# Patient Record
Sex: FEMALE | Race: WHITE | Employment: UNEMPLOYED | URBAN - METROPOLITAN AREA
[De-identification: names, ages, dates, MRNs, and addresses within clinical notes are randomized per-mention and may not be internally consistent; named-entity substitution may affect disease eponyms.]

---

## 2017-09-26 ENCOUNTER — HOSPITAL ENCOUNTER (OUTPATIENT)
Dept: MAMMOGRAPHY | Age: 67
Discharge: HOME | End: 2017-09-26
Attending: INTERNAL MEDICINE
Payer: COMMERCIAL

## 2017-09-26 DIAGNOSIS — Z12.31 VISIT FOR SCREENING MAMMOGRAM: ICD-10-CM

## 2017-09-26 PROCEDURE — 77067 SCR MAMMO BI INCL CAD: CPT

## 2018-04-30 ENCOUNTER — ED HISTORICAL/CONVERTED ENCOUNTER (OUTPATIENT)
Dept: OTHER | Age: 68
End: 2018-04-30

## 2018-07-11 ENCOUNTER — OFFICE VISIT (OUTPATIENT)
Dept: NEUROLOGY | Age: 68
End: 2018-07-11

## 2018-07-11 VITALS — DIASTOLIC BLOOD PRESSURE: 84 MMHG | HEART RATE: 71 BPM | OXYGEN SATURATION: 93 % | SYSTOLIC BLOOD PRESSURE: 148 MMHG

## 2018-07-11 DIAGNOSIS — R20.2 PARESTHESIA: Primary | ICD-10-CM

## 2018-07-11 DIAGNOSIS — G57.83 OTHER SPECIFIED MONONEUROPATHIES OF BILATERAL LOWER LIMBS: ICD-10-CM

## 2018-07-11 RX ORDER — GUAIFENESIN 100 MG/5ML
81 LIQUID (ML) ORAL DAILY
COMMUNITY
End: 2020-06-12

## 2018-07-11 RX ORDER — POLYETHYLENE GLYCOL 3350 17 G/17G
17 POWDER, FOR SOLUTION ORAL DAILY
COMMUNITY
End: 2020-06-12

## 2018-07-11 RX ORDER — PRAVASTATIN SODIUM 40 MG/1
40 TABLET ORAL DAILY
Refills: 0 | COMMUNITY
Start: 2018-07-02

## 2018-07-11 NOTE — LETTER
7/11/2018 11:56 AM 
 
Patient:  Chinyere Hansen YOB: 1950 Date of Visit: 7/11/2018 Dear Harini Linares MD 
[address] [address] VIA Facsimile: [phone] 
 : 
 
 
Thank you for referring Ms. Heri Hernandez to me for evaluation/treatment. Below are the relevant portions of my assessment and plan of care. If you have questions, please do not hesitate to call me. I look forward to following Ms. Gage along with you. Sincerely, Hoda Hernandez MD

## 2018-07-11 NOTE — MR AVS SNAPSHOT
[address] [place] [address] [id number] [place] [address] 
[phone] Patient: Landry Petit MRN: [id number] BYM:[birth year] Visit Information Date & Time Provider Department Dept. Phone Encounter #  
 7/11/2018 11:00 AM Todd Shields, [address] [facility] [phone] [id number] Follow-up Instructions Return for review of results. Upcoming Health Maintenance Date Due DTaP/Tdap/Td series (1 - Tdap) 11/28/1971 FOBT Q 1 YEAR AGE 50-75 11/28/2000 ZOSTER VACCINE AGE 60> 9/28/2010 GLAUCOMA SCREENING Q2Y 11/28/2015 Bone Densitometry (Dexa) Screening 11/28/2015 Pneumococcal 65+ Low/Medium Risk (1 of 2 - PCV13) 11/28/2015 Influenza Age 5 to Adult 8/1/2018 BREAST CANCER SCRN MAMMOGRAM 9/26/2019 Allergies as of 7/11/2018  Review Complete On: 7/11/2018 By: Todd Shields MD  
  
 Severity Noted Reaction Type Reactions Sulfa (Sulfonamide Antibiotics)  10/22/2010    Rash Current Immunizations  Never Reviewed No immunizations on file. Not reviewed this visit You Were Diagnosed With   
  
 Codes Comments Paresthesia    -  Primary ICD-10-CM: R20.2 ICD-9-CM: 782.0 Other specified mononeuropathies of bilateral lower limbs     ICD-10-CM: G57.83 ICD-9-CM: 355.8 Vitals BP Pulse SpO2 OB Status Smoking Status 148/84 71 93% Postmenopausal Former Smoker Preferred Pharmacy Pharmacy Name Phone [address] [place], [place] - [address] -[phone] Your Updated Medication List  
  
   
This list is accurate as of 7/11/18 11:49 AM.  Always use your most recent med list.  
  
  
  
  
 Levy Raines 250-50 mcg/dose diskus inhaler Generic drug:  fluticasone-salmeterol Take 1 Puff by inhalation every twelve (12) hours. ALPRAZolam 0.5 mg tablet Commonly known as:  Matilde Pittman Take 0.5 mg by mouth three (3) times daily as needed. TERE CHEWABLE ASPIRIN 81 mg chewable tablet Generic drug:  aspirin Take 81 mg by mouth daily. calcium-vitamin D 500 mg(1,250mg) -200 unit per tablet Commonly known as:  OYSTER SHELL Take 1 Tab by mouth two (2) times daily (with meals). citalopram 20 mg tablet Commonly known as:  Wanetta Never Take 20 mg by mouth daily. COMBIVENT IN Take 14.7 g by inhalation four (4) times daily. dilTIAZem  mg ER capsule Commonly known as:  CARDIZEM CD Take  by mouth daily. estradiol 0.01 % (0.1 mg/gram) vaginal cream  
Commonly known as:  ESTRACE  
1 g PV 2 times a week  
  
 levothyroxine 50 mcg tablet Commonly known as:  SYNTHROID Take  by mouth Daily (before breakfast). montelukast 10 mg tablet Commonly known as:  SINGULAIR Take 10 mg by mouth daily. nortriptyline 10 mg capsule Commonly known as:  PAMELOR Take  by mouth nightly. polyethylene glycol 17 gram packet Commonly known as:  Dena Kehr Take 17 g by mouth daily. pravastatin 20 mg tablet Commonly known as:  PRAVACHOL Take 20 mg by mouth daily. TYLENOL EXTRA STRENGTH 500 mg tablet Generic drug:  acetaminophen Take 500 mg by mouth every six (6) hours as needed. We Performed the Following JOSHUA, DIRECT, W/REFLEX M4630064 CPT(R)] SED RATE (ESR) I5788654 CPT(R)] SPEP AND SNEHAL, SERUM [OMW36135 Custom] VITAMIN B12 & FOLATE [72571 CPT(R)] VITAMIN B6 G2410397 CPT(R)] Follow-up Instructions Return for review of results. To-Do List   
 07/11/2018 Neurology:  EMG LIMITED   
  
 07/11/2018 Imaging:  MRI BRAIN WO CONT Introducing \[facility] [place] [facility]\"" & HEALTH SERVICES! Myles Wright introduces [employer] patient portal. Now you can access parts of your medical record, email your doctor's office, and request medication refills online. 1. In your internet browser, go to [email or web address] 2. Click on the First Time User? Click Here link in the Sign In box. You will see the New Member Sign Up page. 3. Enter your [employer] Access Code exactly as it appears below. You will not need to use this code after youve completed the sign-up process. If you do not sign up before the expiration date, you must request a new code. · [employer] Access Code: [email or web address] Expires: 10/9/2018  9:50 AM 
 
4. Enter the last four digits of your Social Security Number (xxxx) and Date of Birth (mm/dd/yyyy) as indicated and click Submit. You will be taken to the next sign-up page. 5. Create a MindSnacks ID. This will be your MindSnacks login ID and cannot be changed, so think of one that is secure and easy to remember. 6. Create a MindSnacks password. You can change your password at any time. 7. Enter your Password Reset Question and Answer. This can be used at a later time if you forget your password. 8. Enter your e-mail address. You will receive e-mail notification when new information is available in [address] 9. Click Sign Up. You can now view and download portions of your medical record. 10. Click the Download Summary menu link to download a portable copy of your medical information. If you have questions, please visit the Frequently Asked Questions section of the Mind[employer] website. Remember, Mind[employer]acks is NOT to be used for urgent needs. For medical emergencies, dial 911. Now available from your iPhone and Android! Please provide this summary of care documentation to your next provider. Your primary care clinician is listed as Clinton Bucio. If you have any questions after today's visit, please call [phone].

## 2018-07-11 NOTE — PROGRESS NOTES
NEUROLOGY NEW PATIENT OFFICE CONSULTATION      7/11/2018    RE: Jacklyn De La Vega         1950      REFERRED BY:  Latrell Bullock MD        CHIEF COMPLAINT:  This is Jacklyn De La Vega is a 79 y.o. female right handed on disability due to COPD who had concerns including Numbness. HPI:     1 month ago, patient noted L LE numbness tingling, started on tips of toes and went up to L buttock and all toes of R foot. Patient also has intermittent numbness of whole fingers of L arm and radiates to the L neck. Also has intermittent numbness of tips of all fingers of R hand. Had brief episode of numbness of L jaw.     (-) weakness  (+) L buttock pain  (-) lower back pain   (+) midline neck pain , non-radiating  (-) falls  (-) incontinene  (-) headache    Currently, symptoms remain the same    ROS   (-) rash  (-) fever  All other systems reviewed and are negative    Past Medical Hx  Past Medical History:   Diagnosis Date    Arrhythmia     svt    Calculus of kidney     COPD     emphysema    GERD (gastroesophageal reflux disease)     Psychiatric disorder     depression    Psychiatric disorder     anxiety    Thyroid disease    L leg fracture  - 1999    Social Hx  Social History     Social History    Marital status: LEGALLY      Spouse name: N/A    Number of children: N/A    Years of education: N/A     Social History Main Topics    Smoking status: Former Smoker    Smokeless tobacco: Former User     Quit date: 1/1/2003    Alcohol use No    Drug use: No    Sexual activity: Not on file     Other Topics Concern    Not on file     Social History Narrative       Family Hx  Family History   Problem Relation Age of Onset    Malignant Hyperthermia Neg Hx     Pseudocholinesterase Deficiency Neg Hx     Delayed Awakening Neg Hx     Post-op Nausea/Vomiting Neg Hx     Emergence Delirium Neg Hx     Post-op Cognitive Dysfunction Neg Hx     Other Neg Hx        ALLERGIES  Allergies   Allergen Reactions    Sulfa (Sulfonamide Antibiotics) Rash       CURRENT MEDS  Current Outpatient Prescriptions   Medication Sig Dispense Refill    pravastatin (PRAVACHOL) 20 mg tablet Take 20 mg by mouth daily. 0    aspirin (TERE CHEWABLE ASPIRIN) 81 mg chewable tablet Take 81 mg by mouth daily.  polyethylene glycol (MIRALAX) 17 gram packet Take 17 g by mouth daily.  acetaminophen (TYLENOL EXTRA STRENGTH) 500 mg tablet Take 500 mg by mouth every six (6) hours as needed.  levothyroxine (SYNTHROID) 50 mcg tablet Take  by mouth Daily (before breakfast).  diltiazem CD (CARDIZEM CD) 240 mg ER capsule Take  by mouth daily.  citalopram (CELEXA) 20 mg tablet Take 20 mg by mouth daily.  fluticasone-salmeterol (ADVAIR DISKUS) 250-50 mcg/Dose diskus inhaler Take 1 Puff by inhalation every twelve (12) hours.  IPRATROPIUM/ALBUTEROL SULFATE (COMBIVENT IN) Take 14.7 g by inhalation four (4) times daily.  alprazolam (XANAX) 0.5 mg tablet Take 0.5 mg by mouth three (3) times daily as needed.  nortriptyline (PAMELOR) 10 mg capsule Take  by mouth nightly.  montelukast (SINGULAIR) 10 mg tablet Take 10 mg by mouth daily.  estradiol (ESTRACE) 0.01 % (0.1 mg/gram) vaginal cream 1 g PV 2 times a week 42.5 g 0    calcium-vitamin D (OYSTER SHELL) 500 mg(1,250mg) -200 unit per tablet Take 1 Tab by mouth two (2) times daily (with meals). PREVIOUS WORKUP: (reviewed)  IMAGING:    CT Results (recent):  No results found for this or any previous visit. MRI Results (recent):  No results found for this or any previous visit. IR Results (recent):  No results found for this or any previous visit. VAS/US Results (recent):  No results found for this or any previous visit.         LABS (reviewed)  Results for orders placed or performed in visit on 11/13/14   CULTURE, URINE   Result Value Ref Range    Urine Culture, Routine No growth    HIV 1/2 AB, BY IMMUNOBLOT   Result Value Ref Range    HIV 1/O/2 Abs <1.00 <1.00    HIV 1/O/2 Abs, QL Non Reactive Non Reactive   RPR   Result Value Ref Range    RPR Non Reactive Non Reactive   HEP B SURFACE AG   Result Value Ref Range    Hep B surface Ag screen Negative Negative   HEPATITIS C AB   Result Value Ref Range    HEP C VIRUS AB <0.1 0.0 - 0.9 s/co ratio   CT, NG, TRICH VAG BY KOKO   Result Value Ref Range    C. trachomatis by KOKO Negative Negative    N. gonorrhoeae by KOKO Negative Negative    T. vaginalis by KOKO Negative Negative   PAP IG, HPV AND RFX HPV 50/40,40([id number])   Result Value Ref Range    Diagnosis Comment     Specimen adequacy Comment     CICD-9 Comment     Performed by: Comment     QC reviewed by: Comment     . Roverto Urrutia Note: Comment     Test methodology Comment     HPV DNA Probe, High Risk Negative Negative       Physical Exam:     Visit Vitals    /84    Pulse 71    SpO2 93%     General:  Alert, cooperative, no distress. Head:  Normocephalic, without obvious abnormality, atraumatic. Eyes:  Conjunctivae/corneas clear. Lungs:  Heart:   Non labored breathing  Regular rate and rhythm, no carotid bruits   Abdomen:   Soft, non-distended   Extremities: Extremities normal, atraumatic, no cyanosis or edema. Pulses: 2+ and symmetric all extremities. Skin: Skin color, texture, turgor normal. No rashes or lesions.   Neurologic Exam     Gen: Attention normal             Language: naming, repetition, fluency normal             Memory: intact recent and remote memory  Cranial Nerves:  I: smell Not tested   II: visual fields Full to confrontation   II: pupils Equal, round, reactive to light   II: optic disc No papilledema   III,VII: ptosis none   III,IV,VI: extraocular muscles  Full ROM   V: mastication normal   V: facial light touch sensation  normal   VII: facial muscle function   symmetric   VIII: hearing symmetric   IX: soft palate elevation  normal   XI: trapezius strength  5/5   XI: sternocleidomastoid strength 5/5   XI: neck flexion strength 5/5   XII: tongue  midline     Motor: normal bulk and tone, no tremor              Strength: 5/5 all four extremities  Sensory: intact to LT, PP, vibration, and JPS  Reflexes: 2+ UE 3+ knees, trace R ankle, absent L ankle; Down going toes  Coordination: Good FTN and HTS  Gait: normal gait including tandem; able to walk on toes and heels           Impression:     Bailee Vera is a 79 y.o. female who  has a past medical history of Arrhythmia; Calculus of kidney; COPD on O2; GERD (gastroesophageal reflux disease); Psychiatric disorder; Psychiatric disorder; and Thyroid disease. who   1 month ago, patient noted sudden onset of L LE numbness tingling, started on tips of toes and went up to L buttock and all toes of R foot. Patient also has intermittent numbness of whole fingers of L arm and radiates to the L neck. Also has intermittent numbness of tips of all fingers of R hand. Had brief episode of numbness of L jaw. (+) L buttock pain. Considerations include stroke/ demyelination, acute polyneuropathy/ radiculopathy and metabolic/nutritional/inflammatory causes. Non-organic component or paresthesias related to COPD are possibilities. RECOMMENDATIONS  1. MRI brain looking for structural cause of her symptoms (has numbness in the L face)  2. EMG/NCS looking for evidence of GBS and radiculopathy (absent L ankle reflex)  3. Blood test for Vit B12, Vit B6, Folate, SPEP/SNEHAL, JOSHUA, ESR  4. Optimize medical management of hypothyroid and anxiety c/o PCP    Follow-up Disposition:  Return for review of results.       Thank you for the consultation      Jania Franklin MD  Diplomate, American Board of Psychiatry and Neurology  Diplomate, Neuromuscular Medicine  Diplomate, American Board of Electrodiagnostic Medicine        CC: Cuca Sampson MD  Fax: [phone]

## 2018-07-13 LAB
FOLATE SERPL-MCNC: 12.3 NG/ML
VIT B12 SERPL-MCNC: 287 PG/ML (ref 232–1245)

## 2018-07-17 ENCOUNTER — TELEPHONE (OUTPATIENT)
Dept: NEUROLOGY | Age: 68
End: 2018-07-17

## 2018-07-17 NOTE — TELEPHONE ENCOUNTER
TC- notified patient she needs to take Vit B12 to help with her symptoms per Dr. Mor Barger. Patient verbalized understanding. Office visit notes and Dr. Mor Barger recommendation was faxed to Dr. Lavell Favre office.

## 2018-07-19 LAB
ALBUMIN SERPL ELPH-MCNC: 4 G/DL (ref 2.9–4.4)
ALBUMIN/GLOB SERPL: 1.2 {RATIO} (ref 0.7–1.7)
ALPHA1 GLOB SERPL ELPH-MCNC: 0.3 G/DL (ref 0–0.4)
ALPHA2 GLOB SERPL ELPH-MCNC: 0.9 G/DL (ref 0.4–1)
ANA SER QL: POSITIVE
B-GLOBULIN SERPL ELPH-MCNC: 1.2 G/DL (ref 0.7–1.3)
CENTROMERE B AB SER-ACNC: <0.2 AI (ref 0–0.9)
CHROMATIN AB SERPL-ACNC: <0.2 AI (ref 0–0.9)
DSDNA AB SER-ACNC: <1 IU/ML (ref 0–9)
ENA JO1 AB SER-ACNC: <0.2 AI (ref 0–0.9)
ENA RNP AB SER-ACNC: <0.2 AI (ref 0–0.9)
ENA SCL70 AB SER-ACNC: <0.2 AI (ref 0–0.9)
ENA SM AB SER-ACNC: <0.2 AI (ref 0–0.9)
ENA SS-A AB SER-ACNC: 7.1 AI (ref 0–0.9)
ENA SS-B AB SER-ACNC: >8 AI (ref 0–0.9)
ERYTHROCYTE [SEDIMENTATION RATE] IN BLOOD BY WESTERGREN METHOD: 4 MM/HR (ref 0–40)
GAMMA GLOB SERPL ELPH-MCNC: 1.1 G/DL (ref 0.4–1.8)
GLOBULIN SER-MCNC: 3.4 G/DL (ref 2.2–3.9)
IGA SERPL-MCNC: 176 MG/DL (ref 87–352)
IGG SERPL-MCNC: 1039 MG/DL (ref 700–1600)
IGM SERPL-MCNC: 116 MG/DL (ref 26–217)
INTERPRETATION SERPL IEP-IMP: NORMAL
M PROTEIN SERPL ELPH-MCNC: NORMAL G/DL
PLEASE NOTE:, 149534: NORMAL
PROT SERPL-MCNC: 7.4 G/DL (ref 6–8.5)
SEE BELOW, 164869: ABNORMAL
VIT B6 SERPL-MCNC: 5.6 UG/L (ref 2–32.8)

## 2018-07-23 ENCOUNTER — HOSPITAL ENCOUNTER (OUTPATIENT)
Dept: MRI IMAGING | Age: 68
Discharge: HOME | End: 2018-07-23
Attending: PSYCHIATRY & NEUROLOGY
Payer: MEDICARE

## 2018-07-23 ENCOUNTER — TELEPHONE (OUTPATIENT)
Dept: NEUROLOGY | Age: 68
End: 2018-07-23

## 2018-07-23 DIAGNOSIS — R20.2 PARESTHESIA: ICD-10-CM

## 2018-07-23 PROCEDURE — 70551 MRI BRAIN STEM W/O DYE: CPT

## 2018-07-26 ENCOUNTER — TELEPHONE (OUTPATIENT)
Dept: NEUROLOGY | Age: 68
End: 2018-07-26

## 2018-07-26 ENCOUNTER — OFFICE VISIT (OUTPATIENT)
Dept: NEUROLOGY | Age: 68
End: 2018-07-26

## 2018-07-26 DIAGNOSIS — R20.2 PARESTHESIA: Primary | ICD-10-CM

## 2018-07-26 NOTE — PROGRESS NOTES
EMG/NCS done. See Procedure Note for results. Since the last time, patient started taking Vit B12 for 1 week with minimal improvement    Discussed EMG/NCS showing absent sensory responses in the lower extremity with absent H-reflex concerning for an underlying generalized sensory polyneuropathy. Results for orders placed or performed in visit on 07/11/18   VITAMIN B12 & FOLATE   Result Value Ref Range    Vitamin B12 287 232 - 1245 pg/mL    Folate 12.3 >3.0 ng/mL   SED RATE (ESR)   Result Value Ref Range    Sed rate (ESR) 4 0 - 40 mm/hr   JOSHUA, DIRECT, W/REFLEX   Result Value Ref Range    Antinuclear Antibodies Direct Positive (A) Negative    Anti-DNA (DS) Ab, QT <1 0 - 9 IU/mL    RNP Abs <0.2 0.0 - 0.9 AI    Cox Abs <0.2 0.0 - 0.9 AI    Scleroderma-70 Ab <0.2 0.0 - 0.9 AI    Sjogren's Anti-SS-A 7.1 (H) 0.0 - 0.9 AI    Sjogren's Anti-SS-B >8.0 (H) 0.0 - 0.9 AI    Antichromatin Ab <0.2 0.0 - 0.9 AI    Anti-Chyna-1 <0.2 0.0 - 0.9 AI    Centromere B Ab <0.2 0.0 - 0.9 AI    See below Comment    VITAMIN B6   Result Value Ref Range    Vitamin B6 5.6 2.0 - 32.8 ug/L   SPEP AND SNEHAL, SERUM   Result Value Ref Range    Immunoglobulin G, Qt. 1039 700 - 1600 mg/dL    Immunoglobulin A, Qt. 176 87 - 352 mg/dL    Immunoglobulin M, Qt. 116 26 - 217 mg/dL    Protein, total 7.4 6.0 - 8.5 g/dL    Albumin 4.0 2.9 - 4.4 g/dL    Alpha-1-Globulin 0.3 0.0 - 0.4 g/dL    Alpha-2-Globulin 0.9 0.4 - 1.0 g/dL    Beta Globulin 1.2 0.7 - 1.3 g/dL    Gamma Globulin 1.1 0.4 - 1.8 g/dL    M-Tom Not Observed Not Observed g/dL    Globulin, total 3.4 2.2 - 3.9 g/dL    A/G ratio 1.2 0.7 - 1.7    Immunofixation Result Comment     Please note Comment      A> Generalized sensory polyneuropathy  (+) JOSHUA (+) SSA (+) SSB - possible sjogren syndrome    P> 1) Will refer to rheumatology for further evaluation  2) Discussed lab results.  MRI brain was unremarkable  3) Continue Vit B12 1000 mcg every day     Follow-up Disposition:  Return for after seen by rheumatologist.      Malou Ortega MD

## 2018-07-26 NOTE — PROCEDURES
EMG/ NCS Report  [facility]  - [facility]  [facility]  Chu Miramontes, [address] [place], [facility]   Ph: [phone]   FAX: [phone]/ [phone]  Test Date:  2018    Patient: Ellie Vargas : 1950 Physician: Terrie Patterson MD   Sex: Female Height: ' \" Ref Phys: Eliot Macdonald MD   ID#: [id number] Weight:  lbs. Technician: Aundrea Olivarez     Patient History / Exam:  Patient is coming for polyneuropathy evaluation. Fariha Schaffer is a 79 y.o. female who  has a past medical history of Arrhythmia; Calculus of kidney; COPD on O2; GERD (gastroesophageal reflux disease); Psychiatric disorder; and Thyroid disease. who 1 month ago, patient noted sudden onset of L LE numbness tingling, started on tips of toes and went up to L buttock and all toes of R foot. Patient also has intermittent numbness of whole fingers of L arm and radiates to the L neck. Also has intermittent numbness of tips of all fingers of R hand. Had brief episode of numbness of L jaw. (+) L buttock pain. (+) JOSHUA (+) SSA (+) SSB    Exam: Patient awake, alert, follows commands, clear speech; hearing grossly intact; EOMI, (-) facial asymmetry, tongue midline; Motor: 5/5 in all extremities; Sensory: intact to LT, PP, JPS; DTRs ++; Good FTN and HTS; Gait: stable  Sensory: intact to LT, PP, vibration, and JPS  Reflexes: 2+ UE 3+ knees, trace R ankle, absent L ankle; Down going toes  Coordination: Good FTN and HTS  Gait: normal gait including tandem; able to walk on toes and heels        EMG & NCV Findings:  Evaluation of the left Fibular motor nerve showed normal distal onset latency (4.9 ms), normal amplitude (2.4 mV), normal conduction velocity (B Fib-Ankle, 50 m/s), and normal conduction velocity (Poplt-B Fib, 67 m/s). The left median motor nerve showed normal distal onset latency (3.0 ms), normal amplitude (13.1 mV), and normal conduction velocity (Elbow-Wrist, 50 m/s).   The left tibial motor nerve showed normal distal onset latency (3.8 ms), normal amplitude (16.5 mV), and normal conduction velocity (Knee-Ankle, 45 m/s). The left ulnar motor nerve showed normal distal onset latency (2.7 ms), normal amplitude (11.4 mV), normal conduction velocity (B Elbow-Wrist, 59 m/s), and normal conduction velocity (A Elbow-B Elbow, 62 m/s). The left median sensory, the left radial sensory, and the left ulnar sensory nerves showed normal distal peak latency (L3.3, L2.3, L3.4 ms) and normal amplitude (L29.2, L46.6, L32.7 µV). The left Sup Fibular sensory nerve showed no response (Lower leg). The left sural sensory nerve showed no response (Calf). All F Wave latencies were within normal limits. H-reflex studies indicate that the left tibial H-reflex has no response. All examined muscles (as indicated in the following table) showed no evidence of electrical instability. Impression:  ABNORMAL    Extensive electrodiagnostic examination of the left upper and left lower extremities shows absent sensory responses in the lower extremity with absent H-reflex concerning for an underlying generalized sensory polyneuropathy. There is no definite evidence of a cervical motor radiculopathy or lumbosacral motor radiculopathy on the left.         Padmini Thompson MD  Diplomate, American Board of Psychiatry and Neurology  Diplomate, Neuromuscular Medicine  Diplomate, American Board of Electrodiagnostic Medicine  Director, [address] [place] [address] Accredited Laboratory with Exemplary Status        Nerve Conduction Studies  Anti Sensory Summary Table     Stim Site NR Peak (ms) Norm Peak (ms) P-T Amp (µV) Norm P-T Amp Site1 Site2 Dist (cm)   Left Median Anti Sensory (2nd Digit)  31°C   Wrist    3.3 <4 29.2 >13 Wrist 2nd Digit 14.0   Left Radial Anti Sensory (Base 1st Digit)  29.3°C   Wrist    2.3 <2.8 46.6 >11 Wrist Base 1st Digit 10.0   Left Sup Fibular Anti Sensory (Lat ankle)  28.9°C   Lower leg NR  <4.6  >4 Lower leg Lat ankle 10.0   Left Sural Anti Sensory (Lat Mall)  29°C   Calf NR  <4.5  >4.0 Calf Lat Mall 14.0   Left Ulnar Anti Sensory (5th Digit)  30.5°C   Wrist    3.4 <4.0 32.7 >9 Wrist 5th Digit 14.0     Motor Summary Table     Stim Site NR Onset (ms) Norm Onset (ms) O-P Amp (mV) Norm O-P Amp Amp (Prev) (%) Site1 Site2 Dist (cm) Kedar (m/s) Norm Kedar (m/s)   Left Fibular Motor (Ext Dig Brev)  32.6°C   Ankle    4.9 <6.5 2.4 >1.1 100.0 Ankle Ext Dig Brev 8.0     B Fib    10.5  2.7  112.5 B Fib Ankle 28.0 50 >38   Poplt    12.0  2.6  96.3 Poplt B Fib 10.0 67 >42   Left Median Motor (Abd Poll Brev)  30°C   Wrist    3.0 <4.5 13.1 >4.1 100.0 Wrist Abd Poll Brev 8.0     Elbow    6.6  11.8  90.1 Elbow Wrist 18.0 50 >49   Left Tibial Motor (Abd Alonzo Brev)  30.6°C   Ankle    3.8 <6.1 16.5 >1.1 100.0 Ankle Abd Alonzo Brev 8.0     Knee    10.9  11.5  69.7 Knee Ankle 32.0 45 >39   Left Ulnar Motor (Abd Dig Minimi)  30°C   Wrist    2.7 <3.1 11.4 >7.0 100.0 Wrist Abd Dig Minimi 8.0  >50   B Elbow    6.1  8.4  73.7 B Elbow Wrist 20.0 59 >50   A Elbow    7.7  7.4  88.1 A Elbow B Elbow 10.0 62 >50     F Wave Studies     NR F-Lat (ms) Lat Norm (ms) L-R F-Lat (ms) L-R Lat Norm   Left Tibial (Mrkrs) (Abd Hallucis)  30°C      50.61 <56  <5.7   Left Ulnar (Mrkrs) (Abd Dig Min)  29.8°C      26.01 <32  <2.5     H Reflex Studies     NR H-Lat (ms) L-R H-Lat (ms) L-R Lat Norm   Left Tibial (Gastroc)  29.6°C   NR   <2.0     EMG     Side Muscle Nerve Root Ins Act Fibs Psw Recrt Duration Amp Poly Comment   Left Ext Dig Brev Dp Br Peron L5, S1 Nml Nml Nml Nml Nml Nml Nml    Left AbdHallucis MedPlantar S1-2 Nml Nml Nml Nml Nml Nml Nml    Left AntTibialis Dp Br Peron L4-5 Nml Nml Nml Nml Nml Nml Nml    Left MedGastroc Tibial S1-2 Nml Nml Nml Nml Nml Nml Nml    Left VastusLat Femoral L2-4 Nml Nml Nml Nml Nml Nml Nml    Left 1stDorInt Ulnar C8-T1 Nml Nml Nml Nml Nml Nml Nml    Left ExtIndicis Radial (Post Int) C7-8 Nml Nml Nml Nml Nml Nml Nml    Left Abd Poll Brev Median C8-T1 Nml Nml Nml Nml Nml Nml Nml    Left Biceps Musculocut C5-6 Nml Nml Nml Nml Nml Nml Nml    Left Triceps Radial C6-7-8 Nml Nml Nml Nml Nml Nml Nml    Left Deltoid Axillary C5-6 Nml Nml Nml Nml Nml Nml Nml    Left Lower Cerv Parasp Rami C7,T1 Nml Nml Nml Nml Nml Nml Nml    Left GluteusMed SupGluteal L4-S1 Nml Nml Nml Nml Nml Nml Nml    Left Lower Lumb Parasp Rami L5,S1 Nml Nml Nml Nml Nml Nml Nml                Nerve Conduction Studies  Anti Sensory Left/Right Comparison     Stim Site L Lat (ms) R Lat (ms) L-R Lat (ms) L Amp (µV) R Amp (µV) L-R Amp (%) Site1 Site2 L Kedar (m/s) R Kedar (m/s) L-R Kedar (m/s)   Median Anti Sensory (2nd Digit)  31°C   Wrist 2.6   29.2   Wrist 2nd Digit 54     Radial Anti Sensory (Base 1st Digit)  29.3°C   Wrist 1.6   46.6   Wrist Base 1st Digit 63     Sup Fibular Anti Sensory (Lat ankle)  28.9°C   Lower leg       Lower leg Lat ankle      Sural Anti Sensory (Lat Mall)  29°C   Calf       Calf Lat Mall      Ulnar Anti Sensory (5th Digit)  30.5°C   Wrist 2.5   32.7   Wrist 5th Digit 56       Motor Left/Right Comparison     Stim Site L Lat (ms) R Lat (ms) L-R Lat (ms) L Amp (mV) R Amp (mV) L-R Amp (%) Site1 Site2 L Kedar (m/s) R Kedar (m/s) L-R Kedar (m/s)   Fibular Motor (Ext Dig Brev)  32.6°C   Ankle 4.9   2.4   Ankle Ext Dig Brev      B Fib 10.5   2.7   B Fib Ankle 50     Poplt 12.0   2.6   Poplt B Fib 67     Median Motor (Abd Poll Brev)  30°C   Wrist 3.0   13.1   Wrist Abd Poll Brev      Elbow 6.6   11.8   Elbow Wrist 50     Tibial Motor (Abd Alonzo Brev)  30.6°C   Ankle 3.8   16.5   Ankle Abd Alonzo Brev      Knee 10.9   11.5   Knee Ankle 45     Ulnar Motor (Abd Dig Minimi)  30°C   Wrist 2.7   11.4   Wrist Abd Dig Minimi      B Elbow 6.1   8.4   B Elbow Wrist 59     A Elbow 7.7   7.4   A Elbow B Elbow 62           Waveforms:

## 2018-07-26 NOTE — TELEPHONE ENCOUNTER
TC- informed patient that JOSHUA is positive and she needs to follow up with PCP regarding this. Lab results faxed to Dr. Mell Watson.

## 2018-07-26 NOTE — LETTER
7/26/2018 1:56 PM 
 
Patient:  Eliezer Coleman YOB: 1950 Date of Visit: 7/26/2018 Dear Heather Godinez MD 
[address] [facility] [address] VIA Facsimile: [phone] 
 : 
 
 
Thank you for referring Ms. Delaney Petersen to me for evaluation/treatment. Below are the relevant portions of my assessment and plan of care. If you have questions, please do not hesitate to call me. I look forward to following Ms. Gage along with you. Sincerely, EMG SCHEDULE

## 2018-11-19 ENCOUNTER — OP HISTORICAL/CONVERTED ENCOUNTER (OUTPATIENT)
Dept: OTHER | Age: 68
End: 2018-11-19

## 2018-12-19 ENCOUNTER — ED HISTORICAL/CONVERTED ENCOUNTER (OUTPATIENT)
Dept: OTHER | Age: 68
End: 2018-12-19

## 2018-12-29 ENCOUNTER — IP HISTORICAL/CONVERTED ENCOUNTER (OUTPATIENT)
Dept: OTHER | Age: 68
End: 2018-12-29

## 2019-02-04 ENCOUNTER — ED HISTORICAL/CONVERTED ENCOUNTER (OUTPATIENT)
Dept: OTHER | Age: 69
End: 2019-02-04

## 2019-11-06 ENCOUNTER — OP HISTORICAL/CONVERTED ENCOUNTER (OUTPATIENT)
Dept: OTHER | Age: 69
End: 2019-11-06

## 2019-12-17 ENCOUNTER — OP HISTORICAL/CONVERTED ENCOUNTER (OUTPATIENT)
Dept: OTHER | Age: 69
End: 2019-12-17

## 2020-06-12 ENCOUNTER — OFFICE VISIT (OUTPATIENT)
Dept: ENDOCRINOLOGY | Age: 70
End: 2020-06-12

## 2020-06-12 VITALS
SYSTOLIC BLOOD PRESSURE: 135 MMHG | OXYGEN SATURATION: 95 % | TEMPERATURE: 98.6 F | DIASTOLIC BLOOD PRESSURE: 75 MMHG | WEIGHT: 127 LBS | HEIGHT: 63 IN | BODY MASS INDEX: 22.5 KG/M2 | RESPIRATION RATE: 18 BRPM | HEART RATE: 61 BPM

## 2020-06-12 DIAGNOSIS — E55.9 VITAMIN D DEFICIENCY: ICD-10-CM

## 2020-06-12 DIAGNOSIS — J44.9 CHRONIC OBSTRUCTIVE PULMONARY DISEASE, UNSPECIFIED COPD TYPE (HCC): ICD-10-CM

## 2020-06-12 DIAGNOSIS — M81.0 AGE-RELATED OSTEOPOROSIS WITHOUT CURRENT PATHOLOGICAL FRACTURE: ICD-10-CM

## 2020-06-12 DIAGNOSIS — M81.0 AGE-RELATED OSTEOPOROSIS WITHOUT CURRENT PATHOLOGICAL FRACTURE: Primary | ICD-10-CM

## 2020-06-12 DIAGNOSIS — K21.9 GERD WITHOUT ESOPHAGITIS: ICD-10-CM

## 2020-06-12 RX ORDER — HYDROXYZINE 25 MG/1
25 TABLET, FILM COATED ORAL
COMMUNITY

## 2020-06-12 RX ORDER — GABAPENTIN 600 MG/1
600 TABLET ORAL 3 TIMES DAILY
COMMUNITY

## 2020-06-12 RX ORDER — PANTOPRAZOLE SODIUM 40 MG/1
40 TABLET, DELAYED RELEASE ORAL DAILY
COMMUNITY

## 2020-06-12 NOTE — PATIENT INSTRUCTIONS
Andrew Degroot Daily requirement of calcium is 1000 mg - 1200 mg and Vitamin D is 800 - 1000 Units daily (should  preferably be from food sources). Calcium rich food choices are  low fat dairy products, fortified orange juice,broccoli,salmon. Fall precautions  Weight bearing exercises helps. Excess alcohol and smoking weakens the bone and hence should be avoided. Benefits of osteoporosis medications  Medicines for osteoporosis help prevent bone loss and increase bone density, which decrease the fracturesi. The table below outlines the common osteoporosis drugs, and how much they reduce the risk of fractures. Drug name How you take the medication Relative risk reduction for vertebral fracture  Relative risk reduction for hip fractures         Raloxifene (Evista)i Daily by mouth 36% No proven benefit    Alendronate  (Fosamax) Daily of weekly by mouth 45% 40%           Zolendronate (Reclast) Yearly IV infusion 70% 41%     Denosumabviiiviii  (Prolia) Twice yearly injection under the skin 68% 40%   Teriparatideixix (Forteo) Daily injection under the skin for 2 years  69% (this is in people with a prior vertebral fracture) No proven benefit   Abaloparatidexx (Tymlos) Daily injection under the skin for 1 and 1/2 years 86% No proven benefit   Romosozumab  ( Evenity) Monthly injection for 1 year  73%       Safety and side effects of osteoporosis medications    Common side effects of osteoporosis drugs   Acid reflux: this is only seen in oral osteoporosis drugs.  Flu-like symptoms: This is uncommon in oral osteoporosis drugs, but about 1 in 20 patients receiving the injectable osteoporosis drugs (reclast and prolia) can have flu-like symptoms for 1-3 days after the injection.  Low calcium (hypocalcemia): This is uncommon in oral osteoporosis drugs, but about 1-2 out of 100 patients receiving injectable osteoporosis drugs (reclast and prolia) can cause low calcium levels in the blood.      Rare side effects of osteoporosis drugs  Osteonecrosis of the jaw (ONJ):    0.01% over 10 years. In other words, one out of every 10,000 people to take this medication for 10 years could get ONJ.  ONJ is a rare condition where the bone of the lower or upper jaw becomes exposed (usually because of tooth extractions) and does not heal properly. This is a very rare side effect, and the risk is thought to be primarily for people on high doses of medication in the setting of cancer. Atypical femoral fractures (AFF):    0.5% over 10 years.  In other words 5 out of every 1000 people to take this medication for 10 years could have an atypical femoral fracture.  An atypical femur fracture is called atypical because of the location and condition of the fracture. AFFs start as a weakening of the outer rim of the femur below the hip area. The tiny crack that occurs is a kind of stress fracture, but unlike stress fractures in people who overdo exercise training, this fracture occurs with regular life activities. An AFF is also different from more common osteoporosis fractures that happen after a single injury - like a fall; AFFs develop slowly from repeated, normal activities. In about 2 of the 3 people who get an AFF, there are warning signals that occur over many weeks to months before the bone breaks. If nothing is done about the early warning signs, the crack continues to grow and eventually the thigh bone breaks in two. The warning sign of AFF is an aching pain in the groin or thigh. The risk of side effects from osteoporosis drugs is MUCH smaller than the potential benefit         If untreated, 2,000 out of every 10,000 people with a 20% risk would have an osteoporotic fracture.  If these same 10,000 people were treated with an osteoporosis medication:  o Only 10% of treated people (1,000 out of every 10,000) would have an osteoporotic fracture. In other words, 1000 fewer people would have an osteoporotic fracture. o 0.50% of the treated people (50 out of every 10,000) would have an atypical femoral fracture.  o 0.01% of the treated people (1 out of every 10,000) could have osteonecrosis of the jaw. While this is just an example, it clearly shows how unlikely it is someone will have a rare side effect, and how likely it is that someone would benefit from an osteoporosis medication.        RECLAST or Zolendronic acid is yearly injection

## 2020-06-12 NOTE — PROGRESS NOTES
Chery Pitts MD              Patient Information  Date:6/13/2020  Name : Cayetano Lambert 71 y.o.     YOB: 1950         Referred by: Juan Schmidt DO       Chief Complaint   Patient presents with    New Patient     referred for Osteoporosis       History of Present Illness: Cayetano Lambert is a 71 y.o. female here for evaluation and  management of osteoporosis. she was found to have osteoporosis based on the BMD done in 2020. No prior history. History of COPD on home oxygen, chronic GERD, on PPI, history of peptic ulcer disease. Former smoker, quit several years ago. Had a fracture of the left leg due to trauma. No premature menopause, no loss of height. No radiation exposure  History of kidney stones,  Prior therapy : None    Dietary calcium Intake:  Minimal amount of dairy in her diet. On calcium and vitamin D supplements. No history of fragility fractures,chronic steroid use, hyperthyroidism. No known history of hypercalcemia, family history of kidney stones. Past Medical History:   Diagnosis Date    Anxiety     Arrhythmia     svt    Calculus of kidney     Chronic atrial fibrillation (HCC)     COPD     emphysema    Depression     GERD (gastroesophageal reflux disease)     Hypothyroidism     Sciatica     Thyroid disease      Past Surgical History:   Procedure Laterality Date    BREAST SURGERY PROCEDURE UNLISTED  2007    biopsy     HX BREAST BIOPSY Right 2010    HX GYN  11/05    LEEP  for CIN2    HX ORTHOPAEDIC      left leg     Current Outpatient Medications   Medication Sig    gabapentin (NEURONTIN) 600 mg tablet Take 600 mg by mouth three (3) times daily.  hydrOXYzine HCL (ATARAX) 25 mg tablet Take 25 mg by mouth three (3) times daily as needed.  pantoprazole (PROTONIX) 40 mg tablet Take 40 mg by mouth daily.  pravastatin (PRAVACHOL) 40 mg tablet Take 40 mg by mouth daily.     nortriptyline (PAMELOR) 10 mg capsule Take 10 mg by mouth nightly.  montelukast (SINGULAIR) 10 mg tablet Take 10 mg by mouth daily.  acetaminophen (TYLENOL EXTRA STRENGTH) 500 mg tablet Take 500 mg by mouth every six (6) hours as needed.  levothyroxine (SYNTHROID) 50 mcg tablet Take 50 mcg by mouth Daily (before breakfast).  dilTIAZem ER (CARDIZEM CD) 120 mg capsule Take 120 mg by mouth daily.  citalopram (CELEXA) 20 mg tablet Take 20 mg by mouth daily.  fluticasone-salmeterol (ADVAIR DISKUS) 250-50 mcg/Dose diskus inhaler Take 1 Puff by inhalation every twelve (12) hours.  IPRATROPIUM/ALBUTEROL SULFATE (COMBIVENT IN) Take 14.7 g by inhalation four (4) times daily.  calcium-vitamin D (OYSTER SHELL) 500 mg(1,250mg) -200 unit per tablet Take 1 Tab by mouth two (2) times daily (with meals). No current facility-administered medications for this visit. Allergies   Allergen Reactions    Sulfa (Sulfonamide Antibiotics) Rash         Review of Systems:  All 10 systems  reviewed and are negative other than mentioned in HPI    Physical Examination:  Blood pressure 135/75, pulse 61, temperature 98.6 °F (37 °C), temperature source Oral, resp. rate 18, height 5' 3\" (1.6 m), weight 127 lb (57.6 kg), SpO2 95 %. Body mass index is 22.5 kg/m².   - General: pleasant, no distress, good eye contact  - HEENT: no exopthalmos, no periorbital edema, no scleral/conjunctival injection, EOMI, no lid lag or stare  - Neck: supple, no thyromegaly, no supraclavicular or dorsocervical fat pads  - Cardiovascular: regular, normal rate, normal S1 and S2,   - Respiratory: clear to auscultation bilaterally  - Gastrointestinal: soft, nontender, nondistended, BS +  - Musculoskeletal: no proximal muscle weakness in upper or lower extremities  - Integumentary:  no rashes, no edema  - Neurological: Alert and oriented x3  - Psychiatric: normal mood and affect          Assessment/Plan:     1. Age-related osteoporosis without current pathological fracture    2. Vitamin D deficiency    3. GERD without esophagitis    4. Chronic obstructive pulmonary disease, unspecified COPD type ([facility] [address]        Osteoporosis  Has several risk factors: COPD, former smoker, chronic PPI use,  Will do basic labs to screen for other secondary causes . Not a candidate for oral bisphosphonates due to GERD, PUD  Discussed different options, benefits and side effects -    She opted Reclast:  No history of radiation exposure  Daily requirement of calcium is 1000 mg - 1200 mg and Vitamin D is 1000 - 2000 Units daily (should  preferably be from food sources). Calcium rich food choices are  low fat dairy products, fortified orange juice,broccoli,salmon. Fall precautions. Weight bearing exercises helps. Excess alcohol and smoking weakens the bone and hence should be avoided. Thank you for allowing me to participate in the care of this patient. Jodee Luis MD    Patient Jayda Wong verbalized understanding      Patient Instructions     . Daily requirement of calcium is 1000 mg - 1200 mg and Vitamin D is 800 - 1000 Units daily (should  preferably be from food sources). Calcium rich food choices are  low fat dairy products, fortified orange juice,broccoli,salmon. Fall precautions  Weight bearing exercises helps. Excess alcohol and smoking weakens the bone and hence should be avoided. Benefits of osteoporosis medications  Medicines for osteoporosis help prevent bone loss and increase bone density, which decrease the fracturesi. The table below outlines the common osteoporosis drugs, and how much they reduce the risk of fractures.      Drug name How you take the medication Relative risk reduction for vertebral fracture  Relative risk reduction for hip fractures         Raloxifene (Evista)i Daily by mouth 36% No proven benefit    Alendronate  (Fosamax) Daily of weekly by mouth 45% 40%           Zolendronate (Reclast) Yearly IV infusion 70% 41% Denosumabviiiviii  (Prolia) Twice yearly injection under the skin 68% 40%   Teriparatideixix (Forteo) Daily injection under the skin for 2 years  69% (this is in people with a prior vertebral fracture) No proven benefit   Abaloparatidexx (Tymlos) Daily injection under the skin for 1 and 1/2 years 86% No proven benefit   Romosozumab  ( Evenity) Monthly injection for 1 year  73%       Safety and side effects of osteoporosis medications    Common side effects of osteoporosis drugs   Acid reflux: this is only seen in oral osteoporosis drugs.  Flu-like symptoms: This is uncommon in oral osteoporosis drugs, but about 1 in 20 patients receiving the injectable osteoporosis drugs (reclast and prolia) can have flu-like symptoms for 1-3 days after the injection.  Low calcium (hypocalcemia): This is uncommon in oral osteoporosis drugs, but about 1-2 out of 100 patients receiving injectable osteoporosis drugs (reclast and prolia) can cause low calcium levels in the blood. Rare side effects of osteoporosis drugs  Osteonecrosis of the jaw (ONJ):    0.01% over 10 years. In other words, one out of every 10,000 people to take this medication for 10 years could get ONJ.  ONJ is a rare condition where the bone of the lower or upper jaw becomes exposed (usually because of tooth extractions) and does not heal properly. This is a very rare side effect, and the risk is thought to be primarily for people on high doses of medication in the setting of cancer. Atypical femoral fractures (AFF):    0.5% over 10 years.  In other words 5 out of every 1000 people to take this medication for 10 years could have an atypical femoral fracture.  An atypical femur fracture is called atypical because of the location and condition of the fracture. AFFs start as a weakening of the outer rim of the femur below the hip area.  The tiny crack that occurs is a kind of stress fracture, but unlike stress fractures in people who overdo exercise training, this fracture occurs with regular life activities. An AFF is also different from more common osteoporosis fractures that happen after a single injury - like a fall; AFFs develop slowly from repeated, normal activities. In about 2 of the 3 people who get an AFF, there are warning signals that occur over many weeks to months before the bone breaks. If nothing is done about the early warning signs, the crack continues to grow and eventually the thigh bone breaks in two. The warning sign of AFF is an aching pain in the groin or thigh. The risk of side effects from osteoporosis drugs is MUCH smaller than the potential benefit         If untreated, 2,000 out of every 10,000 people with a 20% risk would have an osteoporotic fracture.  If these same 10,000 people were treated with an osteoporosis medication:  o Only 10% of treated people (1,000 out of every 10,000) would have an osteoporotic fracture. In other words, 1000 fewer people would have an osteoporotic fracture.   o 0.50% of the treated people (50 out of every 10,000) would have an atypical femoral fracture.  o 0.01% of the treated people (1 out of every 10,000) could have osteonecrosis of the jaw. While this is just an example, it clearly shows how unlikely it is someone will have a rare side effect, and how likely it is that someone would benefit from an osteoporosis medication. RECLAST or Zolendronic acid is yearly injection           Follow-up and Dispositions    · Return in about 4 months (around 10/12/2020). Voice-recognition software was used to generate this report, which may result in some phonetic-based errors in the grammar and contents. Even though attempts were made to correct all the mistakes, some may have been missed and remained in the body of the report.

## 2020-06-12 NOTE — PROGRESS NOTES
Jono Pearson is a 71 y.o. female here for   Chief Complaint   Patient presents with    New Patient     referred for Osteoporosis     1. Have you been to the ER, urgent care clinic since your last visit? Hospitalized since your last visit? -n/a    2. Have you seen or consulted any other health care providers outside of the [address] [place] [address] since your last visit?   Include any pap smears or colon screening.-n/a

## 2020-06-12 NOTE — LETTER
6/13/20 Patient: Aurelio Arrow YOB: 1950 Date of Visit: 6/12/2020 Isabel Joseph DO 
[address] [facility] [address] VIA Facsimile: [phone] Dear Isabel Joseph DO, Thank you for referring Ms. Dong Castellanos to [address] [address] for evaluation. My notes for this consultation are attached. If you have questions, please do not hesitate to call me. I look forward to following your patient along with you. Sincerely, Najma Reina MD

## 2020-06-13 PROBLEM — K21.9 GERD WITHOUT ESOPHAGITIS: Status: ACTIVE | Noted: 2020-06-13

## 2020-06-13 PROBLEM — E55.9 VITAMIN D DEFICIENCY: Status: ACTIVE | Noted: 2020-06-13

## 2020-06-13 PROBLEM — J44.9 CHRONIC OBSTRUCTIVE PULMONARY DISEASE (HCC): Status: ACTIVE | Noted: 2020-06-13

## 2020-07-14 LAB
25(OH)D3+25(OH)D2 SERPL-MCNC: 31 NG/ML (ref 30–100)
BUN SERPL-MCNC: 7 MG/DL (ref 8–27)
BUN/CREAT SERPL: 10 (ref 12–28)
CALCIUM SERPL-MCNC: 9.4 MG/DL (ref 8.7–10.3)
CHLORIDE SERPL-SCNC: 102 MMOL/L (ref 96–106)
CO2 SERPL-SCNC: 27 MMOL/L (ref 20–29)
CREAT SERPL-MCNC: 0.69 MG/DL (ref 0.57–1)
GLUCOSE SERPL-MCNC: 72 MG/DL (ref 65–99)
POTASSIUM SERPL-SCNC: 5.3 MMOL/L (ref 3.5–5.2)
PTH-INTACT SERPL-MCNC: 28 PG/ML (ref 15–65)
SODIUM SERPL-SCNC: 142 MMOL/L (ref 134–144)

## 2020-07-16 NOTE — PROGRESS NOTES
Kidney functions are good, vitamin D is good  We will arrange for Reclast which is once a year injection for osteoporosis at the hospital

## 2020-07-17 ENCOUNTER — TELEPHONE (OUTPATIENT)
Dept: ENDOCRINOLOGY | Age: 70
End: 2020-07-17

## 2020-07-17 NOTE — TELEPHONE ENCOUNTER
Informed pt of results below.  Pt verbalized understanding.    ----- Message from Katarina Mtz MD sent at 7/16/2020  2:55 PM EDT -----  Kidney functions are good, vitamin D is good  We will arrange for Reclast which is once a year injection for osteoporosis at the hospitals

## 2020-07-21 ENCOUNTER — OP HISTORICAL/CONVERTED ENCOUNTER (OUTPATIENT)
Dept: OTHER | Age: 70
End: 2020-07-21

## 2020-07-31 ENCOUNTER — HOSPITAL ENCOUNTER (OUTPATIENT)
Dept: INFUSION THERAPY | Age: 70
Discharge: HOME | End: 2020-07-31

## 2020-08-03 RX ORDER — SODIUM CHLORIDE 9 MG/ML
25 INJECTION, SOLUTION INTRAVENOUS CONTINUOUS
Status: DISPENSED | OUTPATIENT
Start: 2020-08-10 | End: 2020-08-10

## 2020-08-03 RX ORDER — ZOLEDRONIC ACID 5 MG/100ML
5 INJECTION, SOLUTION INTRAVENOUS ONCE
Status: COMPLETED | OUTPATIENT
Start: 2020-08-10 | End: 2020-08-10

## 2020-08-10 ENCOUNTER — HOSPITAL ENCOUNTER (OUTPATIENT)
Dept: INFUSION THERAPY | Age: 70
Discharge: HOME | End: 2020-08-10
Payer: MEDICARE

## 2020-08-10 VITALS
WEIGHT: 123.1 LBS | HEIGHT: 62 IN | OXYGEN SATURATION: 93 % | RESPIRATION RATE: 18 BRPM | TEMPERATURE: 97.1 F | BODY MASS INDEX: 22.65 KG/M2 | DIASTOLIC BLOOD PRESSURE: 81 MMHG | HEART RATE: 78 BPM | SYSTOLIC BLOOD PRESSURE: 129 MMHG

## 2020-08-10 PROCEDURE — 96374 THER/PROPH/DIAG INJ IV PUSH: CPT

## 2020-08-10 PROCEDURE — 74011250636 HC RX REV CODE- 250/636: Performed by: INTERNAL MEDICINE

## 2020-08-10 PROCEDURE — 74011000258 HC RX REV CODE- 258: Performed by: INTERNAL MEDICINE

## 2020-08-10 RX ADMIN — ZOLEDRONIC ACID 5 MG: 5 INJECTION, SOLUTION INTRAVENOUS at 14:14

## 2020-08-10 RX ADMIN — SODIUM CHLORIDE 25 ML/HR: 900 INJECTION, SOLUTION INTRAVENOUS at 14:12

## 2020-08-10 NOTE — PROGRESS NOTES
[facility]   Visit Progress Note    2552 Patient admitted to [facility] for Reclast in stable condition. Assessment completed. No new concerns voiced. Pt denies having recent dental procedures in the past 4-6 weeks and states no plans for any in the near future. Pt states she has had dentured since she was 25. Pt verbalized understanding importance of notifying dentist of taking Reclast.    VS  Patient Vitals for the past 12 hrs:   Temp Pulse Resp BP SpO2   08/10/20 1434 -- 78 -- 129/81 --   08/10/20 1351 97.1 °F (36.2 °C) 69 18 130/71 93 %         PIV with positive blood return. Medications:  Medications Administered     0.9% sodium chloride infusion     Admin Date  08/10/2020 Action  New Bag Dose  25 mL/hr Rate  25 mL/hr Route  IntraVENous Administered By  Carlo Kirby RN          zoledronic acid (RECLAST) IVPB 5 mg     Admin Date  08/10/2020 Action  Given Dose  5 mg Rate  400 mL/hr Route  IntraVENous Administered By  Carlo Kirby, ULISSES                  0311 Patient tolerated treatment well. Patient discharged from [place] [id number] in no distress. Labs from 7/13 reviewed.

## 2021-06-15 ENCOUNTER — TRANSCRIBE ORDER (OUTPATIENT)
Dept: SCHEDULING | Age: 71
End: 2021-06-15

## 2021-06-15 DIAGNOSIS — Z12.31 VISIT FOR SCREENING MAMMOGRAM: Primary | ICD-10-CM

## 2022-01-01 ENCOUNTER — APPOINTMENT (OUTPATIENT)
Dept: GENERAL RADIOLOGY | Age: 72
DRG: 308 | End: 2022-01-01
Attending: INTERNAL MEDICINE
Payer: MEDICARE

## 2022-01-01 ENCOUNTER — APPOINTMENT (OUTPATIENT)
Dept: CT IMAGING | Age: 72
DRG: 308 | End: 2022-01-01
Attending: HOSPITALIST
Payer: MEDICARE

## 2022-01-01 ENCOUNTER — APPOINTMENT (OUTPATIENT)
Dept: NON INVASIVE DIAGNOSTICS | Age: 72
DRG: 308 | End: 2022-01-01
Attending: EMERGENCY MEDICINE
Payer: MEDICARE

## 2022-01-01 ENCOUNTER — ANESTHESIA (OUTPATIENT)
Dept: ICU | Age: 72
DRG: 308 | End: 2022-01-01
Payer: MEDICARE

## 2022-01-01 ENCOUNTER — APPOINTMENT (OUTPATIENT)
Dept: INTERVENTIONAL RADIOLOGY/VASCULAR | Age: 72
DRG: 308 | End: 2022-01-01
Attending: INTERNAL MEDICINE
Payer: MEDICARE

## 2022-01-01 ENCOUNTER — HOSPITAL ENCOUNTER (INPATIENT)
Age: 72
LOS: 2 days | DRG: 308 | End: 2022-12-23
Attending: EMERGENCY MEDICINE | Admitting: INTERNAL MEDICINE
Payer: MEDICARE

## 2022-01-01 ENCOUNTER — APPOINTMENT (OUTPATIENT)
Dept: GENERAL RADIOLOGY | Age: 72
DRG: 308 | End: 2022-01-01
Attending: HOSPITALIST
Payer: MEDICARE

## 2022-01-01 ENCOUNTER — APPOINTMENT (OUTPATIENT)
Dept: ULTRASOUND IMAGING | Age: 72
DRG: 308 | End: 2022-01-01
Attending: INTERNAL MEDICINE
Payer: MEDICARE

## 2022-01-01 ENCOUNTER — APPOINTMENT (OUTPATIENT)
Dept: GENERAL RADIOLOGY | Age: 72
DRG: 308 | End: 2022-01-01
Attending: STUDENT IN AN ORGANIZED HEALTH CARE EDUCATION/TRAINING PROGRAM
Payer: MEDICARE

## 2022-01-01 ENCOUNTER — ANESTHESIA EVENT (OUTPATIENT)
Dept: ICU | Age: 72
DRG: 308 | End: 2022-01-01
Payer: MEDICARE

## 2022-01-01 ENCOUNTER — APPOINTMENT (OUTPATIENT)
Dept: GENERAL RADIOLOGY | Age: 72
DRG: 308 | End: 2022-01-01
Attending: EMERGENCY MEDICINE
Payer: MEDICARE

## 2022-01-01 VITALS
OXYGEN SATURATION: 83 % | BODY MASS INDEX: 26.17 KG/M2 | HEART RATE: 70 BPM | HEIGHT: 62 IN | RESPIRATION RATE: 16 BRPM | SYSTOLIC BLOOD PRESSURE: 93 MMHG | WEIGHT: 142.2 LBS | TEMPERATURE: 98.5 F | DIASTOLIC BLOOD PRESSURE: 80 MMHG

## 2022-01-01 DIAGNOSIS — N17.9 AKI (ACUTE KIDNEY INJURY) (HCC): ICD-10-CM

## 2022-01-01 DIAGNOSIS — A41.9 SEPTIC SHOCK (HCC): ICD-10-CM

## 2022-01-01 DIAGNOSIS — J96.01 ACUTE RESPIRATORY FAILURE WITH HYPOXIA (HCC): ICD-10-CM

## 2022-01-01 DIAGNOSIS — J44.9 CHRONIC OBSTRUCTIVE PULMONARY DISEASE, UNSPECIFIED COPD TYPE (HCC): ICD-10-CM

## 2022-01-01 DIAGNOSIS — R65.21 SEPTIC SHOCK (HCC): ICD-10-CM

## 2022-01-01 DIAGNOSIS — I48.91 ATRIAL FIBRILLATION WITH RAPID VENTRICULAR RESPONSE (HCC): Primary | ICD-10-CM

## 2022-01-01 DIAGNOSIS — K81.0 ACUTE CHOLECYSTITIS: ICD-10-CM

## 2022-01-01 LAB
ABO + RH BLD: NORMAL
ALBUMIN SERPL-MCNC: 2.2 G/DL (ref 3.5–5)
ALBUMIN SERPL-MCNC: 2.5 G/DL (ref 3.5–5)
ALBUMIN SERPL-MCNC: 2.5 G/DL (ref 3.5–5)
ALBUMIN SERPL-MCNC: 2.7 G/DL (ref 3.5–5)
ALBUMIN SERPL-MCNC: 3.1 G/DL (ref 3.5–5)
ALBUMIN SERPL-MCNC: 3.5 G/DL (ref 3.5–5)
ALBUMIN/GLOB SERPL: 1 {RATIO} (ref 1.1–2.2)
ALBUMIN/GLOB SERPL: 1.1 {RATIO} (ref 1.1–2.2)
ALP SERPL-CCNC: 79 U/L (ref 45–117)
ALP SERPL-CCNC: 97 U/L (ref 45–117)
ALT SERPL-CCNC: 30 U/L (ref 12–78)
ALT SERPL-CCNC: >3500 U/L (ref 12–78)
ANION GAP SERPL CALC-SCNC: 12 MMOL/L (ref 5–15)
ANION GAP SERPL CALC-SCNC: 13 MMOL/L (ref 5–15)
ANION GAP SERPL CALC-SCNC: 15 MMOL/L (ref 5–15)
ANION GAP SERPL CALC-SCNC: 16 MMOL/L (ref 5–15)
ANION GAP SERPL CALC-SCNC: 16 MMOL/L (ref 5–15)
ANION GAP SERPL CALC-SCNC: 17 MMOL/L (ref 5–15)
ANION GAP SERPL CALC-SCNC: 25 MMOL/L (ref 5–15)
ANION GAP SERPL CALC-SCNC: 26 MMOL/L (ref 5–15)
ANION GAP SERPL CALC-SCNC: 5 MMOL/L (ref 5–15)
ANION GAP SERPL CALC-SCNC: 8 MMOL/L (ref 5–15)
APPEARANCE UR: CLEAR
ARTERIAL PATENCY WRIST A: ABNORMAL
ARTERIAL PATENCY WRIST A: YES
AST SERPL W P-5'-P-CCNC: >2000 U/L (ref 15–37)
AST SERPL W P-5'-P-CCNC: ABNORMAL U/L (ref 15–37)
ATRIAL RATE: 113 BPM
ATRIAL RATE: 78 BPM
BACTERIA URNS QL MICRO: NEGATIVE /HPF
BASE DEFICIT BLDA-SCNC: 13 MMOL/L
BASE DEFICIT BLDA-SCNC: 5.2 MMOL/L
BASE DEFICIT BLDA-SCNC: 5.4 MMOL/L
BASE DEFICIT BLDA-SCNC: 5.4 MMOL/L
BASE DEFICIT BLDA-SCNC: 7.8 MMOL/L
BASE DEFICIT BLDA-SCNC: 8.2 MMOL/L
BASOPHILS # BLD: 0 K/UL (ref 0–0.1)
BASOPHILS NFR BLD: 0 % (ref 0–1)
BASOPHILS NFR BLD: 1 % (ref 0–1)
BDY SITE: ABNORMAL
BILIRUB SERPL-MCNC: 0.3 MG/DL (ref 0.2–1)
BILIRUB SERPL-MCNC: 1.1 MG/DL (ref 0.2–1)
BILIRUB UR QL: NEGATIVE
BLD PROD TYP BPU: NORMAL
BLOOD GROUP ANTIBODIES SERPL: NEGATIVE
BNP SERPL-MCNC: 4550 PG/ML
BODY TEMPERATURE: 96
BODY TEMPERATURE: 96.2
BODY TEMPERATURE: 97
BODY TEMPERATURE: 98.6
BODY TEMPERATURE: 99.6
BODY TEMPERATURE: 99.6
BPU ID: NORMAL
BUN SERPL-MCNC: 14 MG/DL (ref 6–20)
BUN SERPL-MCNC: 14 MG/DL (ref 6–20)
BUN SERPL-MCNC: 15 MG/DL (ref 6–20)
BUN SERPL-MCNC: 16 MG/DL (ref 6–20)
BUN SERPL-MCNC: 17 MG/DL (ref 6–20)
BUN SERPL-MCNC: 18 MG/DL (ref 6–20)
BUN SERPL-MCNC: 23 MG/DL (ref 6–20)
BUN SERPL-MCNC: 23 MG/DL (ref 6–20)
BUN SERPL-MCNC: 25 MG/DL (ref 6–20)
BUN SERPL-MCNC: 26 MG/DL (ref 6–20)
BUN/CREAT SERPL: 10 (ref 12–20)
BUN/CREAT SERPL: 11 (ref 12–20)
BUN/CREAT SERPL: 13 (ref 12–20)
BUN/CREAT SERPL: 7 (ref 12–20)
BUN/CREAT SERPL: 8 (ref 12–20)
CA-I BLD-MCNC: 6.7 MG/DL (ref 8.5–10.1)
CA-I BLD-MCNC: 7.2 MG/DL (ref 8.5–10.1)
CA-I BLD-MCNC: 7.4 MG/DL (ref 8.5–10.1)
CA-I BLD-MCNC: 7.5 MG/DL (ref 8.5–10.1)
CA-I BLD-MCNC: 7.5 MG/DL (ref 8.5–10.1)
CA-I BLD-MCNC: 7.8 MG/DL (ref 8.5–10.1)
CA-I BLD-MCNC: 7.9 MG/DL (ref 8.5–10.1)
CA-I BLD-MCNC: 9.2 MG/DL (ref 8.5–10.1)
CA-I BLD-MCNC: 9.2 MG/DL (ref 8.5–10.1)
CA-I BLD-MCNC: 9.5 MG/DL (ref 8.5–10.1)
CALCULATED R AXIS, ECG10: 103 DEGREES
CALCULATED R AXIS, ECG10: 118 DEGREES
CALCULATED T AXIS, ECG11: -21 DEGREES
CALCULATED T AXIS, ECG11: 12 DEGREES
CHLORIDE SERPL-SCNC: 89 MMOL/L (ref 97–108)
CHLORIDE SERPL-SCNC: 93 MMOL/L (ref 97–108)
CHLORIDE SERPL-SCNC: 94 MMOL/L (ref 97–108)
CHLORIDE SERPL-SCNC: 97 MMOL/L (ref 97–108)
CHLORIDE SERPL-SCNC: 98 MMOL/L (ref 97–108)
CHLORIDE SERPL-SCNC: 99 MMOL/L (ref 97–108)
CK SERPL-CCNC: 88 U/L (ref 26–192)
CO2 SERPL-SCNC: 14 MMOL/L (ref 21–32)
CO2 SERPL-SCNC: 15 MMOL/L (ref 21–32)
CO2 SERPL-SCNC: 18 MMOL/L (ref 21–32)
CO2 SERPL-SCNC: 21 MMOL/L (ref 21–32)
CO2 SERPL-SCNC: 22 MMOL/L (ref 21–32)
CO2 SERPL-SCNC: 22 MMOL/L (ref 21–32)
CO2 SERPL-SCNC: 25 MMOL/L (ref 21–32)
CO2 SERPL-SCNC: 26 MMOL/L (ref 21–32)
CO2 SERPL-SCNC: 29 MMOL/L (ref 21–32)
CO2 SERPL-SCNC: 37 MMOL/L (ref 21–32)
COHGB MFR BLD: 0.1 % (ref 1–2)
COHGB MFR BLD: 0.2 % (ref 1–2)
COHGB MFR BLD: 0.3 % (ref 1–2)
COHGB MFR BLD: 0.4 % (ref 1–2)
COLOR UR: YELLOW
CREAT SERPL-MCNC: 1.18 MG/DL (ref 0.55–1.02)
CREAT SERPL-MCNC: 1.75 MG/DL (ref 0.55–1.02)
CREAT SERPL-MCNC: 1.8 MG/DL (ref 0.55–1.02)
CREAT SERPL-MCNC: 2.11 MG/DL (ref 0.55–1.02)
CREAT SERPL-MCNC: 2.15 MG/DL (ref 0.55–1.02)
CREAT SERPL-MCNC: 2.21 MG/DL (ref 0.55–1.02)
CREAT SERPL-MCNC: 2.3 MG/DL (ref 0.55–1.02)
CREAT SERPL-MCNC: 2.38 MG/DL (ref 0.55–1.02)
CREAT SERPL-MCNC: 2.48 MG/DL (ref 0.55–1.02)
CREAT SERPL-MCNC: 2.55 MG/DL (ref 0.55–1.02)
CREAT UR-MCNC: 233 MG/DL
CROSSMATCH RESULT,%XM: NORMAL
CRP SERPL-MCNC: 0.68 MG/DL (ref 0–0.6)
CRP SERPL-MCNC: 1.2 MG/DL (ref 0–0.6)
D DIMER PPP FEU-MCNC: 15.61 UG/ML(FEU)
DIAGNOSIS, 93000: NORMAL
DIAGNOSIS, 93000: NORMAL
DIFFERENTIAL METHOD BLD: ABNORMAL
EOSINOPHIL # BLD: 0 K/UL (ref 0–0.4)
EOSINOPHIL # BLD: 0.1 K/UL (ref 0–0.4)
EOSINOPHIL NFR BLD: 0 % (ref 0–7)
EOSINOPHIL NFR BLD: 1 % (ref 0–7)
ERYTHROCYTE [DISTWIDTH] IN BLOOD BY AUTOMATED COUNT: 14.7 % (ref 11.5–14.5)
ERYTHROCYTE [DISTWIDTH] IN BLOOD BY AUTOMATED COUNT: 14.7 % (ref 11.5–14.5)
ERYTHROCYTE [DISTWIDTH] IN BLOOD BY AUTOMATED COUNT: 14.8 % (ref 11.5–14.5)
ERYTHROCYTE [DISTWIDTH] IN BLOOD BY AUTOMATED COUNT: 14.9 % (ref 11.5–14.5)
ERYTHROCYTE [DISTWIDTH] IN BLOOD BY AUTOMATED COUNT: 15 % (ref 11.5–14.5)
FIO2 ON VENT: 28 %
FIO2 ON VENT: 32 %
FIO2 ON VENT: 35 %
FIO2 ON VENT: 40 %
FIO2 ON VENT: 40 %
FIO2 ON VENT: 50 %
FLUAV RNA SPEC QL NAA+PROBE: NOT DETECTED
FLUBV RNA SPEC QL NAA+PROBE: NOT DETECTED
GAS FLOW.O2 SETTING OXYMISER: 16 L/MIN
GAS FLOW.O2 SETTING OXYMISER: 16 L/MIN
GAS FLOW.O2 SETTING OXYMISER: 18 L/MIN
GAS FLOW.O2 SETTING OXYMISER: 18 L/MIN
GAS FLOW.O2 SETTING OXYMISER: 22 L/MIN
GLOBULIN SER CALC-MCNC: 2.2 G/DL (ref 2–4)
GLOBULIN SER CALC-MCNC: 3.1 G/DL (ref 2–4)
GLUCOSE BLD STRIP.AUTO-MCNC: 155 MG/DL (ref 65–100)
GLUCOSE BLD STRIP.AUTO-MCNC: 251 MG/DL (ref 65–100)
GLUCOSE BLD STRIP.AUTO-MCNC: 29 MG/DL (ref 65–100)
GLUCOSE BLD STRIP.AUTO-MCNC: 339 MG/DL (ref 65–100)
GLUCOSE BLD STRIP.AUTO-MCNC: 37 MG/DL (ref 65–100)
GLUCOSE BLD STRIP.AUTO-MCNC: 49 MG/DL (ref 65–100)
GLUCOSE BLD STRIP.AUTO-MCNC: 64 MG/DL (ref 65–100)
GLUCOSE SERPL-MCNC: 112 MG/DL (ref 65–100)
GLUCOSE SERPL-MCNC: 158 MG/DL (ref 65–100)
GLUCOSE SERPL-MCNC: 160 MG/DL (ref 65–100)
GLUCOSE SERPL-MCNC: 213 MG/DL (ref 65–100)
GLUCOSE SERPL-MCNC: 55 MG/DL (ref 65–100)
GLUCOSE SERPL-MCNC: 57 MG/DL (ref 65–100)
GLUCOSE SERPL-MCNC: 58 MG/DL (ref 65–100)
GLUCOSE SERPL-MCNC: 59 MG/DL (ref 65–100)
GLUCOSE SERPL-MCNC: 71 MG/DL (ref 65–100)
GLUCOSE SERPL-MCNC: 71 MG/DL (ref 65–100)
GLUCOSE UR STRIP.AUTO-MCNC: NEGATIVE MG/DL
HBV SURFACE AB SER QL: REACTIVE
HBV SURFACE AB SER-ACNC: 28.07 MIU/ML
HBV SURFACE AG SER QL: 0.35 INDEX
HBV SURFACE AG SER QL: NEGATIVE
HCO3 BLDA-SCNC: 14 MMOL/L (ref 22–26)
HCO3 BLDA-SCNC: 20 MMOL/L (ref 22–26)
HCO3 BLDA-SCNC: 22 MMOL/L (ref 22–26)
HCO3 BLDA-SCNC: 26 MMOL/L (ref 22–26)
HCT VFR BLD AUTO: 23.8 % (ref 35–47)
HCT VFR BLD AUTO: 24.2 % (ref 35–47)
HCT VFR BLD AUTO: 28.1 % (ref 35–47)
HCT VFR BLD AUTO: 29.1 % (ref 35–47)
HCT VFR BLD AUTO: 31.3 % (ref 35–47)
HGB BLD-MCNC: 7 G/DL (ref 11.5–16)
HGB BLD-MCNC: 7.1 G/DL (ref 11.5–16)
HGB BLD-MCNC: 8 G/DL (ref 11.5–16)
HGB BLD-MCNC: 8.7 G/DL (ref 11.5–16)
HGB BLD-MCNC: 9.3 G/DL (ref 11.5–16)
HGB UR QL STRIP: NEGATIVE
IMM GRANULOCYTES # BLD AUTO: 0 K/UL
IMM GRANULOCYTES # BLD AUTO: 0 K/UL
IMM GRANULOCYTES # BLD AUTO: 0 K/UL (ref 0–0.04)
IMM GRANULOCYTES # BLD AUTO: 0.2 K/UL (ref 0–0.04)
IMM GRANULOCYTES NFR BLD AUTO: 0 %
IMM GRANULOCYTES NFR BLD AUTO: 0 %
IMM GRANULOCYTES NFR BLD AUTO: 1 % (ref 0–0.5)
IMM GRANULOCYTES NFR BLD AUTO: 2 % (ref 0–0.5)
IPAP/PIP, IPAPIP: 16
IPAP/PIP, IPAPIP: 6
KETONES UR QL STRIP.AUTO: NEGATIVE MG/DL
LACTATE SERPL-SCNC: 10.4 MMOL/L (ref 0.4–2)
LACTATE SERPL-SCNC: 11.6 MMOL/L (ref 0.4–2)
LACTATE SERPL-SCNC: 13.8 MMOL/L (ref 0.4–2)
LACTATE SERPL-SCNC: 18.9 MMOL/L (ref 0.4–2)
LACTATE SERPL-SCNC: 7.8 MMOL/L (ref 0.4–2)
LEUKOCYTE ESTERASE UR QL STRIP.AUTO: NEGATIVE
LYMPHOCYTES # BLD: 0.4 K/UL (ref 0.8–3.5)
LYMPHOCYTES # BLD: 0.8 K/UL (ref 0.8–3.5)
LYMPHOCYTES NFR BLD: 13 % (ref 12–49)
LYMPHOCYTES NFR BLD: 3 % (ref 12–49)
LYMPHOCYTES NFR BLD: 4 % (ref 12–49)
LYMPHOCYTES NFR BLD: 7 % (ref 12–49)
MAGNESIUM SERPL-MCNC: 1.9 MG/DL (ref 1.6–2.4)
MAGNESIUM SERPL-MCNC: 2.2 MG/DL (ref 1.6–2.4)
MAGNESIUM SERPL-MCNC: 2.5 MG/DL (ref 1.6–2.4)
MAGNESIUM SERPL-MCNC: NORMAL MG/DL (ref 1.6–2.4)
MCH RBC QN AUTO: 28.9 PG (ref 26–34)
MCH RBC QN AUTO: 29.2 PG (ref 26–34)
MCH RBC QN AUTO: 29.6 PG (ref 26–34)
MCHC RBC AUTO-ENTMCNC: 28.5 G/DL (ref 30–36.5)
MCHC RBC AUTO-ENTMCNC: 28.9 G/DL (ref 30–36.5)
MCHC RBC AUTO-ENTMCNC: 29.7 G/DL (ref 30–36.5)
MCHC RBC AUTO-ENTMCNC: 29.8 G/DL (ref 30–36.5)
MCHC RBC AUTO-ENTMCNC: 29.9 G/DL (ref 30–36.5)
MCV RBC AUTO: 100 FL (ref 80–99)
MCV RBC AUTO: 101.4 FL (ref 80–99)
MCV RBC AUTO: 96.7 FL (ref 80–99)
MCV RBC AUTO: 98.4 FL (ref 80–99)
MCV RBC AUTO: 99.2 FL (ref 80–99)
METHGB MFR BLD: 0.2 % (ref 0–1.4)
METHGB MFR BLD: 0.4 % (ref 0–1.4)
METHGB MFR BLD: 0.6 % (ref 0–1.4)
METHGB MFR BLD: 0.6 % (ref 0–1.4)
MONOCYTES # BLD: 0.4 K/UL (ref 0–1)
MONOCYTES # BLD: 0.5 K/UL (ref 0–1)
MONOCYTES # BLD: 0.6 K/UL (ref 0–1)
MONOCYTES # BLD: 1.3 K/UL (ref 0–1)
MONOCYTES NFR BLD: 10 % (ref 5–13)
MONOCYTES NFR BLD: 12 % (ref 5–13)
MONOCYTES NFR BLD: 5 % (ref 5–13)
MONOCYTES NFR BLD: 7 % (ref 5–13)
MRSA DNA SPEC QL NAA+PROBE: NOT DETECTED
NEUTS BAND NFR BLD MANUAL: 2 % (ref 0–6)
NEUTS BAND NFR BLD MANUAL: 3 % (ref 0–6)
NEUTS SEG # BLD: 10 K/UL (ref 1.8–8)
NEUTS SEG # BLD: 4.7 K/UL (ref 1.8–8)
NEUTS SEG # BLD: 5.6 K/UL (ref 1.8–8)
NEUTS SEG # BLD: 9.1 K/UL (ref 1.8–8)
NEUTS SEG NFR BLD: 75 % (ref 32–75)
NEUTS SEG NFR BLD: 80 % (ref 32–75)
NEUTS SEG NFR BLD: 84 % (ref 32–75)
NEUTS SEG NFR BLD: 90 % (ref 32–75)
NITRITE UR QL STRIP.AUTO: NEGATIVE
NRBC # BLD: 0 K/UL (ref 0–0.01)
NRBC # BLD: 0.1 K/UL (ref 0–0.01)
NRBC # BLD: 0.41 K/UL (ref 0–0.01)
NRBC # BLD: 1.65 K/UL (ref 0–0.01)
NRBC # BLD: 2.5 K/UL (ref 0–0.01)
NRBC BLD-RTO: 0 PER 100 WBC
NRBC BLD-RTO: 1.6 PER 100 WBC
NRBC BLD-RTO: 15.1 PER 100 WBC
NRBC BLD-RTO: 22.6 PER 100 WBC
NRBC BLD-RTO: 6.4 PER 100 WBC
OXYHGB MFR BLD: 89.4 % (ref 95–99)
OXYHGB MFR BLD: 92.3 % (ref 95–99)
OXYHGB MFR BLD: 94.1 % (ref 95–99)
OXYHGB MFR BLD: 94.1 % (ref 95–99)
OXYHGB MFR BLD: 95.7 % (ref 95–99)
OXYHGB MFR BLD: 97.6 % (ref 95–99)
PCO2 BLDA: 33 MMHG (ref 35–45)
PCO2 BLDA: 39 MMHG (ref 35–45)
PCO2 BLDA: 39 MMHG (ref 35–45)
PCO2 BLDA: 53 MMHG (ref 35–45)
PCO2 BLDA: 67 MMHG (ref 35–45)
PCO2 BLDA: 78 MMHG (ref 35–45)
PEEP MAX SETTING VENT: 28 CM[H2O]
PEEP RESPIRATORY: 5 CM[H2O]
PEEP RESPIRATORY: 5 CM[H2O]
PEEP RESPIRATORY: 6 CM[H2O]
PEEP RESPIRATORY: 6 CM[H2O]
PERFORMED BY, TECHID: ABNORMAL
PH BLDA: 7.12 [PH] (ref 7.35–7.45)
PH BLDA: 7.13 [PH] (ref 7.35–7.45)
PH BLDA: 7.2 [PH] (ref 7.35–7.45)
PH BLDA: 7.23 [PH] (ref 7.35–7.45)
PH BLDA: 7.33 [PH] (ref 7.35–7.45)
PH BLDA: 7.33 [PH] (ref 7.35–7.45)
PH UR STRIP: 5 [PH] (ref 5–8)
PHOSPHATE SERPL-MCNC: 4.4 MG/DL (ref 2.6–4.7)
PHOSPHATE SERPL-MCNC: 5.9 MG/DL (ref 2.6–4.7)
PHOSPHATE SERPL-MCNC: 6 MG/DL (ref 2.6–4.7)
PHOSPHATE SERPL-MCNC: 8.2 MG/DL (ref 2.6–4.7)
PHOSPHATE SERPL-MCNC: 8.6 MG/DL (ref 2.6–4.7)
PLATELET # BLD AUTO: 276 K/UL (ref 150–400)
PLATELET # BLD AUTO: 286 K/UL (ref 150–400)
PLATELET # BLD AUTO: 359 K/UL (ref 150–400)
PLATELET # BLD AUTO: 600 K/UL (ref 150–400)
PLATELET # BLD AUTO: 657 K/UL (ref 150–400)
PMV BLD AUTO: 10.2 FL (ref 8.9–12.9)
PMV BLD AUTO: 10.3 FL (ref 8.9–12.9)
PMV BLD AUTO: 9.4 FL (ref 8.9–12.9)
PMV BLD AUTO: 9.4 FL (ref 8.9–12.9)
PMV BLD AUTO: 9.6 FL (ref 8.9–12.9)
PO2 BLDA: 107 MMHG (ref 80–100)
PO2 BLDA: 138 MMHG (ref 80–100)
PO2 BLDA: 67 MMHG (ref 80–100)
PO2 BLDA: 74 MMHG (ref 80–100)
PO2 BLDA: 82 MMHG (ref 80–100)
PO2 BLDA: 82 MMHG (ref 80–100)
POTASSIUM SERPL-SCNC: 5 MMOL/L (ref 3.5–5.1)
POTASSIUM SERPL-SCNC: 5.1 MMOL/L (ref 3.5–5.1)
POTASSIUM SERPL-SCNC: 5.4 MMOL/L (ref 3.5–5.1)
POTASSIUM SERPL-SCNC: 5.7 MMOL/L (ref 3.5–5.1)
POTASSIUM SERPL-SCNC: 5.7 MMOL/L (ref 3.5–5.1)
POTASSIUM SERPL-SCNC: 6.6 MMOL/L (ref 3.5–5.1)
POTASSIUM SERPL-SCNC: 7 MMOL/L (ref 3.5–5.1)
POTASSIUM SERPL-SCNC: 7 MMOL/L (ref 3.5–5.1)
POTASSIUM SERPL-SCNC: 7.9 MMOL/L (ref 3.5–5.1)
POTASSIUM SERPL-SCNC: 8.2 MMOL/L (ref 3.5–5.1)
POTASSIUM SERPL-SCNC: ABNORMAL MMOL/L (ref 3.5–5.1)
PROCALCITONIN SERPL-MCNC: 0.33 NG/ML
PROCALCITONIN SERPL-MCNC: 0.62 NG/ML
PROCALCITONIN SERPL-MCNC: 2.07 NG/ML
PROCALCITONIN SERPL-MCNC: <0.05 NG/ML
PROT SERPL-MCNC: 4.4 G/DL (ref 6.4–8.2)
PROT SERPL-MCNC: 6.6 G/DL (ref 6.4–8.2)
PROT UR STRIP-MCNC: ABNORMAL MG/DL
Q-T INTERVAL, ECG07: 298 MS
Q-T INTERVAL, ECG07: 460 MS
QRS DURATION, ECG06: 96 MS
QRS DURATION, ECG06: 98 MS
QTC CALCULATION (BEZET), ECG08: 473 MS
QTC CALCULATION (BEZET), ECG08: 590 MS
RBC # BLD AUTO: 2.4 M/UL (ref 3.8–5.2)
RBC # BLD AUTO: 2.42 M/UL (ref 3.8–5.2)
RBC # BLD AUTO: 2.77 M/UL (ref 3.8–5.2)
RBC # BLD AUTO: 3.01 M/UL (ref 3.8–5.2)
RBC # BLD AUTO: 3.18 M/UL (ref 3.8–5.2)
RBC #/AREA URNS HPF: ABNORMAL /HPF (ref 0–5)
RBC MORPH BLD: ABNORMAL
SAO2 % BLD: 90 % (ref 95–99)
SAO2 % BLD: 93 % (ref 95–99)
SAO2 % BLD: 95 % (ref 95–99)
SAO2 % BLD: 95 % (ref 95–99)
SAO2 % BLD: 97 % (ref 95–99)
SAO2 % BLD: 98 % (ref 95–99)
SAO2% DEVICE SAO2% SENSOR NAME: ABNORMAL
SARS-COV-2, COV2: NOT DETECTED
SERVICE CMNT-IMP: ABNORMAL
SODIUM SERPL-SCNC: 130 MMOL/L (ref 136–145)
SODIUM SERPL-SCNC: 131 MMOL/L (ref 136–145)
SODIUM SERPL-SCNC: 132 MMOL/L (ref 136–145)
SODIUM SERPL-SCNC: 134 MMOL/L (ref 136–145)
SODIUM SERPL-SCNC: 135 MMOL/L (ref 136–145)
SODIUM SERPL-SCNC: 136 MMOL/L (ref 136–145)
SODIUM SERPL-SCNC: 137 MMOL/L (ref 136–145)
SODIUM SERPL-SCNC: 137 MMOL/L (ref 136–145)
SODIUM UR-SCNC: <5 MMOL/L
SP GR UR REFRACTOMETRY: 1.02 (ref 1–1.03)
SPECIMEN EXP DATE BLD: NORMAL
SPECIMEN SITE: ABNORMAL
STATUS OF UNIT,%ST: NORMAL
T4 FREE SERPL-MCNC: 1.3 NG/DL (ref 0.8–1.5)
TRANSFUSION STATUS PATIENT QL: NORMAL
TROPONIN-HIGH SENSITIVITY: 43 NG/L (ref 0–51)
TSH SERPL DL<=0.05 MIU/L-ACNC: 2.05 UIU/ML (ref 0.36–3.74)
UNIT DIVISION, %UDIV: 0
URATE SERPL-MCNC: 7.1 MG/DL (ref 2.6–6)
UROBILINOGEN UR QL STRIP.AUTO: NORMAL EU/DL (ref 0.1–1)
VENTILATION MODE VENT: ABNORMAL
VENTRICULAR RATE, ECG03: 152 BPM
VENTRICULAR RATE, ECG03: 99 BPM
VT SETTING VENT: 400 ML
VT SETTING VENT: 450 ML
WBC # BLD AUTO: 10.9 K/UL (ref 3.6–11)
WBC # BLD AUTO: 11.1 K/UL (ref 3.6–11)
WBC # BLD AUTO: 6.2 K/UL (ref 3.6–11)
WBC # BLD AUTO: 6.2 K/UL (ref 3.6–11)
WBC # BLD AUTO: 6.4 K/UL (ref 3.6–11)
WBC URNS QL MICRO: ABNORMAL /HPF (ref 0–4)

## 2022-01-01 PROCEDURE — 74011250636 HC RX REV CODE- 250/636: Performed by: INTERNAL MEDICINE

## 2022-01-01 PROCEDURE — 94664 DEMO&/EVAL PT USE INHALER: CPT

## 2022-01-01 PROCEDURE — 74011250636 HC RX REV CODE- 250/636: Performed by: HOSPITALIST

## 2022-01-01 PROCEDURE — 74011000250 HC RX REV CODE- 250: Performed by: INTERNAL MEDICINE

## 2022-01-01 PROCEDURE — 74011636637 HC RX REV CODE- 636/637: Performed by: INTERNAL MEDICINE

## 2022-01-01 PROCEDURE — 74011000250 HC RX REV CODE- 250: Performed by: HOSPITALIST

## 2022-01-01 PROCEDURE — 71045 X-RAY EXAM CHEST 1 VIEW: CPT

## 2022-01-01 PROCEDURE — 74011250636 HC RX REV CODE- 250/636

## 2022-01-01 PROCEDURE — 86706 HEP B SURFACE ANTIBODY: CPT

## 2022-01-01 PROCEDURE — 82803 BLOOD GASES ANY COMBINATION: CPT

## 2022-01-01 PROCEDURE — 85025 COMPLETE CBC W/AUTO DIFF WBC: CPT

## 2022-01-01 PROCEDURE — 5A09457 ASSISTANCE WITH RESPIRATORY VENTILATION, 24-96 CONSECUTIVE HOURS, CONTINUOUS POSITIVE AIRWAY PRESSURE: ICD-10-PCS | Performed by: INTERNAL MEDICINE

## 2022-01-01 PROCEDURE — 87636 SARSCOV2 & INF A&B AMP PRB: CPT

## 2022-01-01 PROCEDURE — 86140 C-REACTIVE PROTEIN: CPT

## 2022-01-01 PROCEDURE — 82962 GLUCOSE BLOOD TEST: CPT

## 2022-01-01 PROCEDURE — 77010033678 HC OXYGEN DAILY

## 2022-01-01 PROCEDURE — 74011000250 HC RX REV CODE- 250: Performed by: EMERGENCY MEDICINE

## 2022-01-01 PROCEDURE — 99222 1ST HOSP IP/OBS MODERATE 55: CPT | Performed by: SURGERY

## 2022-01-01 PROCEDURE — 82550 ASSAY OF CK (CPK): CPT

## 2022-01-01 PROCEDURE — 76937 US GUIDE VASCULAR ACCESS: CPT

## 2022-01-01 PROCEDURE — 74011250636 HC RX REV CODE- 250/636: Performed by: EMERGENCY MEDICINE

## 2022-01-01 PROCEDURE — 36556 INSERT NON-TUNNEL CV CATH: CPT

## 2022-01-01 PROCEDURE — 83880 ASSAY OF NATRIURETIC PEPTIDE: CPT

## 2022-01-01 PROCEDURE — 74011250637 HC RX REV CODE- 250/637: Performed by: INTERNAL MEDICINE

## 2022-01-01 PROCEDURE — 96376 TX/PRO/DX INJ SAME DRUG ADON: CPT

## 2022-01-01 PROCEDURE — 94640 AIRWAY INHALATION TREATMENT: CPT

## 2022-01-01 PROCEDURE — 74011000258 HC RX REV CODE- 258: Performed by: INTERNAL MEDICINE

## 2022-01-01 PROCEDURE — 93970 EXTREMITY STUDY: CPT

## 2022-01-01 PROCEDURE — 93005 ELECTROCARDIOGRAM TRACING: CPT

## 2022-01-01 PROCEDURE — 74011000250 HC RX REV CODE- 250

## 2022-01-01 PROCEDURE — 84100 ASSAY OF PHOSPHORUS: CPT

## 2022-01-01 PROCEDURE — 74176 CT ABD & PELVIS W/O CONTRAST: CPT

## 2022-01-01 PROCEDURE — 84300 ASSAY OF URINE SODIUM: CPT

## 2022-01-01 PROCEDURE — 80053 COMPREHEN METABOLIC PANEL: CPT

## 2022-01-01 PROCEDURE — C1894 INTRO/SHEATH, NON-LASER: HCPCS

## 2022-01-01 PROCEDURE — 80069 RENAL FUNCTION PANEL: CPT

## 2022-01-01 PROCEDURE — 0BH17EZ INSERTION OF ENDOTRACHEAL AIRWAY INTO TRACHEA, VIA NATURAL OR ARTIFICIAL OPENING: ICD-10-PCS | Performed by: INTERNAL MEDICINE

## 2022-01-01 PROCEDURE — 74011000258 HC RX REV CODE- 258: Performed by: HOSPITALIST

## 2022-01-01 PROCEDURE — 36600 WITHDRAWAL OF ARTERIAL BLOOD: CPT

## 2022-01-01 PROCEDURE — 86900 BLOOD TYPING SEROLOGIC ABO: CPT

## 2022-01-01 PROCEDURE — 90935 HEMODIALYSIS ONE EVALUATION: CPT

## 2022-01-01 PROCEDURE — 36415 COLL VENOUS BLD VENIPUNCTURE: CPT

## 2022-01-01 PROCEDURE — 83735 ASSAY OF MAGNESIUM: CPT

## 2022-01-01 PROCEDURE — 83605 ASSAY OF LACTIC ACID: CPT

## 2022-01-01 PROCEDURE — 84145 PROCALCITONIN (PCT): CPT

## 2022-01-01 PROCEDURE — 51798 US URINE CAPACITY MEASURE: CPT

## 2022-01-01 PROCEDURE — P9047 ALBUMIN (HUMAN), 25%, 50ML: HCPCS | Performed by: HOSPITALIST

## 2022-01-01 PROCEDURE — 94003 VENT MGMT INPAT SUBQ DAY: CPT

## 2022-01-01 PROCEDURE — 81003 URINALYSIS AUTO W/O SCOPE: CPT

## 2022-01-01 PROCEDURE — 85379 FIBRIN DEGRADATION QUANT: CPT

## 2022-01-01 PROCEDURE — 96374 THER/PROPH/DIAG INJ IV PUSH: CPT

## 2022-01-01 PROCEDURE — 74011000258 HC RX REV CODE- 258: Performed by: EMERGENCY MEDICINE

## 2022-01-01 PROCEDURE — 80048 BASIC METABOLIC PNL TOTAL CA: CPT

## 2022-01-01 PROCEDURE — 99285 EMERGENCY DEPT VISIT HI MDM: CPT

## 2022-01-01 PROCEDURE — 84443 ASSAY THYROID STIM HORMONE: CPT

## 2022-01-01 PROCEDURE — 82330 ASSAY OF CALCIUM: CPT

## 2022-01-01 PROCEDURE — 84484 ASSAY OF TROPONIN QUANT: CPT

## 2022-01-01 PROCEDURE — 86923 COMPATIBILITY TEST ELECTRIC: CPT

## 2022-01-01 PROCEDURE — 65610000006 HC RM INTENSIVE CARE

## 2022-01-01 PROCEDURE — 94002 VENT MGMT INPAT INIT DAY: CPT

## 2022-01-01 PROCEDURE — 87641 MR-STAPH DNA AMP PROBE: CPT

## 2022-01-01 PROCEDURE — 99223 1ST HOSP IP/OBS HIGH 75: CPT | Performed by: INTERNAL MEDICINE

## 2022-01-01 PROCEDURE — 94660 CPAP INITIATION&MGMT: CPT

## 2022-01-01 PROCEDURE — 82570 ASSAY OF URINE CREATININE: CPT

## 2022-01-01 PROCEDURE — 85027 COMPLETE CBC AUTOMATED: CPT

## 2022-01-01 PROCEDURE — 87340 HEPATITIS B SURFACE AG IA: CPT

## 2022-01-01 PROCEDURE — 5A1935Z RESPIRATORY VENTILATION, LESS THAN 24 CONSECUTIVE HOURS: ICD-10-PCS | Performed by: INTERNAL MEDICINE

## 2022-01-01 PROCEDURE — 87040 BLOOD CULTURE FOR BACTERIA: CPT

## 2022-01-01 PROCEDURE — 84132 ASSAY OF SERUM POTASSIUM: CPT

## 2022-01-01 PROCEDURE — 84550 ASSAY OF BLOOD/URIC ACID: CPT

## 2022-01-01 PROCEDURE — 99291 CRITICAL CARE FIRST HOUR: CPT | Performed by: SURGERY

## 2022-01-01 PROCEDURE — 84439 ASSAY OF FREE THYROXINE: CPT

## 2022-01-01 PROCEDURE — 76770 US EXAM ABDO BACK WALL COMP: CPT

## 2022-01-01 PROCEDURE — 05HM33Z INSERTION OF INFUSION DEVICE INTO RIGHT INTERNAL JUGULAR VEIN, PERCUTANEOUS APPROACH: ICD-10-PCS | Performed by: INTERNAL MEDICINE

## 2022-01-01 PROCEDURE — 90945 DIALYSIS ONE EVALUATION: CPT

## 2022-01-01 RX ORDER — SODIUM BICARBONATE 1 MEQ/ML
100 SYRINGE (ML) INTRAVENOUS ONCE
Status: COMPLETED | OUTPATIENT
Start: 2022-01-01 | End: 2022-01-01

## 2022-01-01 RX ORDER — BUDESONIDE 0.5 MG/2ML
500 INHALANT ORAL
Status: DISCONTINUED | OUTPATIENT
Start: 2022-01-01 | End: 2022-01-01 | Stop reason: HOSPADM

## 2022-01-01 RX ORDER — GABAPENTIN 300 MG/1
300 CAPSULE ORAL
Status: DISCONTINUED | OUTPATIENT
Start: 2022-01-01 | End: 2022-01-01 | Stop reason: HOSPADM

## 2022-01-01 RX ORDER — SODIUM CHLORIDE 9 MG/ML
100 INJECTION, SOLUTION INTRAVENOUS CONTINUOUS
Status: DISPENSED | OUTPATIENT
Start: 2022-01-01 | End: 2022-01-01

## 2022-01-01 RX ORDER — DIGOXIN 0.25 MG/ML
250 INJECTION INTRAMUSCULAR; INTRAVENOUS
Status: DISCONTINUED | OUTPATIENT
Start: 2022-01-01 | End: 2022-01-01 | Stop reason: HOSPADM

## 2022-01-01 RX ORDER — ONDANSETRON 2 MG/ML
4 INJECTION INTRAMUSCULAR; INTRAVENOUS
Status: DISCONTINUED | OUTPATIENT
Start: 2022-01-01 | End: 2022-01-01 | Stop reason: HOSPADM

## 2022-01-01 RX ORDER — DULOXETIN HYDROCHLORIDE 20 MG/1
20 CAPSULE, DELAYED RELEASE ORAL DAILY
Status: DISCONTINUED | OUTPATIENT
Start: 2022-01-01 | End: 2022-01-01 | Stop reason: HOSPADM

## 2022-01-01 RX ORDER — ONDANSETRON 2 MG/ML
4 INJECTION INTRAMUSCULAR; INTRAVENOUS
Status: COMPLETED | OUTPATIENT
Start: 2022-01-01 | End: 2022-01-01

## 2022-01-01 RX ORDER — LEVOTHYROXINE SODIUM 25 UG/1
75 TABLET ORAL
Status: DISCONTINUED | OUTPATIENT
Start: 2022-01-01 | End: 2022-01-01 | Stop reason: HOSPADM

## 2022-01-01 RX ORDER — BUDESONIDE AND FORMOTEROL FUMARATE DIHYDRATE 160; 4.5 UG/1; UG/1
1 AEROSOL RESPIRATORY (INHALATION)
Status: DISCONTINUED | OUTPATIENT
Start: 2022-01-01 | End: 2022-01-01 | Stop reason: HOSPADM

## 2022-01-01 RX ORDER — VERAPAMIL HYDROCHLORIDE 80 MG/1
80 TABLET ORAL 3 TIMES DAILY
Status: DISCONTINUED | OUTPATIENT
Start: 2022-01-01 | End: 2022-01-01 | Stop reason: HOSPADM

## 2022-01-01 RX ORDER — MIDAZOLAM HYDROCHLORIDE 1 MG/ML
INJECTION, SOLUTION INTRAMUSCULAR; INTRAVENOUS
Status: COMPLETED
Start: 2022-01-01 | End: 2022-01-01

## 2022-01-01 RX ORDER — IPRATROPIUM BROMIDE AND ALBUTEROL SULFATE 2.5; .5 MG/3ML; MG/3ML
3 SOLUTION RESPIRATORY (INHALATION)
Status: DISCONTINUED | OUTPATIENT
Start: 2022-01-01 | End: 2022-01-01

## 2022-01-01 RX ORDER — ROCURONIUM BROMIDE 10 MG/ML
INJECTION, SOLUTION INTRAVENOUS
Status: DISPENSED
Start: 2022-01-01 | End: 2022-01-01

## 2022-01-01 RX ORDER — SODIUM CHLORIDE 9 MG/ML
100 INJECTION, SOLUTION INTRAVENOUS CONTINUOUS
Status: DISCONTINUED | OUTPATIENT
Start: 2022-01-01 | End: 2022-01-01

## 2022-01-01 RX ORDER — ALBUMIN HUMAN 250 G/1000ML
25 SOLUTION INTRAVENOUS
Status: DISCONTINUED | OUTPATIENT
Start: 2022-01-01 | End: 2022-01-01 | Stop reason: HOSPADM

## 2022-01-01 RX ORDER — OMEPRAZOLE 20 MG/1
20 CAPSULE, DELAYED RELEASE ORAL DAILY
COMMUNITY

## 2022-01-01 RX ORDER — SODIUM CHLORIDE 0.9 % (FLUSH) 0.9 %
5-40 SYRINGE (ML) INJECTION AS NEEDED
Status: DISCONTINUED | OUTPATIENT
Start: 2022-01-01 | End: 2022-01-01 | Stop reason: HOSPADM

## 2022-01-01 RX ORDER — ACETAMINOPHEN 650 MG/1
650 SUPPOSITORY RECTAL
Status: DISCONTINUED | OUTPATIENT
Start: 2022-01-01 | End: 2022-01-01 | Stop reason: HOSPADM

## 2022-01-01 RX ORDER — NOREPINEPHRINE BIT/0.9 % NACL 8 MG/250ML
INFUSION BOTTLE (ML) INTRAVENOUS
Status: COMPLETED
Start: 2022-01-01 | End: 2022-01-01

## 2022-01-01 RX ORDER — FUROSEMIDE 20 MG/1
20 TABLET ORAL DAILY
COMMUNITY

## 2022-01-01 RX ORDER — DILTIAZEM HYDROCHLORIDE 5 MG/ML
10 INJECTION INTRAVENOUS
Status: DISCONTINUED | OUTPATIENT
Start: 2022-01-01 | End: 2022-01-01

## 2022-01-01 RX ORDER — HYDROCORTISONE SODIUM SUCCINATE 100 MG/2ML
100 INJECTION, POWDER, FOR SOLUTION INTRAMUSCULAR; INTRAVENOUS EVERY 6 HOURS
Status: DISCONTINUED | OUTPATIENT
Start: 2022-01-01 | End: 2022-01-01 | Stop reason: HOSPADM

## 2022-01-01 RX ORDER — CALCIUM GLUCONATE 20 MG/ML
1 INJECTION, SOLUTION INTRAVENOUS ONCE
Status: DISCONTINUED | OUTPATIENT
Start: 2022-01-01 | End: 2022-01-01

## 2022-01-01 RX ORDER — METOPROLOL TARTRATE 50 MG/1
50 TABLET ORAL 2 TIMES DAILY
Status: DISCONTINUED | OUTPATIENT
Start: 2022-01-01 | End: 2022-01-01 | Stop reason: HOSPADM

## 2022-01-01 RX ORDER — MONTELUKAST SODIUM 10 MG/1
10 TABLET ORAL DAILY
Status: DISCONTINUED | OUTPATIENT
Start: 2022-01-01 | End: 2022-01-01 | Stop reason: HOSPADM

## 2022-01-01 RX ORDER — HEPARIN SODIUM 1000 [USP'U]/ML
2500 INJECTION, SOLUTION INTRAVENOUS; SUBCUTANEOUS
Status: DISCONTINUED | OUTPATIENT
Start: 2022-01-01 | End: 2022-01-01 | Stop reason: HOSPADM

## 2022-01-01 RX ORDER — SODIUM BICARBONATE 1 MEQ/ML
SYRINGE (ML) INTRAVENOUS
Status: DISCONTINUED
Start: 2022-01-01 | End: 2022-01-01 | Stop reason: HOSPADM

## 2022-01-01 RX ORDER — HYDROXYCHLOROQUINE SULFATE 200 MG/1
200 TABLET, FILM COATED ORAL
Status: DISCONTINUED | OUTPATIENT
Start: 2022-01-01 | End: 2022-01-01 | Stop reason: HOSPADM

## 2022-01-01 RX ORDER — GABAPENTIN 300 MG/1
600 CAPSULE ORAL 3 TIMES DAILY
Status: DISCONTINUED | OUTPATIENT
Start: 2022-01-01 | End: 2022-01-01

## 2022-01-01 RX ORDER — FLUTICASONE PROPIONATE 50 MCG
SPRAY, SUSPENSION (ML) NASAL
COMMUNITY

## 2022-01-01 RX ORDER — SODIUM CHLORIDE 9 MG/ML
250 INJECTION, SOLUTION INTRAVENOUS AS NEEDED
Status: DISCONTINUED | OUTPATIENT
Start: 2022-01-01 | End: 2022-01-01 | Stop reason: HOSPADM

## 2022-01-01 RX ORDER — ACETAMINOPHEN 325 MG/1
650 TABLET ORAL
Status: DISCONTINUED | OUTPATIENT
Start: 2022-01-01 | End: 2022-01-01 | Stop reason: HOSPADM

## 2022-01-01 RX ORDER — DULOXETIN HYDROCHLORIDE 30 MG/1
60 CAPSULE, DELAYED RELEASE ORAL DAILY
Status: DISCONTINUED | OUTPATIENT
Start: 2022-01-01 | End: 2022-01-01

## 2022-01-01 RX ORDER — ROPINIROLE 0.5 MG/1
0.5 TABLET, FILM COATED ORAL EVERY EVENING
COMMUNITY

## 2022-01-01 RX ORDER — FAMOTIDINE 20 MG/1
TABLET, FILM COATED ORAL
COMMUNITY

## 2022-01-01 RX ORDER — DIGOXIN 0.25 MG/ML
250 INJECTION INTRAMUSCULAR; INTRAVENOUS
Status: COMPLETED | OUTPATIENT
Start: 2022-01-01 | End: 2022-01-01

## 2022-01-01 RX ORDER — DILTIAZEM HYDROCHLORIDE 120 MG/1
240 CAPSULE, COATED, EXTENDED RELEASE ORAL 2 TIMES DAILY
Status: DISCONTINUED | OUTPATIENT
Start: 2022-01-01 | End: 2022-01-01 | Stop reason: HOSPADM

## 2022-01-01 RX ORDER — NOREPINEPHRINE BIT/0.9 % NACL 8 MG/250ML
.5-3 INFUSION BOTTLE (ML) INTRAVENOUS
Status: DISCONTINUED | OUTPATIENT
Start: 2022-01-01 | End: 2022-01-01 | Stop reason: HOSPADM

## 2022-01-01 RX ORDER — DILTIAZEM HYDROCHLORIDE 5 MG/ML
20 INJECTION INTRAVENOUS ONCE
Status: COMPLETED | OUTPATIENT
Start: 2022-01-01 | End: 2022-01-01

## 2022-01-01 RX ORDER — SODIUM CHLORIDE 0.9 % (FLUSH) 0.9 %
5-40 SYRINGE (ML) INJECTION EVERY 8 HOURS
Status: DISCONTINUED | OUTPATIENT
Start: 2022-01-01 | End: 2022-01-01 | Stop reason: HOSPADM

## 2022-01-01 RX ORDER — SODIUM POLYSTYRENE SULFONATE 15 G/60ML
30 SUSPENSION ORAL; RECTAL
Status: COMPLETED | OUTPATIENT
Start: 2022-01-01 | End: 2022-01-01

## 2022-01-01 RX ORDER — DEXTROSE MONOHYDRATE 100 MG/ML
250 INJECTION, SOLUTION INTRAVENOUS ONCE
Status: DISCONTINUED | OUTPATIENT
Start: 2022-01-01 | End: 2022-01-01

## 2022-01-01 RX ORDER — ALBUTEROL SULFATE 2.5 MG/.5ML
2.5 SOLUTION RESPIRATORY (INHALATION)
Status: DISCONTINUED | OUTPATIENT
Start: 2022-01-01 | End: 2022-01-01 | Stop reason: SDUPTHER

## 2022-01-01 RX ORDER — ATORVASTATIN CALCIUM 10 MG/1
10 TABLET, FILM COATED ORAL DAILY
Status: DISCONTINUED | OUTPATIENT
Start: 2022-01-01 | End: 2022-01-01 | Stop reason: HOSPADM

## 2022-01-01 RX ORDER — ONDANSETRON 4 MG/1
4 TABLET, ORALLY DISINTEGRATING ORAL
Status: DISCONTINUED | OUTPATIENT
Start: 2022-01-01 | End: 2022-01-01 | Stop reason: HOSPADM

## 2022-01-01 RX ORDER — FENTANYL CITRATE 50 UG/ML
25 INJECTION, SOLUTION INTRAMUSCULAR; INTRAVENOUS
Status: DISCONTINUED | OUTPATIENT
Start: 2022-01-01 | End: 2022-01-01 | Stop reason: HOSPADM

## 2022-01-01 RX ORDER — DEXTROSE MONOHYDRATE 100 MG/ML
250 INJECTION, SOLUTION INTRAVENOUS ONCE
Status: COMPLETED | OUTPATIENT
Start: 2022-01-01 | End: 2022-01-01

## 2022-01-01 RX ORDER — ETOMIDATE 2 MG/ML
INJECTION INTRAVENOUS
Status: COMPLETED
Start: 2022-01-01 | End: 2022-01-01

## 2022-01-01 RX ORDER — IPRATROPIUM BROMIDE AND ALBUTEROL SULFATE 2.5; .5 MG/3ML; MG/3ML
3 SOLUTION RESPIRATORY (INHALATION)
Status: DISCONTINUED | OUTPATIENT
Start: 2022-01-01 | End: 2022-01-01 | Stop reason: HOSPADM

## 2022-01-01 RX ORDER — DILTIAZEM HYDROCHLORIDE 5 MG/ML
10 INJECTION INTRAVENOUS
Status: COMPLETED | OUTPATIENT
Start: 2022-01-01 | End: 2022-01-01

## 2022-01-01 RX ORDER — FUROSEMIDE 10 MG/ML
40 INJECTION INTRAMUSCULAR; INTRAVENOUS DAILY
Status: DISCONTINUED | OUTPATIENT
Start: 2022-01-01 | End: 2022-01-01 | Stop reason: HOSPADM

## 2022-01-01 RX ORDER — ALBUMIN HUMAN 250 G/1000ML
50 SOLUTION INTRAVENOUS ONCE
Status: COMPLETED | OUTPATIENT
Start: 2022-01-01 | End: 2022-01-01

## 2022-01-01 RX ORDER — PANTOPRAZOLE SODIUM 40 MG/1
40 TABLET, DELAYED RELEASE ORAL DAILY
Status: DISCONTINUED | OUTPATIENT
Start: 2022-01-01 | End: 2022-01-01 | Stop reason: HOSPADM

## 2022-01-01 RX ORDER — DIGOXIN 0.25 MG/ML
250 INJECTION INTRAMUSCULAR; INTRAVENOUS DAILY
Status: DISCONTINUED | OUTPATIENT
Start: 2022-01-01 | End: 2022-01-01 | Stop reason: HOSPADM

## 2022-01-01 RX ORDER — DEXTROSE MONOHYDRATE 100 MG/ML
0-250 INJECTION, SOLUTION INTRAVENOUS AS NEEDED
Status: DISCONTINUED | OUTPATIENT
Start: 2022-01-01 | End: 2022-01-01 | Stop reason: HOSPADM

## 2022-01-01 RX ORDER — DEXMEDETOMIDINE HYDROCHLORIDE 4 UG/ML
.1-1.5 INJECTION, SOLUTION INTRAVENOUS
Status: DISCONTINUED | OUTPATIENT
Start: 2022-01-01 | End: 2022-01-01 | Stop reason: HOSPADM

## 2022-01-01 RX ADMIN — METHYLPREDNISOLONE SODIUM SUCCINATE 125 MG: 125 INJECTION, POWDER, FOR SOLUTION INTRAMUSCULAR; INTRAVENOUS at 16:04

## 2022-01-01 RX ADMIN — DEXMEDETOMIDINE HYDROCHLORIDE 0.4 MCG/KG/HR: 4 INJECTION, SOLUTION INTRAVENOUS at 19:12

## 2022-01-01 RX ADMIN — Medication 25 MCG/MIN: at 15:22

## 2022-01-01 RX ADMIN — ETOMIDATE 14 MG: 2 INJECTION, SOLUTION INTRAVENOUS at 21:04

## 2022-01-01 RX ADMIN — Medication 30 MCG/MIN: at 19:11

## 2022-01-01 RX ADMIN — PANTOPRAZOLE SODIUM 40 MG: 40 TABLET, DELAYED RELEASE ORAL at 08:48

## 2022-01-01 RX ADMIN — GABAPENTIN 600 MG: 300 CAPSULE ORAL at 23:29

## 2022-01-01 RX ADMIN — VASOPRESSIN 0.03 UNITS/MIN: 20 INJECTION PARENTERAL at 00:54

## 2022-01-01 RX ADMIN — FENTANYL CITRATE 25 MCG: 50 INJECTION, SOLUTION INTRAMUSCULAR; INTRAVENOUS at 04:25

## 2022-01-01 RX ADMIN — Medication 150 MCG/HR: at 01:29

## 2022-01-01 RX ADMIN — DILTIAZEM HYDROCHLORIDE 10 MG: 5 INJECTION INTRAVENOUS at 14:12

## 2022-01-01 RX ADMIN — DIGOXIN 250 MCG: 0.25 INJECTION INTRAMUSCULAR; INTRAVENOUS at 11:16

## 2022-01-01 RX ADMIN — Medication 100 MCG/MIN: at 22:40

## 2022-01-01 RX ADMIN — SODIUM CHLORIDE 500 ML: 9 INJECTION, SOLUTION INTRAVENOUS at 17:00

## 2022-01-01 RX ADMIN — Medication 65 MCG/MIN: at 22:07

## 2022-01-01 RX ADMIN — SODIUM CHLORIDE 100 ML/HR: 9 INJECTION, SOLUTION INTRAVENOUS at 11:16

## 2022-01-01 RX ADMIN — Medication 60 MCG/MIN: at 21:56

## 2022-01-01 RX ADMIN — DILTIAZEM HYDROCHLORIDE 15 MG/HR: 5 INJECTION, SOLUTION INTRAVENOUS at 23:40

## 2022-01-01 RX ADMIN — ATORVASTATIN CALCIUM 10 MG: 10 TABLET, FILM COATED ORAL at 08:48

## 2022-01-01 RX ADMIN — SODIUM CHLORIDE, PRESERVATIVE FREE 10 ML: 5 INJECTION INTRAVENOUS at 05:43

## 2022-01-01 RX ADMIN — DEXMEDETOMIDINE HYDROCHLORIDE 0.4 MCG/KG/HR: 4 INJECTION, SOLUTION INTRAVENOUS at 23:47

## 2022-01-01 RX ADMIN — DEXTROSE MONOHYDRATE 250 ML: 100 INJECTION, SOLUTION INTRAVENOUS at 22:52

## 2022-01-01 RX ADMIN — DEXTROSE MONOHYDRATE 250 ML: 100 INJECTION, SOLUTION INTRAVENOUS at 08:54

## 2022-01-01 RX ADMIN — HEPARIN SODIUM 2500 UNITS: 1000 INJECTION, SOLUTION INTRAVENOUS; SUBCUTANEOUS at 18:33

## 2022-01-01 RX ADMIN — Medication 50 MCG/MIN: at 21:46

## 2022-01-01 RX ADMIN — SODIUM BICARBONATE 100 MEQ: 84 INJECTION INTRAVENOUS at 01:46

## 2022-01-01 RX ADMIN — SODIUM BICARBONATE 100 MEQ: 84 INJECTION INTRAVENOUS at 06:54

## 2022-01-01 RX ADMIN — PIPERACILLIN AND TAZOBACTAM 3.38 G: 3; .375 INJECTION, POWDER, FOR SOLUTION INTRAVENOUS at 08:14

## 2022-01-01 RX ADMIN — BUDESONIDE AND FORMOTEROL FUMARATE DIHYDRATE 1 PUFF: 160; 4.5 AEROSOL RESPIRATORY (INHALATION) at 18:06

## 2022-01-01 RX ADMIN — DEXTROSE MONOHYDRATE 250 ML: 100 INJECTION, SOLUTION INTRAVENOUS at 07:23

## 2022-01-01 RX ADMIN — INSULIN HUMAN 10 UNITS: 100 INJECTION, SOLUTION PARENTERAL at 08:00

## 2022-01-01 RX ADMIN — Medication 4 MCG/MIN: at 01:58

## 2022-01-01 RX ADMIN — Medication 30 MCG/MIN: at 03:40

## 2022-01-01 RX ADMIN — GABAPENTIN 600 MG: 300 CAPSULE ORAL at 08:48

## 2022-01-01 RX ADMIN — DILTIAZEM HYDROCHLORIDE 5 MG/HR: 5 INJECTION, SOLUTION INTRAVENOUS at 14:21

## 2022-01-01 RX ADMIN — SODIUM CHLORIDE, PRESERVATIVE FREE 10 ML: 5 INJECTION INTRAVENOUS at 22:00

## 2022-01-01 RX ADMIN — EPINEPHRINE 1 MCG/MIN: 1 INJECTION INTRAMUSCULAR; INTRAVENOUS; SUBCUTANEOUS at 23:17

## 2022-01-01 RX ADMIN — Medication 70 MCG/MIN: at 22:20

## 2022-01-01 RX ADMIN — DILTIAZEM HYDROCHLORIDE 20 MG: 5 INJECTION INTRAVENOUS at 17:52

## 2022-01-01 RX ADMIN — SODIUM CHLORIDE, PRESERVATIVE FREE 10 ML: 5 INJECTION INTRAVENOUS at 00:56

## 2022-01-01 RX ADMIN — BUDESONIDE 500 MCG: 0.5 INHALANT ORAL at 09:13

## 2022-01-01 RX ADMIN — CALCIUM GLUCONATE 4 G: 98 INJECTION, SOLUTION INTRAVENOUS at 03:00

## 2022-01-01 RX ADMIN — Medication 55 MCG/MIN: at 21:51

## 2022-01-01 RX ADMIN — Medication 40 MCG/MIN: at 21:32

## 2022-01-01 RX ADMIN — ALBUMIN (HUMAN) 50 G: 0.25 INJECTION, SOLUTION INTRAVENOUS at 03:37

## 2022-01-01 RX ADMIN — VERAPAMIL HYDROCHLORIDE 80 MG: 80 TABLET ORAL at 22:35

## 2022-01-01 RX ADMIN — Medication 100 MCG/MIN: at 00:48

## 2022-01-01 RX ADMIN — Medication 25 MCG/MIN: at 10:58

## 2022-01-01 RX ADMIN — MONTELUKAST 10 MG: 10 TABLET, FILM COATED ORAL at 08:48

## 2022-01-01 RX ADMIN — ACETAMINOPHEN 650 MG: 325 TABLET, FILM COATED ORAL at 02:39

## 2022-01-01 RX ADMIN — DEXMEDETOMIDINE HYDROCHLORIDE 0.4 MCG/KG/HR: 4 INJECTION, SOLUTION INTRAVENOUS at 15:19

## 2022-01-01 RX ADMIN — VANCOMYCIN HYDROCHLORIDE 1000 MG: 1 INJECTION, POWDER, LYOPHILIZED, FOR SOLUTION INTRAVENOUS at 11:15

## 2022-01-01 RX ADMIN — DEXMEDETOMIDINE HYDROCHLORIDE 0.5 MCG/KG/HR: 4 INJECTION, SOLUTION INTRAVENOUS at 03:43

## 2022-01-01 RX ADMIN — SODIUM CHLORIDE, PRESERVATIVE FREE 10 ML: 5 INJECTION INTRAVENOUS at 19:59

## 2022-01-01 RX ADMIN — Medication 100 MCG/HR: at 01:15

## 2022-01-01 RX ADMIN — METHYLPREDNISOLONE SODIUM SUCCINATE 40 MG: 40 INJECTION, POWDER, FOR SOLUTION INTRAMUSCULAR; INTRAVENOUS at 05:43

## 2022-01-01 RX ADMIN — PIPERACILLIN AND TAZOBACTAM 4.5 G: 4; .5 INJECTION, POWDER, FOR SOLUTION INTRAVENOUS at 11:11

## 2022-01-01 RX ADMIN — SODIUM CHLORIDE 500 ML: 9 INJECTION, SOLUTION INTRAVENOUS at 00:01

## 2022-01-01 RX ADMIN — MIDAZOLAM 2 MG: 1 INJECTION INTRAMUSCULAR; INTRAVENOUS at 21:03

## 2022-01-01 RX ADMIN — Medication 150 MCG/HR: at 03:43

## 2022-01-01 RX ADMIN — HYDROCORTISONE SODIUM SUCCINATE 100 MG: 100 INJECTION, POWDER, FOR SOLUTION INTRAMUSCULAR; INTRAVENOUS at 05:25

## 2022-01-01 RX ADMIN — ONDANSETRON 4 MG: 4 TABLET, ORALLY DISINTEGRATING ORAL at 08:48

## 2022-01-01 RX ADMIN — PIPERACILLIN AND TAZOBACTAM 3.38 G: 3; .375 INJECTION, POWDER, FOR SOLUTION INTRAVENOUS at 22:51

## 2022-01-01 RX ADMIN — METHYLPREDNISOLONE SODIUM SUCCINATE 40 MG: 40 INJECTION, POWDER, FOR SOLUTION INTRAMUSCULAR; INTRAVENOUS at 22:35

## 2022-01-01 RX ADMIN — SODIUM CHLORIDE 500 ML: 9 INJECTION, SOLUTION INTRAVENOUS at 22:50

## 2022-01-01 RX ADMIN — SODIUM CHLORIDE, PRESERVATIVE FREE 10 ML: 5 INJECTION INTRAVENOUS at 15:23

## 2022-01-01 RX ADMIN — HYDROXYCHLOROQUINE SULFATE 200 MG: 200 TABLET, FILM COATED ORAL at 08:48

## 2022-01-01 RX ADMIN — Medication 40 MCG/MIN: at 21:15

## 2022-01-01 RX ADMIN — Medication 50 MCG/HR: at 21:38

## 2022-01-01 RX ADMIN — DEXMEDETOMIDINE HYDROCHLORIDE 0.3 MCG/KG/HR: 4 INJECTION, SOLUTION INTRAVENOUS at 00:03

## 2022-01-01 RX ADMIN — Medication 30 MCG/MIN: at 17:14

## 2022-01-01 RX ADMIN — DIGOXIN 250 MCG: 0.25 INJECTION INTRAMUSCULAR; INTRAVENOUS at 22:02

## 2022-01-01 RX ADMIN — SODIUM CHLORIDE 500 ML: 9 INJECTION, SOLUTION INTRAVENOUS at 15:22

## 2022-01-01 RX ADMIN — DILTIAZEM HYDROCHLORIDE 240 MG: 120 CAPSULE, COATED, EXTENDED RELEASE ORAL at 23:29

## 2022-01-01 RX ADMIN — EPINEPHRINE 2 MCG/MIN: 1 INJECTION INTRAMUSCULAR; INTRAVENOUS; SUBCUTANEOUS at 06:31

## 2022-01-01 RX ADMIN — DILTIAZEM HYDROCHLORIDE 15 MG/HR: 5 INJECTION, SOLUTION INTRAVENOUS at 22:41

## 2022-01-01 RX ADMIN — SODIUM POLYSTYRENE SULFONATE 30 G: 15 SUSPENSION ORAL; RECTAL at 08:47

## 2022-01-01 RX ADMIN — Medication 30 MCG/MIN: at 23:58

## 2022-01-01 RX ADMIN — SODIUM BICARBONATE: 84 INJECTION, SOLUTION INTRAVENOUS at 17:00

## 2022-01-01 RX ADMIN — VASOPRESSIN 0.03 UNITS/MIN: 20 INJECTION PARENTERAL at 17:00

## 2022-01-01 RX ADMIN — DULOXETINE HYDROCHLORIDE 60 MG: 30 CAPSULE, DELAYED RELEASE ORAL at 08:48

## 2022-01-01 RX ADMIN — Medication 100 MCG/MIN: at 06:51

## 2022-01-01 RX ADMIN — LEVOTHYROXINE SODIUM 75 MCG: 0.03 TABLET ORAL at 08:48

## 2022-01-01 RX ADMIN — METOPROLOL TARTRATE 50 MG: 50 TABLET, FILM COATED ORAL at 22:35

## 2022-01-01 RX ADMIN — SODIUM BICARBONATE: 84 INJECTION, SOLUTION INTRAVENOUS at 04:25

## 2022-01-01 RX ADMIN — SODIUM CHLORIDE 100 ML/HR: 9 INJECTION, SOLUTION INTRAVENOUS at 22:35

## 2022-01-01 RX ADMIN — ONDANSETRON 4 MG: 2 INJECTION INTRAMUSCULAR; INTRAVENOUS at 16:01

## 2022-01-01 RX ADMIN — FENTANYL CITRATE 25 MCG: 50 INJECTION, SOLUTION INTRAMUSCULAR; INTRAVENOUS at 19:58

## 2022-01-01 RX ADMIN — FENTANYL CITRATE 25 MCG: 50 INJECTION, SOLUTION INTRAMUSCULAR; INTRAVENOUS at 13:58

## 2022-08-11 ENCOUNTER — HOSPITAL ENCOUNTER (EMERGENCY)
Age: 72
Discharge: HOME | End: 2022-08-11
Attending: EMERGENCY MEDICINE
Payer: MEDICARE

## 2022-08-11 VITALS
DIASTOLIC BLOOD PRESSURE: 82 MMHG | OXYGEN SATURATION: 99 % | HEART RATE: 92 BPM | SYSTOLIC BLOOD PRESSURE: 126 MMHG | TEMPERATURE: 98.1 F | RESPIRATION RATE: 19 BRPM | HEIGHT: 61 IN | BODY MASS INDEX: 23.75 KG/M2 | WEIGHT: 125.8 LBS

## 2022-08-11 DIAGNOSIS — I47.1 SVT (SUPRAVENTRICULAR TACHYCARDIA) (HCC): Primary | ICD-10-CM

## 2022-08-11 LAB
ALBUMIN SERPL-MCNC: 3.7 G/DL (ref 3.5–5)
ALBUMIN/GLOB SERPL: 1.1 {RATIO} (ref 1.1–2.2)
ALP SERPL-CCNC: 56 U/L (ref 45–117)
ALT SERPL-CCNC: 21 U/L (ref 12–78)
ANION GAP SERPL CALC-SCNC: 4 MMOL/L (ref 5–15)
AST SERPL W P-5'-P-CCNC: 27 U/L (ref 15–37)
ATRIAL RATE: 106 BPM
ATRIAL RATE: 267 BPM
BASOPHILS # BLD: 0 K/UL (ref 0–0.1)
BASOPHILS NFR BLD: 0 % (ref 0–1)
BILIRUB SERPL-MCNC: 0.3 MG/DL (ref 0.2–1)
BUN SERPL-MCNC: 11 MG/DL (ref 6–20)
BUN/CREAT SERPL: 13 (ref 12–20)
CA-I BLD-MCNC: 9.1 MG/DL (ref 8.5–10.1)
CALCULATED P AXIS, ECG09: 81 DEGREES
CALCULATED R AXIS, ECG10: -116 DEGREES
CALCULATED R AXIS, ECG10: 75 DEGREES
CALCULATED T AXIS, ECG11: -3 DEGREES
CALCULATED T AXIS, ECG11: 58 DEGREES
CHLORIDE SERPL-SCNC: 101 MMOL/L (ref 97–108)
CO2 SERPL-SCNC: 32 MMOL/L (ref 21–32)
CREAT SERPL-MCNC: 0.88 MG/DL (ref 0.55–1.02)
DIAGNOSIS, 93000: NORMAL
DIAGNOSIS, 93000: NORMAL
DIFFERENTIAL METHOD BLD: ABNORMAL
EOSINOPHIL # BLD: 0 K/UL (ref 0–0.4)
EOSINOPHIL NFR BLD: 0 % (ref 0–7)
ERYTHROCYTE [DISTWIDTH] IN BLOOD BY AUTOMATED COUNT: 13.2 % (ref 11.5–14.5)
GLOBULIN SER CALC-MCNC: 3.3 G/DL (ref 2–4)
GLUCOSE SERPL-MCNC: 116 MG/DL (ref 65–100)
HCT VFR BLD AUTO: 32.1 % (ref 35–47)
HGB BLD-MCNC: 10.5 G/DL (ref 11.5–16)
IMM GRANULOCYTES # BLD AUTO: 0 K/UL (ref 0–0.04)
IMM GRANULOCYTES NFR BLD AUTO: 0 % (ref 0–0.5)
LYMPHOCYTES # BLD: 1.4 K/UL (ref 0.8–3.5)
LYMPHOCYTES NFR BLD: 21 % (ref 12–49)
MCH RBC QN AUTO: 30.6 PG (ref 26–34)
MCHC RBC AUTO-ENTMCNC: 32.7 G/DL (ref 30–36.5)
MCV RBC AUTO: 93.6 FL (ref 80–99)
MONOCYTES # BLD: 0.7 K/UL (ref 0–1)
MONOCYTES NFR BLD: 11 % (ref 5–13)
NEUTS SEG # BLD: 4.5 K/UL (ref 1.8–8)
NEUTS SEG NFR BLD: 68 % (ref 32–75)
NRBC # BLD: 0 K/UL (ref 0–0.01)
NRBC BLD-RTO: 0 PER 100 WBC
P-R INTERVAL, ECG05: 194 MS
PLATELET # BLD AUTO: 258 K/UL (ref 150–400)
PMV BLD AUTO: 9.5 FL (ref 8.9–12.9)
POTASSIUM SERPL-SCNC: 3.8 MMOL/L (ref 3.5–5.1)
PROT SERPL-MCNC: 7 G/DL (ref 6.4–8.2)
Q-T INTERVAL, ECG07: 280 MS
Q-T INTERVAL, ECG07: 352 MS
QRS DURATION, ECG06: 84 MS
QRS DURATION, ECG06: 84 MS
QTC CALCULATION (BEZET), ECG08: 467 MS
QTC CALCULATION (BEZET), ECG08: 490 MS
RBC # BLD AUTO: 3.43 M/UL (ref 3.8–5.2)
SODIUM SERPL-SCNC: 137 MMOL/L (ref 136–145)
VENTRICULAR RATE, ECG03: 106 BPM
VENTRICULAR RATE, ECG03: 184 BPM
WBC # BLD AUTO: 6.7 K/UL (ref 3.6–11)

## 2022-08-11 PROCEDURE — 74011250637 HC RX REV CODE- 250/637: Performed by: EMERGENCY MEDICINE

## 2022-08-11 PROCEDURE — 36415 COLL VENOUS BLD VENIPUNCTURE: CPT

## 2022-08-11 PROCEDURE — 85025 COMPLETE CBC W/AUTO DIFF WBC: CPT

## 2022-08-11 PROCEDURE — 99284 EMERGENCY DEPT VISIT MOD MDM: CPT

## 2022-08-11 PROCEDURE — 80053 COMPREHEN METABOLIC PANEL: CPT

## 2022-08-11 PROCEDURE — 74011250636 HC RX REV CODE- 250/636: Performed by: EMERGENCY MEDICINE

## 2022-08-11 PROCEDURE — 96360 HYDRATION IV INFUSION INIT: CPT

## 2022-08-11 PROCEDURE — 93005 ELECTROCARDIOGRAM TRACING: CPT

## 2022-08-11 RX ORDER — DILTIAZEM HYDROCHLORIDE 120 MG/1
240 CAPSULE, COATED, EXTENDED RELEASE ORAL DAILY
Status: DISCONTINUED | OUTPATIENT
Start: 2022-08-11 | End: 2022-08-11 | Stop reason: HOSPADM

## 2022-08-11 RX ORDER — DILTIAZEM HYDROCHLORIDE 5 MG/ML
20 INJECTION INTRAVENOUS
Status: DISCONTINUED | OUTPATIENT
Start: 2022-08-11 | End: 2022-08-11

## 2022-08-11 RX ADMIN — SODIUM CHLORIDE 1000 ML: 9 INJECTION, SOLUTION INTRAVENOUS at 09:15

## 2022-08-11 RX ADMIN — DILTIAZEM HYDROCHLORIDE 240 MG: 120 CAPSULE, COATED, EXTENDED RELEASE ORAL at 10:17

## 2022-08-11 NOTE — DISCHARGE INSTRUCTIONS
Thank you! Thank you for allowing me to care for you in the emergency department. It is my goal to provide you with excellent care. If you have not received excellent quality care, please ask to speak to the nurse manager. Please fill out the survey that will come to you by mail or email since we listen to your feedback! Below you will find a list of your tests from today's visit. Should you have any questions, please do not hesitate to call the emergency department. Labs  Recent Results (from the past 12 hour(s))   EKG, 12 LEAD, INITIAL    Collection Time: 08/11/22  8:52 AM   Result Value Ref Range    Ventricular Rate 184 BPM    Atrial Rate 267 BPM    QRS Duration 84 ms    Q-T Interval 280 ms    QTC Calculation (Bezet) 490 ms    Calculated R Axis -116 degrees    Calculated T Axis -3 degrees    Diagnosis       Supraventricular tachycardia  Right superior axis deviation  Right ventricular hypertrophy  ST depression, consider subendocardial injury  Abnormal QRS-T angle, consider primary T wave abnormality  Abnormal ECG  No previous ECGs available  Confirmed by TRINITY JOHNSTON, Javad White ([id number]) on 8/11/2022 11:32:12 AM     CBC WITH AUTOMATED DIFF    Collection Time: 08/11/22  9:00 AM   Result Value Ref Range    WBC 6.7 3.6 - 11.0 K/uL    RBC 3.43 (L) 3.80 - 5.20 M/uL    HGB 10.5 (L) 11.5 - 16.0 g/dL    HCT 32.1 (L) 35.0 - 47.0 %    MCV 93.6 80.0 - 99.0 FL    MCH 30.6 26.0 - 34.0 PG    MCHC 32.7 30.0 - 36.5 g/dL    RDW 13.2 11.5 - 14.5 %    PLATELET 566 396 - 471 K/uL    MPV 9.5 8.9 - 12.9 FL    NRBC 0.0 0.0  WBC    ABSOLUTE NRBC 0.00 0.00 - 0.01 K/uL    NEUTROPHILS 68 32 - 75 %    LYMPHOCYTES 21 12 - 49 %    MONOCYTES 11 5 - 13 %    EOSINOPHILS 0 0 - 7 %    BASOPHILS 0 0 - 1 %    IMMATURE GRANULOCYTES 0 0 - 0.5 %    ABS. NEUTROPHILS 4.5 1.8 - 8.0 K/UL    ABS. LYMPHOCYTES 1.4 0.8 - 3.5 K/UL    ABS. MONOCYTES 0.7 0.0 - 1.0 K/UL    ABS. EOSINOPHILS 0.0 0.0 - 0.4 K/UL    ABS. BASOPHILS 0.0 0.0 - 0.1 K/UL    ABS. IMM. [place] Memo. 0.0 0.00 - 0.04 K/UL    DF AUTOMATED     METABOLIC PANEL, COMPREHENSIVE    Collection Time: 08/11/22  9:00 AM   Result Value Ref Range    Sodium 137 136 - 145 mmol/L    Potassium 3.8 3.5 - 5.1 mmol/L    Chloride 101 97 - 108 mmol/L    CO2 32 21 - 32 mmol/L    Anion gap 4 (L) 5 - 15 mmol/L    Glucose 116 (H) 65 - 100 mg/dL    BUN 11 6 - 20 mg/dL    Creatinine 0.88 0.55 - 1.02 mg/dL    BUN/Creatinine ratio 13 12 - 20      GFR est AA >60 >60 ml/min/1.73m2    GFR est non-AA >60 >60 ml/min/1.73m2    Calcium 9.1 8.5 - 10.1 mg/dL    Bilirubin, total 0.3 0.2 - 1.0 mg/dL    AST (SGOT) 27 15 - 37 U/L    ALT (SGPT) 21 12 - 78 U/L    Alk. phosphatase 56 45 - 117 U/L    Protein, total 7.0 6.4 - 8.2 g/dL    Albumin 3.7 3.5 - 5.0 g/dL    Globulin 3.3 2.0 - 4.0 g/dL    A-G Ratio 1.1 1.1 - 2.2     EKG, 12 LEAD, INITIAL    Collection Time: 08/11/22  9:10 AM   Result Value Ref Range    Ventricular Rate 106 BPM    Atrial Rate 106 BPM    P-R Interval 194 ms    QRS Duration 84 ms    Q-T Interval 352 ms    QTC Calculation (Bezet) 467 ms    Calculated P Axis 81 degrees    Calculated R Axis 75 degrees    Calculated T Axis 58 degrees    Diagnosis       Sinus tachycardia with Premature atrial complexes  Possible Left atrial enlargement  Nonspecific ST abnormality  Abnormal ECG    Confirmed by TRINITY JOHNSTON, Yanelis Piña ([id number]) on 8/11/2022 11:32:31 AM         Radiologic Studies  No orders to display     CT Results  (Last 48 hours)      None          CXR Results  (Last 48 hours)      None          ------------------------------------------------------------------------------------------------------------  The exam and treatment you received in the Emergency Department were for an urgent problem and are not intended as complete care. It is important that you follow-up with a doctor, nurse practitioner, or physician assistant to:  (1) confirm your diagnosis,  (2) re-evaluation of changes in your illness and treatment, and  (3) for ongoing care. Please take your discharge instructions with you when you go to your follow-up appointment. If you have any problem arranging a follow-up appointment, contact the Emergency Department. If your symptoms become worse or you do not improve as expected and you are unable to reach your health care provider, please return to the Emergency Department. We are available 24 hours a day. If a prescription has been provided, please have it filled as soon as possible to prevent a delay in treatment. If you have any questions or reservations about taking the medication due to side effects or interactions with other medications, please call your primary care provider or contact the ER.

## 2022-08-11 NOTE — ED TRIAGE NOTES
Pt arrives by EMS from home. Pt woke up at 4am with palpitations. Pt took her meds, symptoms didn't improve. EMS found Pt to be in SVT, converted once by self and then went back to SVT rate of 190s. EMS gave 6mg adenosine and no improvement.  Hx of COPD and SVT

## 2022-08-11 NOTE — ED PROVIDER NOTES
EMERGENCY DEPARTMENT HISTORY AND PHYSICAL EXAM      Date: 8/11/2022  Patient Name: Jere Dyer    History of Presenting Illness     Chief Complaint   Patient presents with    Palpitations       History Provided By: Patient    HPI: Jere Dyer, 70 y.o. female with a significant past medical history of copd, on 3L oxygen, svt, hypothyroidism, htn presents to the ED with palpitations. This started around 4 AM.  No exacerbating or alleviating factors. Reports shortness of breath at baseline. She has been compliant with her symptoms. Deneis any recent illness, no fever, cough, sob, vomtiing, diarrhea. There are no other complaints, changes, or physical findings at this time. PCP: Marcio Carlson, DO    No current facility-administered medications on file prior to encounter. Current Outpatient Medications on File Prior to Encounter   Medication Sig Dispense Refill    gabapentin (NEURONTIN) 600 mg tablet Take 600 mg by mouth three (3) times daily. hydrOXYzine HCL (ATARAX) 25 mg tablet Take 25 mg by mouth three (3) times daily as needed. pantoprazole (PROTONIX) 40 mg tablet Take 40 mg by mouth daily. pravastatin (PRAVACHOL) 40 mg tablet Take 40 mg by mouth daily. 0    nortriptyline (PAMELOR) 10 mg capsule Take 10 mg by mouth nightly. montelukast (SINGULAIR) 10 mg tablet Take 10 mg by mouth daily. acetaminophen (TYLENOL EXTRA STRENGTH) 500 mg tablet Take 500 mg by mouth every six (6) hours as needed. levothyroxine (SYNTHROID) 50 mcg tablet Take 50 mcg by mouth Daily (before breakfast). dilTIAZem ER (CARDIZEM CD) 120 mg capsule Take 120 mg by mouth daily. citalopram (CELEXA) 20 mg tablet Take 20 mg by mouth daily. fluticasone-salmeterol (ADVAIR DISKUS) 250-50 mcg/Dose diskus inhaler Take 1 Puff by inhalation every twelve (12) hours. IPRATROPIUM/ALBUTEROL SULFATE (COMBIVENT IN) Take 14.7 g by inhalation four (4) times daily.       calcium-vitamin D (OYSTER SHELL) 500 mg(1,250mg) -200 unit per tablet Take 1 Tab by mouth two (2) times daily (with meals). Past History     Past Medical History:  Past Medical History:   Diagnosis Date    Anxiety     Arrhythmia     svt    Calculus of kidney     Chronic atrial fibrillation ([address]     COPD     emphysema    Depression     GERD (gastroesophageal reflux disease)     Hypothyroidism     Sciatica     Thyroid disease        Past Surgical History:  Past Surgical History:   Procedure Laterality Date    BREAST SURGERY PROCEDURE UNLISTED      biopsy     HX BREAST BIOPSY Right 2010    HX GYN      LEEP  for CIN2    HX ORTHOPAEDIC      left leg       Family History:  Family History   Problem Relation Age of Onset    Malignant Hyperthermia Neg Hx     Pseudocholinesterase Deficiency Neg Hx     Delayed Awakening Neg Hx     Post-op Nausea/Vomiting Neg Hx     Emergence Delirium Neg Hx     Post-op Cognitive Dysfunction Neg Hx     Other Neg Hx        Social History:  Social History     Tobacco Use    Smoking status: Former     Types: Cigarettes     Quit date:      Years since quittin.6    Smokeless tobacco: Never   Substance Use Topics    Alcohol use: No    Drug use: No       Allergies: Allergies   Allergen Reactions    Nsaids (Non-Steroidal Anti-Inflammatory Drug) Unknown (comments)    Sulfa (Sulfonamide Antibiotics) Rash       Review of Systems   Review of Systems   Constitutional:  Negative for chills and fever. HENT:  Negative for sore throat. Eyes:  Negative for redness. Respiratory:  Negative for shortness of breath. Cardiovascular:  Positive for palpitations. Negative for chest pain. Gastrointestinal:  Negative for abdominal pain, nausea and vomiting. Genitourinary:  Negative for flank pain. Musculoskeletal:  Negative for myalgias. Skin:  Negative for rash. Neurological:  Negative for headaches. Physical Exam   Physical Exam  Vitals and nursing note reviewed.    Constitutional: General: She is not in acute distress. Appearance: Normal appearance. HENT:      Head: Normocephalic and atraumatic. Mouth/Throat:      Mouth: Mucous membranes are moist.   Eyes:      Extraocular Movements: Extraocular movements intact. Conjunctiva/sclera: Conjunctivae normal.   Cardiovascular:      Rate and Rhythm: Regular rhythm. Tachycardia present. Pulmonary:      Effort: Pulmonary effort is normal. No respiratory distress. Breath sounds: Normal breath sounds. No wheezing, rhonchi or rales. Abdominal:      General: There is no distension. Palpations: Abdomen is soft. Tenderness: There is no abdominal tenderness. Musculoskeletal:         General: Normal range of motion. Cervical back: Normal range of motion. Skin:     General: Skin is warm and dry. Neurological:      General: No focal deficit present. Mental Status: She is alert and oriented to person, place, and time. Mental status is at baseline. Lab and Diagnostic Study Results   Labs -   No results found for this or any previous visit (from the past 12 hour(s)). Radiologic Studies -   @lastxrresult@  CT Results  (Last 48 hours)      None          CXR Results  (Last 48 hours)      None            Medical Decision Making and ED Course   - I am the first provider for this patient. I reviewed the vital signs, available nursing notes, past medical history, past surgical history, family history and social history. - Initial assessment performed. The patients presenting problems have been discussed, and they are in agreement with the care plan formulated and outlined with them. I have encouraged them to ask questions as they arise throughout their visit. Vital Signs-Reviewed the patient's vital signs.   Patient Vitals for the past 12 hrs:   Pulse Resp BP SpO2   08/11/22 0854 (!) 183 28 114/83 98 %       Differential Diagnosis & Medical Decision Making Provider Note:   19-year-old female with history of SVT here with recurrent SVT. Patient cardioverted to NSR without intervention therefore IV diltiazem was deferred and she was given an additional dose of oral diltiazem ER. No ischemic changes on repeat EKG. No electrolyte abnormalities. Patient is well-appearing and comfortable. Stable for discharge to follow-up with PCP as needed    ED Course:   ED Course as of 08/11/22 1602   Thu Aug 11, 2022   6791 SVT, rate 94, right axis deviation, no ST elevation. ST depressions in the precordial leads. [KK]   0929 Probable sinus tach rate 106, axis, normal depression, no T wave versions, QTc 467 [KK]   0929 Patient reverted so NSR with rate 106 without intervention. Will give oral long acting diltiazem instead of IV bolus [KK]      ED Course User Index  [KK] Yasmine Lara MD         Disposition   Disposition: DC- Adult Discharges: All of the diagnostic tests were reviewed and questions answered. Diagnosis, care plan and treatment options were discussed. The patient understands the instructions and will follow up as directed. The patients results have been reviewed with them. They have been counseled regarding their diagnosis. The patient verbally convey understanding and agreement of the signs, symptoms, diagnosis, treatment and prognosis and additionally agrees to follow up as recommended with their PCP in 24 - 48 hours. They also agree with the care-plan and convey that all of their questions have been answered. I have also put together some discharge instructions for them that include: 1) educational information regarding their diagnosis, 2) how to care for their diagnosis at home, as well a 3) list of reasons why they would want to return to the ED prior to their follow-up appointment, should their condition change. DISCHARGE PLAN:  1.    Current Discharge Medication List        CONTINUE these medications which have NOT CHANGED    Details   gabapentin (NEURONTIN) 600 mg tablet Take 600 mg by mouth three (3) times daily. hydrOXYzine HCL (ATARAX) 25 mg tablet Take 25 mg by mouth three (3) times daily as needed. pantoprazole (PROTONIX) 40 mg tablet Take 40 mg by mouth daily. pravastatin (PRAVACHOL) 40 mg tablet Take 40 mg by mouth daily. Refills: 0    Associated Diagnoses: Paresthesia      nortriptyline (PAMELOR) 10 mg capsule Take 10 mg by mouth nightly. montelukast (SINGULAIR) 10 mg tablet Take 10 mg by mouth daily. acetaminophen (TYLENOL EXTRA STRENGTH) 500 mg tablet Take 500 mg by mouth every six (6) hours as needed. levothyroxine (SYNTHROID) 50 mcg tablet Take 50 mcg by mouth Daily (before breakfast). dilTIAZem ER (CARDIZEM CD) 120 mg capsule Take 120 mg by mouth daily. citalopram (CELEXA) 20 mg tablet Take 20 mg by mouth daily. fluticasone-salmeterol (ADVAIR DISKUS) 250-50 mcg/Dose diskus inhaler Take 1 Puff by inhalation every twelve (12) hours. IPRATROPIUM/ALBUTEROL SULFATE (COMBIVENT IN) Take 14.7 g by inhalation four (4) times daily. calcium-vitamin D (OYSTER SHELL) 500 mg(1,250mg) -200 unit per tablet Take 1 Tab by mouth two (2) times daily (with meals). 2.   Follow-up Information    None       3. Return to ED if worse   4. Current Discharge Medication List          Diagnosis/Clinical Impression     Clinical Impression:   1. SVT (supraventricular tachycardia) ([facility])        Attestations: Devin GREENFIELD MD, am the primary clinician of record. Please note that this dictation was completed with Scrip-t, the [employer] voice recognition software. Quite often unanticipated grammatical, syntax, homophones, and other interpretive errors are inadvertently transcribed by the computer software. Please disregard these errors. Please excuse any errors that have escaped final proofreading. Thank you.

## 2022-11-22 ENCOUNTER — HOSPITAL ENCOUNTER (INPATIENT)
Age: 72
LOS: 8 days | Discharge: HOME | DRG: 308 | End: 2022-11-30
Attending: STUDENT IN AN ORGANIZED HEALTH CARE EDUCATION/TRAINING PROGRAM | Admitting: FAMILY MEDICINE
Payer: MEDICARE

## 2022-11-22 ENCOUNTER — APPOINTMENT (OUTPATIENT)
Dept: GENERAL RADIOLOGY | Age: 72
DRG: 308 | End: 2022-11-22
Attending: STUDENT IN AN ORGANIZED HEALTH CARE EDUCATION/TRAINING PROGRAM
Payer: MEDICARE

## 2022-11-22 DIAGNOSIS — I48.91 ATRIAL FIBRILLATION WITH RVR (HCC): Primary | ICD-10-CM

## 2022-11-22 DIAGNOSIS — J44.9 CHRONIC OBSTRUCTIVE PULMONARY DISEASE, UNSPECIFIED COPD TYPE (HCC): ICD-10-CM

## 2022-11-22 LAB
ALBUMIN SERPL-MCNC: 3.7 G/DL (ref 3.5–5)
ALBUMIN/GLOB SERPL: 1.2 {RATIO} (ref 1.1–2.2)
ALP SERPL-CCNC: 66 U/L (ref 45–117)
ALT SERPL-CCNC: 19 U/L (ref 12–78)
ANION GAP SERPL CALC-SCNC: 7 MMOL/L (ref 5–15)
APPEARANCE UR: CLEAR
AST SERPL W P-5'-P-CCNC: 23 U/L (ref 15–37)
BACTERIA URNS QL MICRO: NEGATIVE /HPF
BACTERIA URNS QL MICRO: NEGATIVE /HPF
BASOPHILS # BLD: 0.1 K/UL (ref 0–0.1)
BASOPHILS NFR BLD: 1 % (ref 0–1)
BILIRUB SERPL-MCNC: 0.4 MG/DL (ref 0.2–1)
BILIRUB UR QL: NEGATIVE
BNP SERPL-MCNC: 1300 PG/ML
BUN SERPL-MCNC: 9 MG/DL (ref 6–20)
BUN/CREAT SERPL: 13 (ref 12–20)
CA-I BLD-MCNC: 8.9 MG/DL (ref 8.5–10.1)
CHLORIDE SERPL-SCNC: 100 MMOL/L (ref 97–108)
CO2 SERPL-SCNC: 30 MMOL/L (ref 21–32)
COLOR UR: ABNORMAL
CREAT SERPL-MCNC: 0.72 MG/DL (ref 0.55–1.02)
DIFFERENTIAL METHOD BLD: ABNORMAL
EOSINOPHIL # BLD: 0.1 K/UL (ref 0–0.4)
EOSINOPHIL NFR BLD: 1 % (ref 0–7)
ERYTHROCYTE [DISTWIDTH] IN BLOOD BY AUTOMATED COUNT: 13.7 % (ref 11.5–14.5)
GLOBULIN SER CALC-MCNC: 3 G/DL (ref 2–4)
GLUCOSE SERPL-MCNC: 92 MG/DL (ref 65–100)
GLUCOSE UR STRIP.AUTO-MCNC: NEGATIVE MG/DL
HCT VFR BLD AUTO: 29 % (ref 35–47)
HGB BLD-MCNC: 9.1 G/DL (ref 11.5–16)
HGB UR QL STRIP: NEGATIVE
IMM GRANULOCYTES # BLD AUTO: 0 K/UL (ref 0–0.04)
IMM GRANULOCYTES NFR BLD AUTO: 0 % (ref 0–0.5)
KETONES UR QL STRIP.AUTO: NEGATIVE MG/DL
LEUKOCYTE ESTERASE UR QL STRIP.AUTO: ABNORMAL
LYMPHOCYTES # BLD: 1.3 K/UL (ref 0.8–3.5)
LYMPHOCYTES NFR BLD: 14 % (ref 12–49)
MCH RBC QN AUTO: 29.6 PG (ref 26–34)
MCHC RBC AUTO-ENTMCNC: 31.4 G/DL (ref 30–36.5)
MCV RBC AUTO: 94.5 FL (ref 80–99)
MONOCYTES # BLD: 1 K/UL (ref 0–1)
MONOCYTES NFR BLD: 11 % (ref 5–13)
NEUTS SEG # BLD: 6.8 K/UL (ref 1.8–8)
NEUTS SEG NFR BLD: 73 % (ref 32–75)
NITRITE UR QL STRIP.AUTO: NEGATIVE
NRBC # BLD: 0 K/UL (ref 0–0.01)
NRBC BLD-RTO: 0 PER 100 WBC
PH UR STRIP: 7 [PH] (ref 5–8)
PLATELET # BLD AUTO: 337 K/UL (ref 150–400)
PMV BLD AUTO: 9.6 FL (ref 8.9–12.9)
POTASSIUM SERPL-SCNC: 4.8 MMOL/L (ref 3.5–5.1)
PROT SERPL-MCNC: 6.7 G/DL (ref 6.4–8.2)
PROT UR STRIP-MCNC: NEGATIVE MG/DL
RBC # BLD AUTO: 3.07 M/UL (ref 3.8–5.2)
RBC #/AREA URNS HPF: ABNORMAL /HPF (ref 0–5)
RBC #/AREA URNS HPF: NORMAL /HPF (ref 0–5)
SODIUM SERPL-SCNC: 137 MMOL/L (ref 136–145)
SP GR UR REFRACTOMETRY: 1 (ref 1–1.03)
TROPONIN-HIGH SENSITIVITY: 49 NG/L (ref 0–51)
UROBILINOGEN UR QL STRIP.AUTO: 0.1 EU/DL (ref 0.1–1)
WBC # BLD AUTO: 9.3 K/UL (ref 3.6–11)
WBC URNS QL MICRO: ABNORMAL /HPF (ref 0–4)
WBC URNS QL MICRO: NORMAL /HPF (ref 0–4)

## 2022-11-22 PROCEDURE — 74011000250 HC RX REV CODE- 250: Performed by: FAMILY MEDICINE

## 2022-11-22 PROCEDURE — 94640 AIRWAY INHALATION TREATMENT: CPT

## 2022-11-22 PROCEDURE — 81001 URINALYSIS AUTO W/SCOPE: CPT

## 2022-11-22 PROCEDURE — 65610000006 HC RM INTENSIVE CARE

## 2022-11-22 PROCEDURE — 77010033678 HC OXYGEN DAILY

## 2022-11-22 PROCEDURE — 93005 ELECTROCARDIOGRAM TRACING: CPT

## 2022-11-22 PROCEDURE — 74011250636 HC RX REV CODE- 250/636: Performed by: STUDENT IN AN ORGANIZED HEALTH CARE EDUCATION/TRAINING PROGRAM

## 2022-11-22 PROCEDURE — 74011250636 HC RX REV CODE- 250/636: Performed by: FAMILY MEDICINE

## 2022-11-22 PROCEDURE — 83880 ASSAY OF NATRIURETIC PEPTIDE: CPT

## 2022-11-22 PROCEDURE — 99285 EMERGENCY DEPT VISIT HI MDM: CPT

## 2022-11-22 PROCEDURE — 74011000250 HC RX REV CODE- 250: Performed by: STUDENT IN AN ORGANIZED HEALTH CARE EDUCATION/TRAINING PROGRAM

## 2022-11-22 PROCEDURE — 80053 COMPREHEN METABOLIC PANEL: CPT

## 2022-11-22 PROCEDURE — 74011000258 HC RX REV CODE- 258: Performed by: STUDENT IN AN ORGANIZED HEALTH CARE EDUCATION/TRAINING PROGRAM

## 2022-11-22 PROCEDURE — 74011250637 HC RX REV CODE- 250/637: Performed by: FAMILY MEDICINE

## 2022-11-22 PROCEDURE — 96375 TX/PRO/DX INJ NEW DRUG ADDON: CPT

## 2022-11-22 PROCEDURE — 84484 ASSAY OF TROPONIN QUANT: CPT

## 2022-11-22 PROCEDURE — 85025 COMPLETE CBC W/AUTO DIFF WBC: CPT

## 2022-11-22 PROCEDURE — 36415 COLL VENOUS BLD VENIPUNCTURE: CPT

## 2022-11-22 PROCEDURE — 71045 X-RAY EXAM CHEST 1 VIEW: CPT

## 2022-11-22 PROCEDURE — 96374 THER/PROPH/DIAG INJ IV PUSH: CPT

## 2022-11-22 RX ORDER — HYDROXYZINE HYDROCHLORIDE 10 MG/1
25 TABLET, FILM COATED ORAL
Status: DISCONTINUED | OUTPATIENT
Start: 2022-11-22 | End: 2022-11-30 | Stop reason: HOSPADM

## 2022-11-22 RX ORDER — ACETAMINOPHEN 650 MG/1
650 SUPPOSITORY RECTAL
Status: DISCONTINUED | OUTPATIENT
Start: 2022-11-22 | End: 2022-11-30 | Stop reason: HOSPADM

## 2022-11-22 RX ORDER — PANTOPRAZOLE SODIUM 40 MG/1
40 TABLET, DELAYED RELEASE ORAL DAILY
Status: DISCONTINUED | OUTPATIENT
Start: 2022-11-23 | End: 2022-11-30 | Stop reason: HOSPADM

## 2022-11-22 RX ORDER — IPRATROPIUM BROMIDE AND ALBUTEROL SULFATE 2.5; .5 MG/3ML; MG/3ML
3 SOLUTION RESPIRATORY (INHALATION)
Status: DISCONTINUED | OUTPATIENT
Start: 2022-11-22 | End: 2022-11-26

## 2022-11-22 RX ORDER — ONDANSETRON 2 MG/ML
4 INJECTION INTRAMUSCULAR; INTRAVENOUS
Status: DISCONTINUED | OUTPATIENT
Start: 2022-11-22 | End: 2022-11-30 | Stop reason: HOSPADM

## 2022-11-22 RX ORDER — DULOXETIN HYDROCHLORIDE 30 MG/1
60 CAPSULE, DELAYED RELEASE ORAL DAILY
Status: DISCONTINUED | OUTPATIENT
Start: 2022-11-23 | End: 2022-11-30 | Stop reason: HOSPADM

## 2022-11-22 RX ORDER — MAGNESIUM SULFATE 100 %
4 CRYSTALS MISCELLANEOUS AS NEEDED
Status: DISCONTINUED | OUTPATIENT
Start: 2022-11-22 | End: 2022-11-30 | Stop reason: HOSPADM

## 2022-11-22 RX ORDER — MONTELUKAST SODIUM 10 MG/1
10 TABLET ORAL DAILY
Status: DISCONTINUED | OUTPATIENT
Start: 2022-11-23 | End: 2022-11-30 | Stop reason: HOSPADM

## 2022-11-22 RX ORDER — NORTRIPTYLINE HYDROCHLORIDE 10 MG/1
10 CAPSULE ORAL
Status: DISCONTINUED | OUTPATIENT
Start: 2022-11-22 | End: 2022-11-30 | Stop reason: HOSPADM

## 2022-11-22 RX ORDER — ACETAMINOPHEN 325 MG/1
650 TABLET ORAL
Status: COMPLETED | OUTPATIENT
Start: 2022-11-22 | End: 2022-11-22

## 2022-11-22 RX ORDER — ACETAMINOPHEN 325 MG/1
650 TABLET ORAL
Status: DISCONTINUED | OUTPATIENT
Start: 2022-11-22 | End: 2022-11-30 | Stop reason: HOSPADM

## 2022-11-22 RX ORDER — BUDESONIDE AND FORMOTEROL FUMARATE DIHYDRATE 160; 4.5 UG/1; UG/1
2 AEROSOL RESPIRATORY (INHALATION) 2 TIMES DAILY
Status: DISCONTINUED | OUTPATIENT
Start: 2022-11-22 | End: 2022-11-30 | Stop reason: HOSPADM

## 2022-11-22 RX ORDER — ALBUTEROL SULFATE 2.5 MG/.5ML
2.5 SOLUTION RESPIRATORY (INHALATION)
Status: DISCONTINUED | OUTPATIENT
Start: 2022-11-22 | End: 2022-11-22

## 2022-11-22 RX ORDER — POLYETHYLENE GLYCOL 3350 17 G/17G
17 POWDER, FOR SOLUTION ORAL DAILY PRN
Status: DISCONTINUED | OUTPATIENT
Start: 2022-11-22 | End: 2022-11-30 | Stop reason: HOSPADM

## 2022-11-22 RX ORDER — FUROSEMIDE 40 MG/1
40 TABLET ORAL ONCE
Status: COMPLETED | OUTPATIENT
Start: 2022-11-22 | End: 2022-11-22

## 2022-11-22 RX ORDER — DILTIAZEM HYDROCHLORIDE 5 MG/ML
10 INJECTION INTRAVENOUS ONCE
Status: COMPLETED | OUTPATIENT
Start: 2022-11-22 | End: 2022-11-22

## 2022-11-22 RX ORDER — GABAPENTIN 400 MG/1
400 CAPSULE ORAL 2 TIMES DAILY
Status: DISCONTINUED | OUTPATIENT
Start: 2022-11-22 | End: 2022-11-30 | Stop reason: HOSPADM

## 2022-11-22 RX ORDER — LEVOTHYROXINE SODIUM 100 UG/1
50 TABLET ORAL
Status: DISCONTINUED | OUTPATIENT
Start: 2022-11-23 | End: 2022-11-22

## 2022-11-22 RX ORDER — DILTIAZEM HYDROCHLORIDE 5 MG/ML
10 INJECTION INTRAVENOUS
Status: COMPLETED | OUTPATIENT
Start: 2022-11-22 | End: 2022-11-22

## 2022-11-22 RX ORDER — ONDANSETRON 4 MG/1
4 TABLET, ORALLY DISINTEGRATING ORAL
Status: DISCONTINUED | OUTPATIENT
Start: 2022-11-22 | End: 2022-11-30 | Stop reason: HOSPADM

## 2022-11-22 RX ORDER — ONDANSETRON 2 MG/ML
4 INJECTION INTRAMUSCULAR; INTRAVENOUS
Status: COMPLETED | OUTPATIENT
Start: 2022-11-22 | End: 2022-11-22

## 2022-11-22 RX ORDER — HYDROXYCHLOROQUINE SULFATE 200 MG/1
200 TABLET, FILM COATED ORAL
Status: DISCONTINUED | OUTPATIENT
Start: 2022-11-23 | End: 2022-11-30 | Stop reason: HOSPADM

## 2022-11-22 RX ORDER — LEVOTHYROXINE SODIUM 75 UG/1
75 TABLET ORAL
Status: DISCONTINUED | OUTPATIENT
Start: 2022-11-23 | End: 2022-11-30 | Stop reason: HOSPADM

## 2022-11-22 RX ORDER — PRAVASTATIN SODIUM 40 MG/1
40 TABLET ORAL DAILY
Status: DISCONTINUED | OUTPATIENT
Start: 2022-11-23 | End: 2022-11-30 | Stop reason: HOSPADM

## 2022-11-22 RX ADMIN — DILTIAZEM HYDROCHLORIDE 10 MG: 5 INJECTION INTRAVENOUS at 15:04

## 2022-11-22 RX ADMIN — GABAPENTIN 400 MG: 400 CAPSULE ORAL at 20:38

## 2022-11-22 RX ADMIN — SODIUM CHLORIDE 1000 ML: 9 INJECTION, SOLUTION INTRAVENOUS at 14:25

## 2022-11-22 RX ADMIN — FUROSEMIDE 40 MG: 40 TABLET ORAL at 18:20

## 2022-11-22 RX ADMIN — DILTIAZEM HYDROCHLORIDE 10 MG: 5 INJECTION INTRAVENOUS at 15:32

## 2022-11-22 RX ADMIN — ACETAMINOPHEN 650 MG: 325 TABLET ORAL at 17:23

## 2022-11-22 RX ADMIN — BUDESONIDE AND FORMOTEROL FUMARATE DIHYDRATE 2 PUFF: 160; 4.5 AEROSOL RESPIRATORY (INHALATION) at 19:52

## 2022-11-22 RX ADMIN — ONDANSETRON 4 MG: 2 INJECTION INTRAMUSCULAR; INTRAVENOUS at 15:33

## 2022-11-22 RX ADMIN — METHYLPREDNISOLONE SODIUM SUCCINATE 125 MG: 125 INJECTION, POWDER, FOR SOLUTION INTRAMUSCULAR; INTRAVENOUS at 16:27

## 2022-11-22 RX ADMIN — IPRATROPIUM BROMIDE AND ALBUTEROL SULFATE 3 ML: .5; 2.5 SOLUTION RESPIRATORY (INHALATION) at 19:39

## 2022-11-22 RX ADMIN — DILTIAZEM HYDROCHLORIDE 2.5 MG/HR: 5 INJECTION, SOLUTION INTRAVENOUS at 17:28

## 2022-11-22 RX ADMIN — METHYLPREDNISOLONE SODIUM SUCCINATE 40 MG: 40 INJECTION, POWDER, FOR SOLUTION INTRAMUSCULAR; INTRAVENOUS at 18:19

## 2022-11-22 NOTE — ED TRIAGE NOTES
Sob times two weeks but is worsen, chronically on o2 at 3L has hx of copd, SVT. Pain to back of neck.

## 2022-11-22 NOTE — PROGRESS NOTES
Reason for Admission:  Afib                     RUR Score:   N/A                  Plan for utilizing home health:  No HH @ this time/uses no DME on ambulation/home O2  via [facility]. PCP: First and Last name:  Linda Tiwari DO     Name of Practice:    Are you a current patient: Yes/No: Yes   Approximate date of last visit: Seen a month ago. Can you participate in a virtual visit with your PCP: Yes/Call                    Current Advanced Directive/Advance Care Plan: No Order      Healthcare Decision Maker:   Daughter ( Chad Saunders @ [phone]). Transition of Care Plan:                    D/C Plan is home alone & daughter Zach Watson) to transport. Send Rxs to [facility] in [place] [id number], [facility] upon discharge.

## 2022-11-22 NOTE — ED NOTES
TRANSFER - OUT REPORT:    Verbal report given to brain (name) on Arlette Anaya  being transferred to CVICU (unit) for routine progression of care       Report consisted of patients Situation, Background, Assessment and   Recommendations(SBAR). Information from the following report(s) SBAR, Kardex, ED Summary, and MAR was reviewed with the receiving nurse. Lines:   Peripheral IV 11/22/22 Right Antecubital (Active)       Peripheral IV 11/22/22 Left Forearm (Active)   Site Assessment Clean, dry, & intact 11/22/22 1426   Phlebitis Assessment 0 11/22/22 1426   Infiltration Assessment 0 11/22/22 1426   Dressing Status Clean, dry, & intact 11/22/22 1426   Hub Color/Line Status Pink 11/22/22 1426        Opportunity for questions and clarification was provided.       Patient transported with:   Registered Nurse

## 2022-11-22 NOTE — PROGRESS NOTES
Received patient from ED. Patients -150's on cardizem gtt, all other VSS. No complaints of pain, patient oriented to room and placed on monitor. Admission skin assessment completed by this RN and Davide Dumont RN. No skin issues to note at this time. Cardizem increased from 2.5 to 5 per titration order set.

## 2022-11-22 NOTE — ED PROVIDER NOTES
[address]  EMERGENCY DEPARTMENT ENCOUNTER NOTE        Date: 11/22/2022  Patient Name: Otto Puri      History of Presenting Illness     Chief Complaint   Patient presents with    Shortness of Breath    Palpitations    Neck Pain       History Provided By: Patient    HPI: Otto Puri, 70 y.o. female with PMH of GERD, hypothyroidism, lupus, COPD, and atrial fibrillation who presents to the ED with chief complaint of chest pain, shortness of breath, neck pain and palpitation. Patient reports the symptoms started approximately 3 days ago in which has been having chest pain, retrosternal and left-sided, sharp in nature, last few seconds and resolves, nothing triggers it, makes it better or worse associated with shortness of breath. Patient reports that she has history of shortness of breath due to COPD and on 3 L of oxygen. This seems to have worsened today from baseline. She has history of chronic cough and that has not changed her baseline. No fever, chills or sweats. There are no other complaints, changes, or physical findings at this time.     PCP: tSephanie All, DO    Current Facility-Administered Medications   Medication Dose Route Frequency Provider Last Rate Last Admin    dilTIAZem (CARDIZEM) 125 mg in 0.9% sodium chloride 125 mL ([id number])  0-15 mg/hr IntraVENous TITRATE Asim Flores MD 5 mL/hr at 11/22/22 1815 5 mg/hr at 11/22/22 1815    budesonide-formoteroL (SYMBICORT) 160-4.5 mcg/actuation HFA inhaler 2 Puff  2 Puff Inhalation BID Tino Flores Res, MD        hydrOXYzine HCL (ATARAX) tablet 25 mg  25 mg Oral TID PRN MD Fidel Santillan ON 11/23/2022] montelukast (SINGULAIR) tablet 10 mg  10 mg Oral DAILY Asim Flores MD        [facility] [place] by provider] nortriptyline (PAMELOR) capsule 10 mg  10 mg Oral QHS Asim Flores MD Rogers Stall ON 11/23/2022] pantoprazole (PROTONIX) tablet 40 mg  40 mg Oral DAILY Mili Pizano MD [START ON 11/23/2022] pravastatin (PRAVACHOL) tablet 40 mg  40 mg Oral DAILY Asim Flores MD        glucose chewable tablet 16 g  4 Tablet Oral PRN Radha Flores MD        glucagon (GLUCAGEN) injection 1 mg  1 mg IntraMUSCular PRN Radha Flores MD        acetaminophen (TYLENOL) tablet 650 mg  650 mg Oral Q6H PRN Radha Flores MD        Or    acetaminophen (TYLENOL) suppository 650 mg  650 mg Rectal Q6H PRN Radha Flores MD        polyethylene glycol (MIRALAX) packet 17 g  17 g Oral DAILY PRN Radha Flores MD        ondansetron (ZOFRAN ODT) tablet 4 mg  4 mg Oral Q8H PRN Radha Flores MD        Or    ondansetron [facility]) injection 4 mg  4 mg IntraVENous Q6H PRN Radha Flores MD        [facility] ON 11/23/2022] levothyroxine (SYNTHROID) tablet 75 mcg  75 mcg Oral ACB Radha Flores MD        [facility] ON 11/23/2022] DULoxetine (CYMBALTA) capsule 60 mg  60 mg Oral DAILY Asim Flores MD        gabapentin (NEURONTIN) capsule 400 mg  400 mg Oral BID Lysbeth Angelucci, MD        [facility] ON 11/23/2022] hydrOXYchloroQUINE (PLAQUENIL) tablet 200 mg  200 mg Oral DAILY WITH BREAKFAST Lysbeth Angelucci, MD        albuterol-ipratropium (DUO-NEB) 2.5 MG-0.5 MG/3 ML  3 mL Nebulization Q6H RT Lysbeth Angelucci, MD        methylPREDNISolone (PF) (SOLU-MEDROL) injection 40 mg  40 mg IntraVENous Q6H Lysbeth Angelucci, MD   40 mg at 11/22/22 1819       Past History     Past Medical History:  Past Medical History:   Diagnosis Date    Anxiety     Arrhythmia     svt    Calculus of kidney     Chronic atrial fibrillation ([facility] [address]     COPD     emphysema    Depression     GERD (gastroesophageal reflux disease)     Hypothyroidism     Sciatica     Thyroid disease        Past Surgical History:  Past Surgical History:   Procedure Laterality Date    BREAST SURGERY PROCEDURE UNLISTED  2007    biopsy     HX BREAST BIOPSY Right 2010    HX GYN  11/05    LEEP  for CIN2    HX ORTHOPAEDIC      left leg       Family History:  Family History   Problem Relation Age of Onset    Malignant Hyperthermia Neg Hx     Pseudocholinesterase Deficiency Neg Hx     Delayed Awakening Neg Hx     Post-op Nausea/Vomiting Neg Hx     Emergence Delirium Neg Hx     Post-op Cognitive Dysfunction Neg Hx     Other Neg Hx        Social History:  Social History     Tobacco Use    Smoking status: Former     Types: Cigarettes     Quit date:      Years since quittin.9    Smokeless tobacco: Never   Substance Use Topics    Alcohol use: No    Drug use: No       Allergies: Allergies   Allergen Reactions    Nsaids (Non-Steroidal Anti-Inflammatory Drug) Unknown (comments)    Sulfa (Sulfonamide Antibiotics) Rash         Review of Systems     Review of Systems    A 10 point review of system was performed and was negative except as noted above in HPI    Physical Exam     Physical Exam  Vitals and nursing note reviewed. Constitutional:       General: She is not in acute distress. Appearance: She is well-developed. She is not diaphoretic. HENT:      Head: Normocephalic and atraumatic. Eyes:      Extraocular Movements: Extraocular movements intact. Conjunctiva/sclera: Conjunctivae normal.   Cardiovascular:      Rate and Rhythm: Tachycardia present. Rhythm irregular. Heart sounds: Normal heart sounds. Pulmonary:      Effort: Pulmonary effort is normal.      Breath sounds: Wheezing present. Abdominal:      Palpations: Abdomen is soft. Tenderness: There is no abdominal tenderness. Musculoskeletal:      Cervical back: Neck supple. Right lower leg: No tenderness. No edema. Left lower leg: No tenderness. No edema. Neurological:      General: No focal deficit present. Mental Status: She is alert and oriented to person, place, and time.        Lab and Diagnostic Study Results     Labs -     Recent Results (from the past 12 hour(s))   CBC WITH AUTOMATED DIFF    Collection Time: 22  2:32 PM   Result Value Ref Range WBC 9.3 3.6 - 11.0 K/uL    RBC 3.07 (L) 3.80 - 5.20 M/uL    HGB 9.1 (L) 11.5 - 16.0 g/dL    HCT 29.0 (L) 35.0 - 47.0 %    MCV 94.5 80.0 - 99.0 FL    MCH 29.6 26.0 - 34.0 PG    MCHC 31.4 30.0 - 36.5 g/dL    RDW 13.7 11.5 - 14.5 %    PLATELET 541 346 - 831 K/uL    MPV 9.6 8.9 - 12.9 FL    NRBC 0.0 0.0  WBC    ABSOLUTE NRBC 0.00 0.00 - 0.01 K/uL    NEUTROPHILS 73 32 - 75 %    LYMPHOCYTES 14 12 - 49 %    MONOCYTES 11 5 - 13 %    EOSINOPHILS 1 0 - 7 %    BASOPHILS 1 0 - 1 %    IMMATURE GRANULOCYTES 0 0 - 0.5 %    ABS. NEUTROPHILS 6.8 1.8 - 8.0 K/UL    ABS. LYMPHOCYTES 1.3 0.8 - 3.5 K/UL    ABS. MONOCYTES 1.0 0.0 - 1.0 K/UL    ABS. EOSINOPHILS 0.1 0.0 - 0.4 K/UL    ABS. BASOPHILS 0.1 0.0 - 0.1 K/UL    ABS. IMM. GRANS. 0.0 0.00 - 0.04 K/UL    DF AUTOMATED     METABOLIC PANEL, COMPREHENSIVE    Collection Time: 11/22/22  2:32 PM   Result Value Ref Range    Sodium 137 136 - 145 mmol/L    Potassium 4.8 3.5 - 5.1 mmol/L    Chloride 100 97 - 108 mmol/L    CO2 30 21 - 32 mmol/L    Anion gap 7 5 - 15 mmol/L    Glucose 92 65 - 100 mg/dL    BUN 9 6 - 20 mg/dL    Creatinine 0.72 0.55 - 1.02 mg/dL    BUN/Creatinine ratio 13 12 - 20      eGFR >60 >60 ml/min/1.73m2    Calcium 8.9 8.5 - 10.1 mg/dL    Bilirubin, total 0.4 0.2 - 1.0 mg/dL    AST (SGOT) 23 15 - 37 U/L    ALT (SGPT) 19 12 - 78 U/L    Alk.  phosphatase 66 45 - 117 U/L    Protein, total 6.7 6.4 - 8.2 g/dL    Albumin 3.7 3.5 - 5.0 g/dL    Globulin 3.0 2.0 - 4.0 g/dL    A-G Ratio 1.2 1.1 - 2.2     TROPONIN-HIGH SENSITIVITY    Collection Time: 11/22/22  2:32 PM   Result Value Ref Range    Troponin-High Sensitivity 49 0 - 51 ng/L   NT-PRO BNP    Collection Time: 11/22/22  2:32 PM   Result Value Ref Range    NT pro-BNP 1,300 (H) <125 pg/mL   URINALYSIS W/ RFLX MICROSCOPIC    Collection Time: 11/22/22  4:12 PM   Result Value Ref Range    Color Yellow/Straw      Appearance Clear Clear      Specific gravity 1.005 1.003 - 1.030      pH (UA) 7.0 5.0 - 8.0      Protein Negative Negative mg/dL    Glucose Negative Negative mg/dL    Ketone Negative Negative mg/dL    Bilirubin Negative Negative      Blood Negative Negative      Urobilinogen 0.1 0.1 - 1.0 EU/dL    Nitrites Negative Negative      Leukocyte Esterase Moderate (A) Negative      WBC 20-50 0 - 4 /hpf    RBC 0-5 0 - 5 /hpf    Bacteria Negative Negative /hpf   URINE MICROSCOPIC    Collection Time: 11/22/22  4:12 PM   Result Value Ref Range    WBC 20-50 0 - 4 /hpf    RBC 0-5 0 - 5 /hpf    Bacteria Negative Negative /hpf       Radiologic Studies -   [unfilled]  CT Results  (Last 48 hours)      None          CXR Results  (Last 48 hours)                 11/22/22 1457  XR CHEST PORT Final result    Impression:      No acute process on portable chest.           Narrative:  EXAM:  XR CHEST PORT       INDICATION: Shortness of breath       COMPARISON: none       TECHNIQUE: portable chest AP view       FINDINGS: The cardiac silhouette is within normal limits. Emphysematous changes   are noted. Central pulmonary arteries are enlarged. There is no focal airspace disease. The visualized bones and upper abdomen are   age-appropriate. Medical Decision Making and ED Course   - I am the first and primary provider for this patient AND AM THE PRIMARY PROVIDER OF RECORD. - I reviewed the vital signs, available nursing notes, past medical history, past surgical history, family history and social history. - Initial assessment performed. The patients presenting problems have been discussed, and the staff are in agreement with the care plan formulated and outlined with them. I have encouraged them to ask questions as they arise throughout their visit. Vital Signs-Reviewed the patient's vital signs.     Patient Vitals for the past 24 hrs:   Temp Pulse Resp BP SpO2   11/22/22 1832 -- (!) 128 28 124/85 97 %   11/22/22 1812 -- (!) 145 29 123/66 97 %   11/22/22 1739 -- (!) 124 18 (!) 131/99 99 %   11/22/22 1602 -- (!) 112 19 -- 99 %   11/22/22 1542 -- 98 19 107/65 100 %   11/22/22 1532 -- (!) 139 -- 124/87 --   11/22/22 1525 -- (!) 129 16 -- --   11/22/22 1504 -- (!) 144 -- (!) 124/96 --   11/22/22 1402 98.4 °F (36.9 °C) (!) 152 22 121/69 98 %       Records Reviewed: Nursing Notes and Old Medical Records    Provider Notes (Medical Decision Making):     68-year-old female presents to the ED with shortness of breath, chest pain, palpitation.  - Chest pain: Patient report that her pain is sharp and short-lived. Atypical.  EKG was obtained which showed atrial fibrillation without significant signs of ST elevation. EKG interpretation. EKG done at 2:02 PM interpreted by me as atrial fibrillation rate of 152, indeterminate OH, QRS is 78, QTc is 477, right axis deviation, no specific ST elevation or depression meeting criteria, no signs of blocks. Significant baseline motion artifact. Chest x-ray is unremarkable and troponin is unremarkable. - Palpitation: Patient has history of chronic defibrillation. She has been on rate control with diltiazem. She comes to the ED in A. fib with RVR. She got 2 doses of diltiazem which did not break her completely out of RVR. She initially dropped down to the upper 90s and went back to RVR. Was started on diltiazem drip. - Shortness of breath: Patient did have wheezing on examination. Has history of COPD on oxygen. She did receive 1 bronchodilators with modest improvement followed by administration of albuterol x2. Also gave methylprednisone. Patient will be admitted to the ICU for treatment of atrial fibrillation given that she is on diltiazem drip as well as treatment of COPD. Procedures and Critical Care       Performed by: Yoselin Hart MD  PROCEDURES:      Critical Care  Performed by: David Peterson MD  Authorized by:  David Peterson MD     Critical care provider statement:     Critical care time (minutes):  37    Critical care time was exclusive of:  Separately billable procedures and treating other patients    Critical care was necessary to treat or prevent imminent or life-threatening deterioration of the following conditions: COPD, a fib w/ rvr. Critical care was time spent personally by me on the following activities:  Development of treatment plan with patient or surrogate, discussions with consultants, evaluation of patient's response to treatment, examination of patient, obtaining history from patient or surrogate, ordering and performing treatments and interventions, ordering and review of laboratory studies, ordering and review of radiographic studies, pulse oximetry, re-evaluation of patient's condition, review of old charts and ventilator management    I assumed direction of critical care for this patient from another provider in my specialty: no      Care discussed with: admitting provider         Diagnosis     Clinical Impression:   1. Atrial fibrillation with RVR ([facility] [address]    2. [facility], [facility]        Disposition     Disposition: Condition improved    Admitted      Attestations: Andie Figueroa MD    Please note that this dictation was completed with "[employer] the computer voice recognition software. Quite often unanticipated grammatical, syntax, homophones, and other interpretive errors are inadvertently transcribed by the computer software. Please disregard these errors. Please excuse any errors that have escaped final proofreading. Thank you.

## 2022-11-22 NOTE — H&P
History and Physical    NAME: Cole Luo   :  1950   MRN:  [id number]     Date/Time:  2022 5:59 PM    Patient PCP: Alka Chaudhari, DO  ______________________________________________________________________             Subjective:     CHIEF COMPLAINT:     Palpitation        HISTORY OF PRESENT ILLNESS:       Patient is a 70y.o. year old female with signal past medical history of chronic A. fib not on anticoagulation secondary to bleed COPD hypothyroidism hyperlipidemia chronic pain neuropathy came to emergency room  Complaining of shortness of breath for last 2 weeks and worsening patient normally at 3 L oxygen by nasal cannula for COPD at home Emergency room seen by the ER physician work-up and showed patient on A. fib with rapid ventricular rate patient received 10 mg of IV Cardizem twice but patient still in rapid ventricular rate patient on Cardizem drip cardiology was consulted by the ER physician cardiology recommended patient to be admitted for further evaluation and treatment patient denies any fever chills has not shortness of breath no nausea no vomiting  In the ER blood work done BNP of 1300  Past Medical History:   Diagnosis Date    Anxiety     Arrhythmia     svt    Calculus of kidney     Chronic atrial fibrillation ([address]     COPD     emphysema    Depression     GERD (gastroesophageal reflux disease)     Hypothyroidism     Sciatica     Thyroid disease         Past Surgical History:   Procedure Laterality Date    BREAST SURGERY PROCEDURE UNLISTED      biopsy     HX BREAST BIOPSY Right     HX GYN      LEEP  for CIN2    HX ORTHOPAEDIC      left leg       Social History     Tobacco Use    Smoking status: Former     Types: Cigarettes     Quit date:      Years since quittin.9    Smokeless tobacco: Never   Substance Use Topics    Alcohol use: No        Family History   Problem Relation Age of Onset    Malignant Hyperthermia Neg Hx     Pseudocholinesterase Deficiency Neg Hx     Delayed Awakening Neg Hx     Post-op Nausea/Vomiting Neg Hx     Emergence Delirium Neg Hx     Post-op Cognitive Dysfunction Neg Hx     Other Neg Hx        Allergies   Allergen Reactions    Nsaids (Non-Steroidal Anti-Inflammatory Drug) Unknown (comments)    Sulfa (Sulfonamide Antibiotics) Rash        Prior to Admission medications    Medication Sig Start Date End Date Taking? Authorizing Provider   gabapentin (NEURONTIN) 600 mg tablet Take 600 mg by mouth three (3) times daily. Provider, Historical   hydrOXYzine HCL (ATARAX) 25 mg tablet Take 25 mg by mouth three (3) times daily as needed. Provider, Historical   pantoprazole (PROTONIX) 40 mg tablet Take 40 mg by mouth daily. Provider, Historical   pravastatin (PRAVACHOL) 40 mg tablet Take 40 mg by mouth daily. 7/2/18   Provider, Historical   nortriptyline (PAMELOR) 10 mg capsule Take 10 mg by mouth nightly. Provider, Historical   montelukast (SINGULAIR) 10 mg tablet Take 10 mg by mouth daily. Provider, Historical   acetaminophen (TYLENOL EXTRA STRENGTH) 500 mg tablet Take 500 mg by mouth every six (6) hours as needed. Provider, Historical   levothyroxine (SYNTHROID) 50 mcg tablet Take 50 mcg by mouth Daily (before breakfast). Provider, Historical   dilTIAZem ER (CARDIZEM CD) 120 mg capsule Take 120 mg by mouth daily. Provider, Historical   citalopram (CELEXA) 20 mg tablet Take 20 mg by mouth daily. Provider, Historical   fluticasone-salmeterol (ADVAIR DISKUS) 250-50 mcg/Dose diskus inhaler Take 1 Puff by inhalation every twelve (12) hours. Provider, Historical   IPRATROPIUM/ALBUTEROL SULFATE (COMBIVENT IN) Take 14.7 g by inhalation four (4) times daily. 10/22/10   Provider, Historical   calcium-vitamin D (OYSTER SHELL) 500 mg(1,250mg) -200 unit per tablet Take 1 Tab by mouth two (2) times daily (with meals).     Provider, Historical         Current Facility-Administered Medications:     dilTIAZem (CARDIZEM) 125 mg in 0.9% sodium chloride 125 mL ([id number]), 0-15 mg/hr, IntraVENous, TITRATE, Asim Flores MD, Last Rate: 2.5 mL/hr at 11/22/22 1728, 2.5 mg/hr at 11/22/22 1728    . PHARMACY TO SUBSTITUTE PER PROTOCOL (Reordered from: fluticasone-salmeterol (ADVAIR DISKUS) 250-50 mcg/Dose diskus inhaler), , , Per Protocol, Ephraim Flores MD    .PHARMACY TO SUBSTITUTE PER PROTOCOL (Reordered from: gabapentin (NEURONTIN) 600 mg tablet), , , Per Protocol, Daphne Mckay MD    hydrOXYzine HCL (ATARAX) tablet 25 mg, 25 mg, Oral, TID PRN, Ephraim Flores MD    [facility] 11/23/2022] montelukast (SINGULAIR) tablet 10 mg, 10 mg, Oral, DAILY, Ephraim Flores MD    [facility] [place] by provider] nortriptyline (PAMELOR) capsule 10 mg, 10 mg, Oral, QHS, Asim Flores MD    [facility] ON 11/23/2022] pantoprazole (PROTONIX) tablet 40 mg, 40 mg, Oral, DAILY, Ephraim Flores MD    [facility] ON 11/23/2022] pravastatin (PRAVACHOL) tablet 40 mg, 40 mg, Oral, DAILY, Daphne Mckay MD    glucose chewable tablet 16 g, 4 Tablet, Oral, PRN, Ephraim Flores MD    glucagon (GLUCAGEN) injection 1 mg, 1 mg, IntraMUSCular, PRN, Ephraim Flores MD    acetaminophen (TYLENOL) tablet 650 mg, 650 mg, Oral, Q6H PRN **OR** acetaminophen (TYLENOL) suppository 650 mg, 650 mg, Rectal, Q6H PRN, Asim Flores MD    polyethylene glycol (MIRALAX) packet 17 g, 17 g, Oral, DAILY PRN, Ephraim Flores MD    ondansetron (ZOFRAN ODT) tablet 4 mg, 4 mg, Oral, Q8H PRN **OR** ondansetron (ZOFRAN) injection 4 mg, 4 mg, IntraVENous, Q6H PRN, Asim Flores MD [facility] ON 11/23/2022] levothyroxine (SYNTHROID) tablet 75 mcg, 75 mcg, Oral, ACB, Asim Flores MD [facility] ON 11/23/2022] DULoxetine (CYMBALTA) capsule 60 mg, 60 mg, Oral, DAILY, Asim Flores MD    gabapentin (NEURONTIN) capsule 400 mg, 400 mg, Oral, BID, Ephraim Flores MD    [facility] ON 11/23/2022] hydrOXYchloroQUINE (PLAQUENIL) tablet 200 mg, 200 mg, Oral, DAILY WITH BREAKFAST, Daphne Mckay MD albuterol-ipratropium (DUO-NEB) 2.5 MG-0.5 MG/3 ML, 3 mL, Nebulization, Q6H RT, Asim Flores MD    methylPREDNISolone (PF) (SOLU-MEDROL) injection 40 mg, 40 mg, IntraVENous, Q6H, Asim Flores MD    Current Outpatient Medications:     gabapentin (NEURONTIN) 600 mg tablet, Take 600 mg by mouth three (3) times daily. , Disp: , Rfl:     hydrOXYzine HCL (ATARAX) 25 mg tablet, Take 25 mg by mouth three (3) times daily as needed. , Disp: , Rfl:     pantoprazole (PROTONIX) 40 mg tablet, Take 40 mg by mouth daily. , Disp: , Rfl:     pravastatin (PRAVACHOL) 40 mg tablet, Take 40 mg by mouth daily. , Disp: , Rfl: 0    nortriptyline (PAMELOR) 10 mg capsule, Take 10 mg by mouth nightly., Disp: , Rfl:     montelukast (SINGULAIR) 10 mg tablet, Take 10 mg by mouth daily. , Disp: , Rfl:     acetaminophen (TYLENOL EXTRA STRENGTH) 500 mg tablet, Take 500 mg by mouth every six (6) hours as needed. , Disp: , Rfl:     levothyroxine (SYNTHROID) 50 mcg tablet, Take 50 mcg by mouth Daily (before breakfast). , Disp: , Rfl:     dilTIAZem ER (CARDIZEM CD) 120 mg capsule, Take 120 mg by mouth daily. , Disp: , Rfl:     citalopram (CELEXA) 20 mg tablet, Take 20 mg by mouth daily. , Disp: , Rfl:     fluticasone-salmeterol (ADVAIR DISKUS) 250-50 mcg/Dose diskus inhaler, Take 1 Puff by inhalation every twelve (12) hours. , Disp: , Rfl:     IPRATROPIUM/ALBUTEROL SULFATE (COMBIVENT IN), Take 14.7 g by inhalation four (4) times daily. , Disp: , Rfl:     calcium-vitamin D (OYSTER SHELL) 500 mg(1,250mg) -200 unit per tablet, Take 1 Tab by mouth two (2) times daily (with meals). , Disp: , Rfl:     LAB DATA REVIEWED:    Recent Results (from the past 24 hour(s))   CBC WITH AUTOMATED DIFF    Collection Time: 11/22/22  2:32 PM   Result Value Ref Range    WBC 9.3 3.6 - 11.0 K/uL    RBC 3.07 (L) 3.80 - 5.20 M/uL    HGB 9.1 (L) 11.5 - 16.0 g/dL    HCT 29.0 (L) 35.0 - 47.0 %    MCV 94.5 80.0 - 99.0 FL    MCH 29.6 26.0 - 34.0 PG    MCHC 31.4 30.0 - 36.5 g/dL RDW 13.7 11.5 - 14.5 %    PLATELET 447 673 - 706 K/uL    MPV 9.6 8.9 - 12.9 FL    NRBC 0.0 0.0  WBC    ABSOLUTE NRBC 0.00 0.00 - 0.01 K/uL    NEUTROPHILS 73 32 - 75 %    LYMPHOCYTES 14 12 - 49 %    MONOCYTES 11 5 - 13 %    EOSINOPHILS 1 0 - 7 %    BASOPHILS 1 0 - 1 %    IMMATURE GRANULOCYTES 0 0 - 0.5 %    ABS. NEUTROPHILS 6.8 1.8 - 8.0 K/UL    ABS. LYMPHOCYTES 1.3 0.8 - 3.5 K/UL    ABS. MONOCYTES 1.0 0.0 - 1.0 K/UL    ABS. EOSINOPHILS 0.1 0.0 - 0.4 K/UL    ABS. BASOPHILS 0.1 0.0 - 0.1 K/UL    ABS. IMM. GRANS. 0.0 0.00 - 0.04 K/UL    DF AUTOMATED     METABOLIC PANEL, COMPREHENSIVE    Collection Time: 11/22/22  2:32 PM   Result Value Ref Range    Sodium 137 136 - 145 mmol/L    Potassium 4.8 3.5 - 5.1 mmol/L    Chloride 100 97 - 108 mmol/L    CO2 30 21 - 32 mmol/L    Anion gap 7 5 - 15 mmol/L    Glucose 92 65 - 100 mg/dL    BUN 9 6 - 20 mg/dL    Creatinine 0.72 0.55 - 1.02 mg/dL    BUN/Creatinine ratio 13 12 - 20      eGFR >60 >60 ml/min/1.73m2    Calcium 8.9 8.5 - 10.1 mg/dL    Bilirubin, total 0.4 0.2 - 1.0 mg/dL    AST (SGOT) 23 15 - 37 U/L    ALT (SGPT) 19 12 - 78 U/L    Alk.  phosphatase 66 45 - 117 U/L    Protein, total 6.7 6.4 - 8.2 g/dL    Albumin 3.7 3.5 - 5.0 g/dL    Globulin 3.0 2.0 - 4.0 g/dL    A-G Ratio 1.2 1.1 - 2.2     TROPONIN-HIGH SENSITIVITY    Collection Time: 11/22/22  2:32 PM   Result Value Ref Range    Troponin-High Sensitivity 49 0 - 51 ng/L   NT-PRO BNP    Collection Time: 11/22/22  2:32 PM   Result Value Ref Range    NT pro-BNP 1,300 (H) <125 pg/mL   URINALYSIS W/ RFLX MICROSCOPIC    Collection Time: 11/22/22  4:12 PM   Result Value Ref Range    Color Yellow/Straw      Appearance Clear Clear      Specific gravity 1.005 1.003 - 1.030      pH (UA) 7.0 5.0 - 8.0      Protein Negative Negative mg/dL    Glucose Negative Negative mg/dL    Ketone Negative Negative mg/dL    Bilirubin Negative Negative      Blood Negative Negative      Urobilinogen 0.1 0.1 - 1.0 EU/dL    Nitrites Negative Negative      Leukocyte Esterase Moderate (A) Negative      WBC 20-50 0 - 4 /hpf    RBC 0-5 0 - 5 /hpf    Bacteria Negative Negative /hpf   URINE MICROSCOPIC    Collection Time: 11/22/22  4:12 PM   Result Value Ref Range    WBC 20-50 0 - 4 /hpf    RBC 0-5 0 - 5 /hpf    Bacteria Negative Negative /hpf       XR Results (most recent):  Results from Hospital Encounter encounter on 11/22/22    XR CHEST PORT    Narrative  EXAM:  XR CHEST PORT    INDICATION: Shortness of breath    COMPARISON: none    TECHNIQUE: portable chest AP view    FINDINGS: The cardiac silhouette is within normal limits. Emphysematous changes  are noted. Central pulmonary arteries are enlarged. There is no focal airspace disease. The visualized bones and upper abdomen are  age-appropriate. Impression  No acute process on portable chest.         XR CHEST PORT   Final Result      No acute process on portable chest.              Review of Systems:  Constitutional: Negative for chills and fever. HENT: Negative. Eyes: Negative. Respiratory: Negative. Cardiovascular: Negative. Gastrointestinal: Negative for abdominal pain and nausea. Skin: Negative. Neurological: Negative. Objective:   VITALS:    Visit Vitals  BP (!) 131/99   Pulse (!) 124   Temp 98.4 °F (36.9 °C)   Resp 18   Ht 5' 4\" (1.626 m)   Wt 57.6 kg (127 lb)   SpO2 99%   BMI 21.80 kg/m²       Physical Exam:   Constitutional: pt is oriented to person, place, and time. HENT:   Head: Normocephalic and atraumatic. Eyes: Pupils are equal, round, and reactive to light. EOM are normal.   Cardiovascular: Normal rate, regular rhythm and normal heart sounds. Pulmonary/Chest: Breath sounds normal. No wheezes. No rales. Exhibits no tenderness. Abdominal: Soft. Bowel sounds are normal. There is no abdominal tenderness. There is no rebound and no guarding. Musculoskeletal: Normal range of motion. Neurological: pt is alert and oriented to person, place, and time. Alert. Normal strength. No cranial nerve deficit or sensory deficit. Displays a negative Romberg sign. ASSESSMENT & PLAN:    A. fib with rapid ventricular rate  COPD  Chronic hypoxic respiratory failure on 3 L  Hypertension  Hypothyroidism  Depression  Neuropathy        Current Facility-Administered Medications:     dilTIAZem (CARDIZEM) 125 mg in 0.9% sodium chloride 125 mL ([id number]), 0-15 mg/hr, IntraVENous, TITRATE, Asim Flores MD, Last Rate: 2.5 mL/hr at 11/22/22 1728, 2.5 mg/hr at 11/22/22 1728    . PHARMACY TO SUBSTITUTE PER PROTOCOL (Reordered from: fluticasone-salmeterol (ADVAIR DISKUS) 250-50 mcg/Dose diskus inhaler), , , Per Protocol, Griselda Flores MD    .PHARMACY TO SUBSTITUTE PER PROTOCOL (Reordered from: gabapentin (NEURONTIN) 600 mg tablet), , , Per Protocol, Mikey Rey MD    hydrOXYzine HCL (ATARAX) tablet 25 mg, 25 mg, Oral, TID PRN, Griselda Flores MD [facility] ON 11/23/2022] montelukast (SINGULAIR) tablet 10 mg, 10 mg, Oral, DAILY, Griselda Flores MD    [facility] AT [place] by provider] nortriptyline (PAMELOR) capsule 10 mg, 10 mg, Oral, QHS, Asim Flores MD [facility] ON 11/23/2022] pantoprazole (PROTONIX) tablet 40 mg, 40 mg, Oral, DAILY, Griselda Flores MD [facility] ON 11/23/2022] pravastatin (PRAVACHOL) tablet 40 mg, 40 mg, Oral, DAILY, Mikey Rey MD    glucose chewable tablet 16 g, 4 Tablet, Oral, PRN, Griselda Flores MD    glucagon (GLUCAGEN) injection 1 mg, 1 mg, IntraMUSCular, PRN, Griselda Flores MD    acetaminophen (TYLENOL) tablet 650 mg, 650 mg, Oral, Q6H PRN **OR** acetaminophen (TYLENOL) suppository 650 mg, 650 mg, Rectal, Q6H PRN, Asim Flores MD    polyethylene glycol (MIRALAX) packet 17 g, 17 g, Oral, DAILY PRN, Asim Flores MD    ondansetron (ZOFRAN ODT) tablet 4 mg, 4 mg, Oral, Q8H PRN **OR** ondansetron (ZOFRAN) injection 4 mg, 4 mg, IntraVENous, Q6H PRN, Asim Flores MD    [START ON 11/23/2022] levothyroxine (SYNTHROID) tablet 75 mcg, 75 mcg, Oral, ACB, Maria Eugenia Flores MD [facility]yr ON 11/23/2022] DULoxetine (CYMBALTA) capsule 60 mg, 60 mg, Oral, DAILY, Maria Eugenia Flores MD    gabapentin (NEURONTIN) capsule 400 mg, 400 mg, Oral, BID, Maria Eugenia Flores MD [facility] Martyr ON 11/23/2022] hydrOXYchloroQUINE (PLAQUENIL) tablet 200 mg, 200 mg, Oral, DAILY WITH BREAKFAST, Asim Flores MD    albuterol-ipratropium (DUO-NEB) 2.5 MG-0.5 MG/3 ML, 3 mL, Nebulization, Q6H RT, Asim Flores MD    methylPREDNISolone (PF) (SOLU-MEDROL) injection 40 mg, 40 mg, IntraVENous, Q6H, Asim Flores MD    Current Outpatient Medications:     gabapentin (NEURONTIN) 600 mg tablet, Take 600 mg by mouth three (3) times daily. , Disp: , Rfl:     hydrOXYzine HCL (ATARAX) 25 mg tablet, Take 25 mg by mouth three (3) times daily as needed. , Disp: , Rfl:     pantoprazole (PROTONIX) 40 mg tablet, Take 40 mg by mouth daily. , Disp: , Rfl:     pravastatin (PRAVACHOL) 40 mg tablet, Take 40 mg by mouth daily. , Disp: , Rfl: 0    nortriptyline (PAMELOR) 10 mg capsule, Take 10 mg by mouth nightly., Disp: , Rfl:     montelukast (SINGULAIR) 10 mg tablet, Take 10 mg by mouth daily. , Disp: , Rfl:     acetaminophen (TYLENOL EXTRA STRENGTH) 500 mg tablet, Take 500 mg by mouth every six (6) hours as needed. , Disp: , Rfl:     levothyroxine (SYNTHROID) 50 mcg tablet, Take 50 mcg by mouth Daily (before breakfast). , Disp: , Rfl:     dilTIAZem ER (CARDIZEM CD) 120 mg capsule, Take 120 mg by mouth daily. , Disp: , Rfl:     citalopram (CELEXA) 20 mg tablet, Take 20 mg by mouth daily. , Disp: , Rfl:     fluticasone-salmeterol (ADVAIR DISKUS) 250-50 mcg/Dose diskus inhaler, Take 1 Puff by inhalation every twelve (12) hours. , Disp: , Rfl:     IPRATROPIUM/ALBUTEROL SULFATE (COMBIVENT IN), Take 14.7 g by inhalation four (4) times daily. , Disp: , Rfl:     calcium-vitamin D (OYSTER SHELL) 500 mg(1,250mg) -200 unit per tablet, Take 1 Tab by mouth two (2) times daily (with meals). , Disp: , Rfl:      Patient admitted to ICU started Cardizem drip cardiology consult pulmonary consult  Patient unable to start on anticoagulation due to history of GI bleed  Nebulizer treatment every 6 hours  Cymbalta 60 mg daily  Neurontin 400 mg twice a day  Added chloroquine 200 mg daily  Synthroid 75 mcg daily  IV Solu-Medrol 40 IV every 6  Singular 10 mg daily  Protonix 40 daily  Pravastatin 40 daily      Lasix 40 mg IV 1 dose    Cardiology pulmonary consult  2D echo ordered      ________________________________________________________________________    Signed: Isis Pruitt MD

## 2022-11-22 NOTE — Clinical Note
Status[de-identified] INPATIENT [101]   Type of Bed: Telemetry [19]   Cardiac Monitoring Required?: No   Inpatient Hospitalization Certified Necessary for the Following Reasons: 3. Patient receiving treatment that can only be provided in an inpatient setting (further clarification in H&P documentation)   Admitting Diagnosis: Atrial fibrillation ([facility] [address] [427.31. ICD-9-CM]   Admitting Physician: Jonathan Gill [[id number]]   Attending Physician: Jonathan Gill [[id number]]   Estimated Length of Stay: 2 Midnights   Discharge Plan[de-identified] Home with Office Follow-up

## 2022-11-23 ENCOUNTER — APPOINTMENT (OUTPATIENT)
Dept: NON INVASIVE DIAGNOSTICS | Age: 72
DRG: 308 | End: 2022-11-23
Attending: FAMILY MEDICINE
Payer: MEDICARE

## 2022-11-23 ENCOUNTER — APPOINTMENT (OUTPATIENT)
Dept: CT IMAGING | Age: 72
DRG: 308 | End: 2022-11-23
Attending: INTERNAL MEDICINE
Payer: MEDICARE

## 2022-11-23 LAB
ALBUMIN SERPL-MCNC: 3.4 G/DL (ref 3.5–5)
ALBUMIN/GLOB SERPL: 1 {RATIO} (ref 1.1–2.2)
ALP SERPL-CCNC: 80 U/L (ref 45–117)
ALT SERPL-CCNC: 39 U/L (ref 12–78)
ANION GAP SERPL CALC-SCNC: 5 MMOL/L (ref 5–15)
AST SERPL W P-5'-P-CCNC: 41 U/L (ref 15–37)
ATRIAL RATE: 144 BPM
BASOPHILS # BLD: 0 K/UL (ref 0–0.1)
BASOPHILS NFR BLD: 0 % (ref 0–1)
BILIRUB SERPL-MCNC: 0.3 MG/DL (ref 0.2–1)
BUN SERPL-MCNC: 12 MG/DL (ref 6–20)
BUN/CREAT SERPL: 15 (ref 12–20)
CA-I BLD-MCNC: 8.4 MG/DL (ref 8.5–10.1)
CALCULATED R AXIS, ECG10: 92 DEGREES
CALCULATED T AXIS, ECG11: 47 DEGREES
CHLORIDE SERPL-SCNC: 101 MMOL/L (ref 97–108)
CO2 SERPL-SCNC: 29 MMOL/L (ref 21–32)
CREAT SERPL-MCNC: 0.79 MG/DL (ref 0.55–1.02)
D DIMER PPP FEU-MCNC: 0.61 UG/ML(FEU)
DIAGNOSIS, 93000: NORMAL
DIFFERENTIAL METHOD BLD: ABNORMAL
ECHO AO DESC DIAM: 2.1 CM
ECHO AO DESCENDING AORTA INDEX: 1.3 CM/M2
ECHO AO ROOT DIAM: 3.2 CM
ECHO AO ROOT INDEX: 1.99 CM/M2
ECHO AV MEAN GRADIENT: 4 MMHG
ECHO AV MEAN VELOCITY: 0.9 M/S
ECHO AV PEAK GRADIENT: 8 MMHG
ECHO AV PEAK VELOCITY: 1.4 M/S
ECHO AV VELOCITY RATIO: 0.86
ECHO AV VTI: 26.1 CM
ECHO EST RA PRESSURE: 8 MMHG
ECHO IVC PROX: 1.9 CM
ECHO LA AREA 4C: 18.7 CM2
ECHO LA DIAMETER INDEX: 2.36 CM/M2
ECHO LA DIAMETER: 3.8 CM
ECHO LA MAJOR AXIS: 6.1 CM
ECHO LA TO AORTIC ROOT RATIO: 1.19
ECHO LV EJECTION FRACTION BIPLANE: 49 % (ref 55–100)
ECHO LV FRACTIONAL SHORTENING: 25 % (ref 28–44)
ECHO LV INTERNAL DIMENSION DIASTOLE INDEX: 1.99 CM/M2
ECHO LV INTERNAL DIMENSION DIASTOLIC: 3.2 CM (ref 3.9–5.3)
ECHO LV INTERNAL DIMENSION SYSTOLIC INDEX: 1.49 CM/M2
ECHO LV INTERNAL DIMENSION SYSTOLIC: 2.4 CM
ECHO LV IVSD: 1.3 CM (ref 0.6–0.9)
ECHO LV MASS 2D: 97.2 G (ref 67–162)
ECHO LV MASS INDEX 2D: 60.4 G/M2 (ref 43–95)
ECHO LV POSTERIOR WALL DIASTOLIC: 0.8 CM (ref 0.6–0.9)
ECHO LV RELATIVE WALL THICKNESS RATIO: 0.5
ECHO LVOT AV VTI INDEX: 0.84
ECHO LVOT MEAN GRADIENT: 3 MMHG
ECHO LVOT PEAK GRADIENT: 6 MMHG
ECHO LVOT PEAK VELOCITY: 1.2 M/S
ECHO LVOT VTI: 21.9 CM
ECHO MV LVOT VTI INDEX: 1.42
ECHO MV MAX VELOCITY: 1.7 M/S
ECHO MV MEAN GRADIENT: 3 MMHG
ECHO MV MEAN VELOCITY: 0.7 M/S
ECHO MV PEAK GRADIENT: 11 MMHG
ECHO MV REGURGITANT PEAK GRADIENT: 71 MMHG
ECHO MV REGURGITANT PEAK VELOCITY: 4.2 M/S
ECHO MV VTI: 31 CM
ECHO RIGHT VENTRICULAR SYSTOLIC PRESSURE (RVSP): 47 MMHG
ECHO RV TAPSE: 1.5 CM (ref 1.7–?)
ECHO TV REGURGITANT MAX VELOCITY: 3.14 M/S
ECHO TV REGURGITANT PEAK GRADIENT: 39 MMHG
EOSINOPHIL # BLD: 0 K/UL (ref 0–0.4)
EOSINOPHIL NFR BLD: 0 % (ref 0–7)
ERYTHROCYTE [DISTWIDTH] IN BLOOD BY AUTOMATED COUNT: 13.7 % (ref 11.5–14.5)
GLOBULIN SER CALC-MCNC: 3.5 G/DL (ref 2–4)
GLUCOSE SERPL-MCNC: 205 MG/DL (ref 65–100)
HCT VFR BLD AUTO: 28.2 % (ref 35–47)
HGB BLD-MCNC: 9 G/DL (ref 11.5–16)
IMM GRANULOCYTES # BLD AUTO: 0 K/UL (ref 0–0.04)
IMM GRANULOCYTES NFR BLD AUTO: 0 % (ref 0–0.5)
LYMPHOCYTES # BLD: 0.3 K/UL (ref 0.8–3.5)
LYMPHOCYTES NFR BLD: 7 % (ref 12–49)
MCH RBC QN AUTO: 29.8 PG (ref 26–34)
MCHC RBC AUTO-ENTMCNC: 31.9 G/DL (ref 30–36.5)
MCV RBC AUTO: 93.4 FL (ref 80–99)
MONOCYTES # BLD: 0.1 K/UL (ref 0–1)
MONOCYTES NFR BLD: 1 % (ref 5–13)
NEUTS SEG # BLD: 4.4 K/UL (ref 1.8–8)
NEUTS SEG NFR BLD: 92 % (ref 32–75)
NRBC # BLD: 0 K/UL (ref 0–0.01)
NRBC BLD-RTO: 0 PER 100 WBC
PLATELET # BLD AUTO: 290 K/UL (ref 150–400)
PMV BLD AUTO: 9.7 FL (ref 8.9–12.9)
POTASSIUM SERPL-SCNC: 4.1 MMOL/L (ref 3.5–5.1)
PROT SERPL-MCNC: 6.9 G/DL (ref 6.4–8.2)
Q-T INTERVAL, ECG07: 300 MS
QRS DURATION, ECG06: 78 MS
QTC CALCULATION (BEZET), ECG08: 477 MS
RBC # BLD AUTO: 3.02 M/UL (ref 3.8–5.2)
SODIUM SERPL-SCNC: 135 MMOL/L (ref 136–145)
VENTRICULAR RATE, ECG03: 152 BPM
WBC # BLD AUTO: 4.8 K/UL (ref 3.6–11)

## 2022-11-23 PROCEDURE — 74011000250 HC RX REV CODE- 250: Performed by: FAMILY MEDICINE

## 2022-11-23 PROCEDURE — 74011000258 HC RX REV CODE- 258: Performed by: INTERNAL MEDICINE

## 2022-11-23 PROCEDURE — 74011250637 HC RX REV CODE- 250/637: Performed by: FAMILY MEDICINE

## 2022-11-23 PROCEDURE — 74011000258 HC RX REV CODE- 258: Performed by: FAMILY MEDICINE

## 2022-11-23 PROCEDURE — 74011250637 HC RX REV CODE- 250/637: Performed by: INTERNAL MEDICINE

## 2022-11-23 PROCEDURE — 94640 AIRWAY INHALATION TREATMENT: CPT

## 2022-11-23 PROCEDURE — 74011250636 HC RX REV CODE- 250/636: Performed by: INTERNAL MEDICINE

## 2022-11-23 PROCEDURE — 85379 FIBRIN DEGRADATION QUANT: CPT

## 2022-11-23 PROCEDURE — 87086 URINE CULTURE/COLONY COUNT: CPT

## 2022-11-23 PROCEDURE — 80053 COMPREHEN METABOLIC PANEL: CPT

## 2022-11-23 PROCEDURE — 93306 TTE W/DOPPLER COMPLETE: CPT

## 2022-11-23 PROCEDURE — 74011250636 HC RX REV CODE- 250/636: Performed by: FAMILY MEDICINE

## 2022-11-23 PROCEDURE — 77010033678 HC OXYGEN DAILY

## 2022-11-23 PROCEDURE — 65610000006 HC RM INTENSIVE CARE

## 2022-11-23 PROCEDURE — 71275 CT ANGIOGRAPHY CHEST: CPT

## 2022-11-23 PROCEDURE — 36415 COLL VENOUS BLD VENIPUNCTURE: CPT

## 2022-11-23 PROCEDURE — 85025 COMPLETE CBC W/AUTO DIFF WBC: CPT

## 2022-11-23 PROCEDURE — 74011000636 HC RX REV CODE- 636: Performed by: FAMILY MEDICINE

## 2022-11-23 RX ORDER — DIGOXIN 0.25 MG/ML
0.25 INJECTION INTRAMUSCULAR; INTRAVENOUS
Status: COMPLETED | OUTPATIENT
Start: 2022-11-23 | End: 2022-11-23

## 2022-11-23 RX ORDER — METOPROLOL TARTRATE 25 MG/1
25 TABLET, FILM COATED ORAL 2 TIMES DAILY
Status: DISCONTINUED | OUTPATIENT
Start: 2022-11-23 | End: 2022-11-29

## 2022-11-23 RX ORDER — METOPROLOL TARTRATE 25 MG/1
25 TABLET, FILM COATED ORAL ONCE
Status: COMPLETED | OUTPATIENT
Start: 2022-11-23 | End: 2022-11-23

## 2022-11-23 RX ORDER — DIGOXIN 0.25 MG/ML
250 INJECTION INTRAMUSCULAR; INTRAVENOUS
Status: COMPLETED | OUTPATIENT
Start: 2022-11-23 | End: 2022-11-23

## 2022-11-23 RX ORDER — DILTIAZEM HYDROCHLORIDE 120 MG/1
240 CAPSULE, COATED, EXTENDED RELEASE ORAL DAILY
Status: DISCONTINUED | OUTPATIENT
Start: 2022-11-23 | End: 2022-11-25

## 2022-11-23 RX ORDER — DILTIAZEM HYDROCHLORIDE 120 MG/1
240 CAPSULE, COATED, EXTENDED RELEASE ORAL DAILY
Status: DISCONTINUED | OUTPATIENT
Start: 2022-11-24 | End: 2022-11-23

## 2022-11-23 RX ORDER — DIGOXIN 125 MCG
0.12 TABLET ORAL DAILY
Status: DISCONTINUED | OUTPATIENT
Start: 2022-11-24 | End: 2022-11-23

## 2022-11-23 RX ADMIN — METHYLPREDNISOLONE SODIUM SUCCINATE 40 MG: 40 INJECTION, POWDER, FOR SOLUTION INTRAMUSCULAR; INTRAVENOUS at 17:45

## 2022-11-23 RX ADMIN — POLYETHYLENE GLYCOL 3350 17 G: 17 POWDER, FOR SOLUTION ORAL at 11:19

## 2022-11-23 RX ADMIN — DOXYCYCLINE 100 MG: 100 INJECTION, POWDER, LYOPHILIZED, FOR SOLUTION INTRAVENOUS at 22:25

## 2022-11-23 RX ADMIN — IPRATROPIUM BROMIDE AND ALBUTEROL SULFATE 3 ML: .5; 2.5 SOLUTION RESPIRATORY (INHALATION) at 19:51

## 2022-11-23 RX ADMIN — HYDROXYCHLOROQUINE SULFATE 200 MG: 200 TABLET, FILM COATED ORAL at 08:16

## 2022-11-23 RX ADMIN — METOPROLOL TARTRATE 25 MG: 25 TABLET, FILM COATED ORAL at 11:13

## 2022-11-23 RX ADMIN — DIGOXIN 250 MCG: 0.25 INJECTION INTRAMUSCULAR; INTRAVENOUS at 13:11

## 2022-11-23 RX ADMIN — GABAPENTIN 400 MG: 400 CAPSULE ORAL at 08:17

## 2022-11-23 RX ADMIN — METOPROLOL TARTRATE 25 MG: 25 TABLET, FILM COATED ORAL at 14:32

## 2022-11-23 RX ADMIN — BUDESONIDE AND FORMOTEROL FUMARATE DIHYDRATE 2 PUFF: 160; 4.5 AEROSOL RESPIRATORY (INHALATION) at 20:11

## 2022-11-23 RX ADMIN — DILTIAZEM HYDROCHLORIDE 240 MG: 120 CAPSULE, COATED, EXTENDED RELEASE ORAL at 14:32

## 2022-11-23 RX ADMIN — BUDESONIDE AND FORMOTEROL FUMARATE DIHYDRATE 2 PUFF: 160; 4.5 AEROSOL RESPIRATORY (INHALATION) at 07:54

## 2022-11-23 RX ADMIN — PANTOPRAZOLE SODIUM 40 MG: 40 TABLET, DELAYED RELEASE ORAL at 08:17

## 2022-11-23 RX ADMIN — METHYLPREDNISOLONE SODIUM SUCCINATE 40 MG: 40 INJECTION, POWDER, FOR SOLUTION INTRAMUSCULAR; INTRAVENOUS at 00:08

## 2022-11-23 RX ADMIN — IPRATROPIUM BROMIDE AND ALBUTEROL SULFATE 3 ML: .5; 2.5 SOLUTION RESPIRATORY (INHALATION) at 01:28

## 2022-11-23 RX ADMIN — GABAPENTIN 400 MG: 400 CAPSULE ORAL at 20:38

## 2022-11-23 RX ADMIN — METHYLPREDNISOLONE SODIUM SUCCINATE 40 MG: 40 INJECTION, POWDER, FOR SOLUTION INTRAMUSCULAR; INTRAVENOUS at 23:33

## 2022-11-23 RX ADMIN — MONTELUKAST 10 MG: 10 TABLET, FILM COATED ORAL at 08:16

## 2022-11-23 RX ADMIN — DIGOXIN 250 MCG: 0.25 INJECTION INTRAMUSCULAR; INTRAVENOUS at 11:13

## 2022-11-23 RX ADMIN — DOXYCYCLINE 100 MG: 100 INJECTION, POWDER, LYOPHILIZED, FOR SOLUTION INTRAVENOUS at 11:19

## 2022-11-23 RX ADMIN — APIXABAN 5 MG: 5 TABLET, FILM COATED ORAL at 20:38

## 2022-11-23 RX ADMIN — IOPAMIDOL 80 ML: 755 INJECTION, SOLUTION INTRAVENOUS at 22:17

## 2022-11-23 RX ADMIN — METHYLPREDNISOLONE SODIUM SUCCINATE 40 MG: 40 INJECTION, POWDER, FOR SOLUTION INTRAMUSCULAR; INTRAVENOUS at 05:37

## 2022-11-23 RX ADMIN — ACETAMINOPHEN 650 MG: 325 TABLET ORAL at 22:24

## 2022-11-23 RX ADMIN — CEFTRIAXONE SODIUM 1 G: 1 INJECTION, POWDER, FOR SOLUTION INTRAMUSCULAR; INTRAVENOUS at 11:18

## 2022-11-23 RX ADMIN — IPRATROPIUM BROMIDE AND ALBUTEROL SULFATE 3 ML: .5; 2.5 SOLUTION RESPIRATORY (INHALATION) at 07:54

## 2022-11-23 RX ADMIN — DILTIAZEM HYDROCHLORIDE 15 MG/HR: 5 INJECTION, SOLUTION INTRAVENOUS at 03:40

## 2022-11-23 RX ADMIN — METHYLPREDNISOLONE SODIUM SUCCINATE 40 MG: 40 INJECTION, POWDER, FOR SOLUTION INTRAMUSCULAR; INTRAVENOUS at 11:19

## 2022-11-23 RX ADMIN — DILTIAZEM HYDROCHLORIDE 15 MG/HR: 5 INJECTION, SOLUTION INTRAVENOUS at 11:59

## 2022-11-23 RX ADMIN — PRAVASTATIN SODIUM 40 MG: 40 TABLET ORAL at 08:17

## 2022-11-23 RX ADMIN — ACETAMINOPHEN 650 MG: 325 TABLET ORAL at 03:39

## 2022-11-23 RX ADMIN — DULOXETINE HYDROCHLORIDE 60 MG: 30 CAPSULE, DELAYED RELEASE ORAL at 08:17

## 2022-11-23 RX ADMIN — IPRATROPIUM BROMIDE AND ALBUTEROL SULFATE 3 ML: .5; 2.5 SOLUTION RESPIRATORY (INHALATION) at 17:25

## 2022-11-23 RX ADMIN — LEVOTHYROXINE SODIUM 75 MCG: 0.07 TABLET ORAL at 08:17

## 2022-11-23 NOTE — PROGRESS NOTES
General Daily Progress Note          Patient Name:   Dominic Delarosa       YOB: 1950       Age:  70 y.o. Admit Date: 11/22/2022      Subjective:     Patient is a 70y.o. year old female with signal past medical history of chronic A. fib not on anticoagulation secondary to bleed COPD hypothyroidism hyperlipidemia chronic pain neuropathy came to emergency room  Complaining of shortness of breath for last 2 weeks and worsening patient normally at 3 L oxygen by nasal cannula for COPD at home Emergency room seen by the ER physician work-up and showed patient on A. fib with rapid ventricular rate patient received 10 mg of IV Cardizem twice but patient still in rapid ventricular rate patient on Cardizem drip cardiology was consulted by the ER physician cardiology recommended patient to be admitted for further evaluation and treatment patient denies any fever chills has not shortness of breath no nausea no vomiting  In the ER blood work done BNP of 1300      Patient still on Cardizem drip heart rate fluctuating             Objective:     Visit Vitals  /66   Pulse (!) 104   Temp 98.6 °F (37 °C)   Resp 28   Ht 5' 4\" (1.626 m)   Wt 57.6 kg (127 lb)   SpO2 96%   Breastfeeding No   BMI 21.80 kg/m²        Recent Results (from the past 24 hour(s))   CBC WITH AUTOMATED DIFF    Collection Time: 11/22/22  2:32 PM   Result Value Ref Range    WBC 9.3 3.6 - 11.0 K/uL    RBC 3.07 (L) 3.80 - 5.20 M/uL    HGB 9.1 (L) 11.5 - 16.0 g/dL    HCT 29.0 (L) 35.0 - 47.0 %    MCV 94.5 80.0 - 99.0 FL    MCH 29.6 26.0 - 34.0 PG    MCHC 31.4 30.0 - 36.5 g/dL    RDW 13.7 11.5 - 14.5 %    PLATELET 779 476 - 765 K/uL    MPV 9.6 8.9 - 12.9 FL    NRBC 0.0 0.0  WBC    ABSOLUTE NRBC 0.00 0.00 - 0.01 K/uL    NEUTROPHILS 73 32 - 75 %    LYMPHOCYTES 14 12 - 49 %    MONOCYTES 11 5 - 13 %    EOSINOPHILS 1 0 - 7 %    BASOPHILS 1 0 - 1 %    IMMATURE GRANULOCYTES 0 0 - 0.5 %    ABS. NEUTROPHILS 6.8 1.8 - 8.0 K/UL    ABS. LYMPHOCYTES 1.3 0.8 - 3.5 K/UL    ABS. MONOCYTES 1.0 0.0 - 1.0 K/UL    ABS. EOSINOPHILS 0.1 0.0 - 0.4 K/UL    ABS. BASOPHILS 0.1 0.0 - 0.1 K/UL    ABS. IMM. GRANS. 0.0 0.00 - 0.04 K/UL    DF AUTOMATED     METABOLIC PANEL, COMPREHENSIVE    Collection Time: 11/22/22  2:32 PM   Result Value Ref Range    Sodium 137 136 - 145 mmol/L    Potassium 4.8 3.5 - 5.1 mmol/L    Chloride 100 97 - 108 mmol/L    CO2 30 21 - 32 mmol/L    Anion gap 7 5 - 15 mmol/L    Glucose 92 65 - 100 mg/dL    BUN 9 6 - 20 mg/dL    Creatinine 0.72 0.55 - 1.02 mg/dL    BUN/Creatinine ratio 13 12 - 20      eGFR >60 >60 ml/min/1.73m2    Calcium 8.9 8.5 - 10.1 mg/dL    Bilirubin, total 0.4 0.2 - 1.0 mg/dL    AST (SGOT) 23 15 - 37 U/L    ALT (SGPT) 19 12 - 78 U/L    Alk.  phosphatase 66 45 - 117 U/L    Protein, total 6.7 6.4 - 8.2 g/dL    Albumin 3.7 3.5 - 5.0 g/dL    Globulin 3.0 2.0 - 4.0 g/dL    A-G Ratio 1.2 1.1 - 2.2     TROPONIN-HIGH SENSITIVITY    Collection Time: 11/22/22  2:32 PM   Result Value Ref Range    Troponin-High Sensitivity 49 0 - 51 ng/L   NT-PRO BNP    Collection Time: 11/22/22  2:32 PM   Result Value Ref Range    NT pro-BNP 1,300 (H) <125 pg/mL   URINALYSIS W/ RFLX MICROSCOPIC    Collection Time: 11/22/22  4:12 PM   Result Value Ref Range    Color Yellow/Straw      Appearance Clear Clear      Specific gravity 1.005 1.003 - 1.030      pH (UA) 7.0 5.0 - 8.0      Protein Negative Negative mg/dL    Glucose Negative Negative mg/dL    Ketone Negative Negative mg/dL    Bilirubin Negative Negative      Blood Negative Negative      Urobilinogen 0.1 0.1 - 1.0 EU/dL    Nitrites Negative Negative      Leukocyte Esterase Moderate (A) Negative      WBC 20-50 0 - 4 /hpf    RBC 0-5 0 - 5 /hpf    Bacteria Negative Negative /hpf   URINE MICROSCOPIC    Collection Time: 11/22/22  4:12 PM   Result Value Ref Range    WBC 20-50 0 - 4 /hpf    RBC 0-5 0 - 5 /hpf    Bacteria Negative Negative /hpf   METABOLIC PANEL, COMPREHENSIVE    Collection Time: 11/23/22 3:49 AM   Result Value Ref Range    Sodium 135 (L) 136 - 145 mmol/L    Potassium 4.1 3.5 - 5.1 mmol/L    Chloride 101 97 - 108 mmol/L    CO2 29 21 - 32 mmol/L    Anion gap 5 5 - 15 mmol/L    Glucose 205 (H) 65 - 100 mg/dL    BUN 12 6 - 20 mg/dL    Creatinine 0.79 0.55 - 1.02 mg/dL    BUN/Creatinine ratio 15 12 - 20      eGFR >60 >60 ml/min/1.73m2    Calcium 8.4 (L) 8.5 - 10.1 mg/dL    Bilirubin, total 0.3 0.2 - 1.0 mg/dL    AST (SGOT) 41 (H) 15 - 37 U/L    ALT (SGPT) 39 12 - 78 U/L    Alk. phosphatase 80 45 - 117 U/L    Protein, total 6.9 6.4 - 8.2 g/dL    Albumin 3.4 (L) 3.5 - 5.0 g/dL    Globulin 3.5 2.0 - 4.0 g/dL    A-G Ratio 1.0 (L) 1.1 - 2.2     CBC WITH AUTOMATED DIFF    Collection Time: 11/23/22  3:49 AM   Result Value Ref Range    WBC 4.8 3.6 - 11.0 K/uL    RBC 3.02 (L) 3.80 - 5.20 M/uL    HGB 9.0 (L) 11.5 - 16.0 g/dL    HCT 28.2 (L) 35.0 - 47.0 %    MCV 93.4 80.0 - 99.0 FL    MCH 29.8 26.0 - 34.0 PG    MCHC 31.9 30.0 - 36.5 g/dL    RDW 13.7 11.5 - 14.5 %    PLATELET 089 174 - 101 K/uL    MPV 9.7 8.9 - 12.9 FL    NRBC 0.0 0.0  WBC    ABSOLUTE NRBC 0.00 0.00 - 0.01 K/uL    NEUTROPHILS 92 (H) 32 - 75 %    LYMPHOCYTES 7 (L) 12 - 49 %    MONOCYTES 1 (L) 5 - 13 %    EOSINOPHILS 0 0 - 7 %    BASOPHILS 0 0 - 1 %    IMMATURE GRANULOCYTES 0 0 - 0.5 %    ABS. NEUTROPHILS 4.4 1.8 - 8.0 K/UL    ABS. LYMPHOCYTES 0.3 (L) 0.8 - 3.5 K/UL    ABS. MONOCYTES 0.1 0.0 - 1.0 K/UL    ABS. EOSINOPHILS 0.0 0.0 - 0.4 K/UL    ABS. BASOPHILS 0.0 0.0 - 0.1 K/UL    ABS. IMM. GRANS. 0.0 0.00 - 0.04 K/UL    DF AUTOMATED       [unfilled]      Review of Systems    Constitutional: Negative for chills and fever. HENT: Negative. Eyes: Negative. Respiratory: Negative. Cardiovascular: Negative. Gastrointestinal: Negative for abdominal pain and nausea. Skin: Negative. Neurological: Negative. Physical Exam:      Constitutional: pt is oriented to person, place, and time.    HENT:   Head: Normocephalic and atraumatic. Eyes: Pupils are equal, round, and reactive to light. EOM are normal.   Cardiovascular: Normal rate, regular rhythm and normal heart sounds. Pulmonary/Chest: Breath sounds normal. No wheezes. No rales. Exhibits no tenderness. Abdominal: Soft. Bowel sounds are normal. There is no abdominal tenderness. There is no rebound and no guarding. Musculoskeletal: Normal range of motion. Neurological: pt is alert and oriented to person, place, and time. XR CHEST PORT   Final Result      No acute process on portable chest.              Recent Results (from the past 24 hour(s))   CBC WITH AUTOMATED DIFF    Collection Time: 11/22/22  2:32 PM   Result Value Ref Range    WBC 9.3 3.6 - 11.0 K/uL    RBC 3.07 (L) 3.80 - 5.20 M/uL    HGB 9.1 (L) 11.5 - 16.0 g/dL    HCT 29.0 (L) 35.0 - 47.0 %    MCV 94.5 80.0 - 99.0 FL    MCH 29.6 26.0 - 34.0 PG    MCHC 31.4 30.0 - 36.5 g/dL    RDW 13.7 11.5 - 14.5 %    PLATELET 176 336 - 049 K/uL    MPV 9.6 8.9 - 12.9 FL    NRBC 0.0 0.0  WBC    ABSOLUTE NRBC 0.00 0.00 - 0.01 K/uL    NEUTROPHILS 73 32 - 75 %    LYMPHOCYTES 14 12 - 49 %    MONOCYTES 11 5 - 13 %    EOSINOPHILS 1 0 - 7 %    BASOPHILS 1 0 - 1 %    IMMATURE GRANULOCYTES 0 0 - 0.5 %    ABS. NEUTROPHILS 6.8 1.8 - 8.0 K/UL    ABS. LYMPHOCYTES 1.3 0.8 - 3.5 K/UL    ABS. MONOCYTES 1.0 0.0 - 1.0 K/UL    ABS. EOSINOPHILS 0.1 0.0 - 0.4 K/UL    ABS. BASOPHILS 0.1 0.0 - 0.1 K/UL    ABS. IMM.  GRANS. 0.0 0.00 - 0.04 K/UL    DF AUTOMATED     METABOLIC PANEL, COMPREHENSIVE    Collection Time: 11/22/22  2:32 PM   Result Value Ref Range    Sodium 137 136 - 145 mmol/L    Potassium 4.8 3.5 - 5.1 mmol/L    Chloride 100 97 - 108 mmol/L    CO2 30 21 - 32 mmol/L    Anion gap 7 5 - 15 mmol/L    Glucose 92 65 - 100 mg/dL    BUN 9 6 - 20 mg/dL    Creatinine 0.72 0.55 - 1.02 mg/dL    BUN/Creatinine ratio 13 12 - 20      eGFR >60 >60 ml/min/1.73m2    Calcium 8.9 8.5 - 10.1 mg/dL    Bilirubin, total 0.4 0.2 - 1.0 mg/dL    AST (SGOT) 23 15 - 37 U/L    ALT (SGPT) 19 12 - 78 U/L    Alk. phosphatase 66 45 - 117 U/L    Protein, total 6.7 6.4 - 8.2 g/dL    Albumin 3.7 3.5 - 5.0 g/dL    Globulin 3.0 2.0 - 4.0 g/dL    A-G Ratio 1.2 1.1 - 2.2     TROPONIN-HIGH SENSITIVITY    Collection Time: 11/22/22  2:32 PM   Result Value Ref Range    Troponin-High Sensitivity 49 0 - 51 ng/L   NT-PRO BNP    Collection Time: 11/22/22  2:32 PM   Result Value Ref Range    NT pro-BNP 1,300 (H) <125 pg/mL   URINALYSIS W/ RFLX MICROSCOPIC    Collection Time: 11/22/22  4:12 PM   Result Value Ref Range    Color Yellow/Straw      Appearance Clear Clear      Specific gravity 1.005 1.003 - 1.030      pH (UA) 7.0 5.0 - 8.0      Protein Negative Negative mg/dL    Glucose Negative Negative mg/dL    Ketone Negative Negative mg/dL    Bilirubin Negative Negative      Blood Negative Negative      Urobilinogen 0.1 0.1 - 1.0 EU/dL    Nitrites Negative Negative      Leukocyte Esterase Moderate (A) Negative      WBC 20-50 0 - 4 /hpf    RBC 0-5 0 - 5 /hpf    Bacteria Negative Negative /hpf   URINE MICROSCOPIC    Collection Time: 11/22/22  4:12 PM   Result Value Ref Range    WBC 20-50 0 - 4 /hpf    RBC 0-5 0 - 5 /hpf    Bacteria Negative Negative /hpf   METABOLIC PANEL, COMPREHENSIVE    Collection Time: 11/23/22  3:49 AM   Result Value Ref Range    Sodium 135 (L) 136 - 145 mmol/L    Potassium 4.1 3.5 - 5.1 mmol/L    Chloride 101 97 - 108 mmol/L    CO2 29 21 - 32 mmol/L    Anion gap 5 5 - 15 mmol/L    Glucose 205 (H) 65 - 100 mg/dL    BUN 12 6 - 20 mg/dL    Creatinine 0.79 0.55 - 1.02 mg/dL    BUN/Creatinine ratio 15 12 - 20      eGFR >60 >60 ml/min/1.73m2    Calcium 8.4 (L) 8.5 - 10.1 mg/dL    Bilirubin, total 0.3 0.2 - 1.0 mg/dL    AST (SGOT) 41 (H) 15 - 37 U/L    ALT (SGPT) 39 12 - 78 U/L    Alk.  phosphatase 80 45 - 117 U/L    Protein, total 6.9 6.4 - 8.2 g/dL    Albumin 3.4 (L) 3.5 - 5.0 g/dL    Globulin 3.5 2.0 - 4.0 g/dL    A-G Ratio 1.0 (L) 1.1 - 2.2     CBC WITH AUTOMATED DIFF Collection Time: 11/23/22  3:49 AM   Result Value Ref Range    WBC 4.8 3.6 - 11.0 K/uL    RBC 3.02 (L) 3.80 - 5.20 M/uL    HGB 9.0 (L) 11.5 - 16.0 g/dL    HCT 28.2 (L) 35.0 - 47.0 %    MCV 93.4 80.0 - 99.0 FL    MCH 29.8 26.0 - 34.0 PG    MCHC 31.9 30.0 - 36.5 g/dL    RDW 13.7 11.5 - 14.5 %    PLATELET 290 450 - 427 K/uL    MPV 9.7 8.9 - 12.9 FL    NRBC 0.0 0.0  WBC    ABSOLUTE NRBC 0.00 0.00 - 0.01 K/uL    NEUTROPHILS 92 (H) 32 - 75 %    LYMPHOCYTES 7 (L) 12 - 49 %    MONOCYTES 1 (L) 5 - 13 %    EOSINOPHILS 0 0 - 7 %    BASOPHILS 0 0 - 1 %    IMMATURE GRANULOCYTES 0 0 - 0.5 %    ABS. NEUTROPHILS 4.4 1.8 - 8.0 K/UL    ABS. LYMPHOCYTES 0.3 (L) 0.8 - 3.5 K/UL    ABS. MONOCYTES 0.1 0.0 - 1.0 K/UL    ABS. EOSINOPHILS 0.0 0.0 - 0.4 K/UL    ABS. BASOPHILS 0.0 0.0 - 0.1 K/UL    ABS. IMM. GRANS. 0.0 0.00 - 0.04 K/UL    DF AUTOMATED         Results       Procedure Component Value Units Date/Time    CULTURE, BLOOD, PAIRED [[id number]]     Order Status: Canceled Specimen: Blood     COVID-19 RAPID TEST [[id number]]     Order Status: Canceled     INFLUENZA A & B AG (RAPID TEST) [[id number]]     Order Status: Canceled Specimen: Nasopharyngeal              Labs:     Recent Labs     11/23/22  0349 11/22/22  1432   WBC 4.8 9.3   HGB 9.0* 9.1*   HCT 28.2* 29.0*    337     Recent Labs     11/23/22  0349 11/22/22  1432   * 137   K 4.1 4.8    100   CO2 29 30   BUN 12 9   CREA 0.79 0.72   * 92   CA 8.4* 8.9     Recent Labs     11/23/22  0349 11/22/22  1432   ALT 39 19   AP 80 66   TBILI 0.3 0.4   TP 6.9 6.7   ALB 3.4* 3.7   GLOB 3.5 3.0     No results for input(s): INR, PTP, APTT, INREXT in the last 72 hours. No results for input(s): FE, TIBC, PSAT, FERR in the last 72 hours. Lab Results   Component Value Date/Time    Folate 12.3 07/12/2018 08:10 AM      No results for input(s): PH, PCO2, PO2 in the last 72 hours. No results for input(s): CPK, CKNDX, TROIQ in the last 72 hours.     No lab exists for component: CPKMB  No results found for: CHOL, CHOLX, CHLST, CHOLV, HDL, HDLP, LDL, LDLC, DLDLP, TGLX, TRIGL, TRIGP, CHHD, CHHDX  No results found for: [facility]  Lab Results   Component Value Date/Time    Color Yellow/Straw 11/22/2022 04:12 PM    Appearance Clear 11/22/2022 04:12 PM    Specific gravity 1.005 11/22/2022 04:12 PM    pH (UA) 7.0 11/22/2022 04:12 PM    Protein Negative 11/22/2022 04:12 PM    Glucose Negative 11/22/2022 04:12 PM    Ketone Negative 11/22/2022 04:12 PM    Bilirubin Negative 11/22/2022 04:12 PM    Urobilinogen 0.1 11/22/2022 04:12 PM    Nitrites Negative 11/22/2022 04:12 PM    Leukocyte Esterase Moderate (A) 11/22/2022 04:12 PM    Bacteria Negative 11/22/2022 04:12 PM    Bacteria Negative 11/22/2022 04:12 PM    WBC 20-50 11/22/2022 04:12 PM    WBC 20-50 11/22/2022 04:12 PM    RBC 0-5 11/22/2022 04:12 PM    RBC 0-5 11/22/2022 04:12 PM         Assessment:     A. fib with rapid ventricular rate  COPD  Chronic hypoxic respiratory failure on 3 L  Hypertension  Hypothyroidism  Depression  Neuropathy  UTI      Plan:     Continue Cardizem drip follow-up with cardiology  COPD follow-up with pulmonologist  Order urine culture start on Rocephin  Continue other home medications        Current Facility-Administered Medications:     dilTIAZem (CARDIZEM) 125 mg in 0.9% sodium chloride 125 mL ([id number]), 0-15 mg/hr, IntraVENous, TITRATE, Asim Flores MD, Last Rate: 15 mL/hr at 11/23/22 0340, 15 mg/hr at 11/23/22 0340    budesonide-formoteroL (SYMBICORT) 160-4.5 mcg/actuation HFA inhaler 2 Puff, 2 Puff, Inhalation, BID, Asim Flores MD, 2 Puff at 11/23/22 0754    hydrOXYzine HCL (ATARAX) tablet 25 mg, 25 mg, Oral, TID PRN, Asim Flores MD    montelukast (SINGULAIR) tablet 10 mg, 10 mg, Oral, DAILY, Asim Flores MD, 10 mg at 11/23/22 0816    [Held by provider] nortriptyline (PAMELOR) capsule 10 mg, 10 mg, Oral, QHS, Asim Flores MD    pantoprazole (PROTONIX) tablet 40 mg, 40 mg, Oral, Saleem Temple MD, 40 mg at 11/23/22 3382    pravastatin (PRAVACHOL) tablet 40 mg, 40 mg, Oral, DAILY, Asim Flores MD, 40 mg at 11/23/22 0817    glucose chewable tablet 16 g, 4 Tablet, Oral, PRN, Scotty Flores MD    glucagon (GLUCAGEN) injection 1 mg, 1 mg, IntraMUSCular, PRN, Scotty Flores MD    acetaminophen (TYLENOL) tablet 650 mg, 650 mg, Oral, Q6H PRN, 650 mg at 11/23/22 0339 **OR** acetaminophen (TYLENOL) suppository 650 mg, 650 mg, Rectal, Q6H PRN, Asim Flores MD    polyethylene glycol (MIRALAX) packet 17 g, 17 g, Oral, DAILY PRN, Asim Flores MD    ondansetron (ZOFRAN ODT) tablet 4 mg, 4 mg, Oral, Q8H PRN **OR** ondansetron (ZOFRAN) injection 4 mg, 4 mg, IntraVENous, Q6H PRN, Asim Flores MD    levothyroxine (SYNTHROID) tablet 75 mcg, 75 mcg, Oral, ACB, Asim Flores MD, 75 mcg at 11/23/22 3668    DULoxetine (CYMBALTA) capsule 60 mg, 60 mg, Oral, DAILY, Asim Flores MD, 60 mg at 11/23/22 0817    gabapentin (NEURONTIN) capsule 400 mg, 400 mg, Oral, BID, Asim Flores MD, 400 mg at 11/23/22 8357    hydrOXYchloroQUINE (PLAQUENIL) tablet 200 mg, 200 mg, Oral, DAILY WITH BREAKFAST, Asim Flores MD, 200 mg at 11/23/22 0816    albuterol-ipratropium (DUO-NEB) 2.5 MG-0.5 MG/3 ML, 3 mL, Nebulization, Q6H RT, Asim Flores MD, 3 mL at 11/23/22 0754    methylPREDNISolone (PF) (SOLU-MEDROL) injection 40 mg, 40 mg, IntraVENous, Q6H, Asim Flores MD, 40 mg at 11/23/22 5373

## 2022-11-23 NOTE — CONSULTS
Pulmonary and critical care consultation    NAME: Arlette Anaya   :  1950   MRN:  [id number]     Date/Time:  2022 5:59 PM    Patient PCP: Aria Mae, DO  ______________________________________________________________________    Attending physician: Dr. Yenny Cortez physician: Dr. Mana Oneal    Reason for consultation:  Acute respiratory failure with hypoxia and atrial fibrillation with rapid ventricular response      HISTORY OF PRESENT ILLNESS:       19-year-old thinly built  lady  I am asked to see for acute on chronic respiratory failure with hypoxia and rapid A. fib    Patient well-known to me from outpatient follow-up  Was seen by me in the clinic and sent to the hospital for evaluation due to rapid heart rate and shortness of breath    History of smoking  History of COPD long-term with advanced lung disease  Chronic respiratory failure with hypoxia normally on 3 L nasal cannula oxygen    Patient is a 70y.o. year old female with signal has a past medical history of chronic A. fib not on anticoagulation secondary to bleed COPD hypothyroidism hyperlipidemia chronic pain neuropathy     Me in outpatient clinic for regular follow-up yesterday complaining of increased palpitations and shortness of breath for last 2 weeks to complaining of increasing cough and discolored phlegm production and some left-sided pleuritic chest discomfort.     Seen by the ER physician work-up and showed patient on A. fib with rapid ventricular rate patient received 10 mg of IV Cardizem twice but patient still in rapid ventricular rate patient on Cardizem drip cardiology was consulted by the ER physician cardiology recommended patient to be admitted for further evaluation and treatment patient denies any fever chills has not shortness of breath no nausea no vomiting    In the ER blood work done BNP of 1300    X-ray showed hyperinflated lungs with no acute infiltrates    Patient currently on IV Cardizem drip    On nasal Cannula oxygen    Past Medical History:   Diagnosis Date    Anxiety     Arrhythmia     svt    Calculus of kidney     Chronic atrial fibrillation (HCC)     COPD     emphysema    Depression     GERD (gastroesophageal reflux disease)     Hypothyroidism     Sciatica     Thyroid disease         Past Surgical History:   Procedure Laterality Date    BREAST SURGERY PROCEDURE UNLISTED      biopsy     HX BREAST BIOPSY Right 2010    HX GYN      LEEP  for CIN2    HX ORTHOPAEDIC      left leg       Social History     Tobacco Use    Smoking status: Former     Types: Cigarettes     Quit date:      Years since quittin.9    Smokeless tobacco: Never   Substance Use Topics    Alcohol use: No        Family History   Problem Relation Age of Onset    Malignant Hyperthermia Neg Hx     Pseudocholinesterase Deficiency Neg Hx     Delayed Awakening Neg Hx     Post-op Nausea/Vomiting Neg Hx     Emergence Delirium Neg Hx     Post-op Cognitive Dysfunction Neg Hx     Other Neg Hx        Allergies   Allergen Reactions    Nsaids (Non-Steroidal Anti-Inflammatory Drug) Unknown (comments)    Sulfa (Sulfonamide Antibiotics) Rash        Prior to Admission medications    Medication Sig Start Date End Date Taking? Authorizing Provider   gabapentin (NEURONTIN) 600 mg tablet Take 600 mg by mouth three (3) times daily. Yes Provider, Historical   hydrOXYzine HCL (ATARAX) 25 mg tablet Take 25 mg by mouth three (3) times daily as needed. Provider, Historical   pantoprazole (PROTONIX) 40 mg tablet Take 40 mg by mouth daily. Provider, Historical   pravastatin (PRAVACHOL) 40 mg tablet Take 40 mg by mouth daily. 18   Provider, Historical   nortriptyline (PAMELOR) 10 mg capsule Take 10 mg by mouth nightly. Provider, Historical   montelukast (SINGULAIR) 10 mg tablet Take 10 mg by mouth daily.     Provider, Historical   acetaminophen (TYLENOL EXTRA STRENGTH) 500 mg tablet Take 500 mg by mouth every six (6) hours as needed. Provider, Historical   levothyroxine (SYNTHROID) 50 mcg tablet Take 50 mcg by mouth Daily (before breakfast). Provider, Historical   dilTIAZem ER (CARDIZEM CD) 120 mg capsule Take 120 mg by mouth daily. Provider, Historical   citalopram (CELEXA) 20 mg tablet Take 20 mg by mouth daily. Provider, Historical   fluticasone-salmeterol (ADVAIR DISKUS) 250-50 mcg/Dose diskus inhaler Take 1 Puff by inhalation every twelve (12) hours. Provider, Historical   IPRATROPIUM/ALBUTEROL SULFATE (COMBIVENT IN) Take 14.7 g by inhalation four (4) times daily. 10/22/10   Provider, Historical   calcium-vitamin D (OYSTER SHELL) 500 mg(1,250mg) -200 unit per tablet Take 1 Tab by mouth two (2) times daily (with meals).     Provider, Historical         Current Facility-Administered Medications:     cefTRIAXone (ROCEPHIN) 1 g in sterile water (preservative free) 10 mL IV syringe, 1 g, IntraVENous, Q24H, Asim Flores MD    dilTIAZem (CARDIZEM) 125 mg in 0.9% sodium chloride 125 mL (Rafb7Now), 0-15 mg/hr, IntraVENous, TITRATE, Asim Flores MD, Last Rate: 15 mL/hr at 11/23/22 0340, 15 mg/hr at 11/23/22 0340    budesonide-formoteroL (SYMBICORT) 160-4.5 mcg/actuation HFA inhaler 2 Puff, 2 Puff, Inhalation, BID, Asim Flores MD, 2 Puff at 11/23/22 0754    hydrOXYzine HCL (ATARAX) tablet 25 mg, 25 mg, Oral, TID PRN, Asim Flores MD    montelukast (SINGULAIR) tablet 10 mg, 10 mg, Oral, DAILY, Asim Flores MD, 10 mg at 11/23/22 0816    [Held by provider] nortriptyline (PAMELOR) capsule 10 mg, 10 mg, Oral, QHS, Asim Flores MD    pantoprazole (PROTONIX) tablet 40 mg, 40 mg, Oral, DAILY, Asim Flores MD, 40 mg at 11/23/22 0817    pravastatin (PRAVACHOL) tablet 40 mg, 40 mg, Oral, DAILY, Asim Flores MD, 40 mg at 11/23/22 0817    glucose chewable tablet 16 g, 4 Tablet, Oral, PRN, Asim Flores MD    glucagon (GLUCAGEN) injection 1 mg, 1 mg, IntraMUSCular, PRN, Keerthi Flores, MD    acetaminophen (TYLENOL) tablet 650 mg, 650 mg, Oral, Q6H PRN, 650 mg at 11/23/22 0339 **OR** acetaminophen (TYLENOL) suppository 650 mg, 650 mg, Rectal, Q6H PRN, Asim Flores MD    polyethylene glycol (MIRALAX) packet 17 g, 17 g, Oral, DAILY PRN, Asim Flores MD    ondansetron (ZOFRAN ODT) tablet 4 mg, 4 mg, Oral, Q8H PRN **OR** ondansetron (ZOFRAN) injection 4 mg, 4 mg, IntraVENous, Q6H PRN, Asim Flores MD    levothyroxine (SYNTHROID) tablet 75 mcg, 75 mcg, Oral, ACB, Asim Flores MD, 75 mcg at 11/23/22 0523    DULoxetine (CYMBALTA) capsule 60 mg, 60 mg, Oral, DAILY, Asim Flores MD, 60 mg at 11/23/22 6483    gabapentin (NEURONTIN) capsule 400 mg, 400 mg, Oral, BID, Asim Flores MD, 400 mg at 11/23/22 8423    hydrOXYchloroQUINE (PLAQUENIL) tablet 200 mg, 200 mg, Oral, DAILY WITH BREAKFAST, Asim Flores MD, 200 mg at 11/23/22 0816    albuterol-ipratropium (DUO-NEB) 2.5 MG-0.5 MG/3 ML, 3 mL, Nebulization, Q6H RT, Asim Flores MD, 3 mL at 11/23/22 0754    methylPREDNISolone (PF) (SOLU-MEDROL) injection 40 mg, 40 mg, IntraVENous, Q6H, Asim Flores MD, 40 mg at 11/23/22 0537    LAB DATA REVIEWED:    Recent Results (from the past 24 hour(s))   CBC WITH AUTOMATED DIFF    Collection Time: 11/22/22  2:32 PM   Result Value Ref Range    WBC 9.3 3.6 - 11.0 K/uL    RBC 3.07 (L) 3.80 - 5.20 M/uL    HGB 9.1 (L) 11.5 - 16.0 g/dL    HCT 29.0 (L) 35.0 - 47.0 %    MCV 94.5 80.0 - 99.0 FL    MCH 29.6 26.0 - 34.0 PG    MCHC 31.4 30.0 - 36.5 g/dL    RDW 13.7 11.5 - 14.5 %    PLATELET 750 908 - 049 K/uL    MPV 9.6 8.9 - 12.9 FL    NRBC 0.0 0.0  WBC    ABSOLUTE NRBC 0.00 0.00 - 0.01 K/uL    NEUTROPHILS 73 32 - 75 %    LYMPHOCYTES 14 12 - 49 %    MONOCYTES 11 5 - 13 %    EOSINOPHILS 1 0 - 7 %    BASOPHILS 1 0 - 1 %    IMMATURE GRANULOCYTES 0 0 - 0.5 %    ABS. NEUTROPHILS 6.8 1.8 - 8.0 K/UL    ABS. LYMPHOCYTES 1.3 0.8 - 3.5 K/UL    ABS. MONOCYTES 1.0 0.0 - 1.0 K/UL    ABS. EOSINOPHILS 0.1 0.0 - 0.4 K/UL    ABS. BASOPHILS 0.1 0.0 - 0.1 K/UL    ABS. IMM. GRANS. 0.0 0.00 - 0.04 K/UL    DF AUTOMATED     METABOLIC PANEL, COMPREHENSIVE    Collection Time: 11/22/22  2:32 PM   Result Value Ref Range    Sodium 137 136 - 145 mmol/L    Potassium 4.8 3.5 - 5.1 mmol/L    Chloride 100 97 - 108 mmol/L    CO2 30 21 - 32 mmol/L    Anion gap 7 5 - 15 mmol/L    Glucose 92 65 - 100 mg/dL    BUN 9 6 - 20 mg/dL    Creatinine 0.72 0.55 - 1.02 mg/dL    BUN/Creatinine ratio 13 12 - 20      eGFR >60 >60 ml/min/1.73m2    Calcium 8.9 8.5 - 10.1 mg/dL    Bilirubin, total 0.4 0.2 - 1.0 mg/dL    AST (SGOT) 23 15 - 37 U/L    ALT (SGPT) 19 12 - 78 U/L    Alk.  phosphatase 66 45 - 117 U/L    Protein, total 6.7 6.4 - 8.2 g/dL    Albumin 3.7 3.5 - 5.0 g/dL    Globulin 3.0 2.0 - 4.0 g/dL    A-G Ratio 1.2 1.1 - 2.2     TROPONIN-HIGH SENSITIVITY    Collection Time: 11/22/22  2:32 PM   Result Value Ref Range    Troponin-High Sensitivity 49 0 - 51 ng/L   NT-PRO BNP    Collection Time: 11/22/22  2:32 PM   Result Value Ref Range    NT pro-BNP 1,300 (H) <125 pg/mL   URINALYSIS W/ RFLX MICROSCOPIC    Collection Time: 11/22/22  4:12 PM   Result Value Ref Range    Color Yellow/Straw      Appearance Clear Clear      Specific gravity 1.005 1.003 - 1.030      pH (UA) 7.0 5.0 - 8.0      Protein Negative Negative mg/dL    Glucose Negative Negative mg/dL    Ketone Negative Negative mg/dL    Bilirubin Negative Negative      Blood Negative Negative      Urobilinogen 0.1 0.1 - 1.0 EU/dL    Nitrites Negative Negative      Leukocyte Esterase Moderate (A) Negative      WBC 20-50 0 - 4 /hpf    RBC 0-5 0 - 5 /hpf    Bacteria Negative Negative /hpf   URINE MICROSCOPIC    Collection Time: 11/22/22  4:12 PM   Result Value Ref Range    WBC 20-50 0 - 4 /hpf    RBC 0-5 0 - 5 /hpf    Bacteria Negative Negative /hpf   METABOLIC PANEL, COMPREHENSIVE    Collection Time: 11/23/22  3:49 AM   Result Value Ref Range    Sodium 135 (L) 136 - 145 mmol/L Potassium 4.1 3.5 - 5.1 mmol/L    Chloride 101 97 - 108 mmol/L    CO2 29 21 - 32 mmol/L    Anion gap 5 5 - 15 mmol/L    Glucose 205 (H) 65 - 100 mg/dL    BUN 12 6 - 20 mg/dL    Creatinine 0.79 0.55 - 1.02 mg/dL    BUN/Creatinine ratio 15 12 - 20      eGFR >60 >60 ml/min/1.73m2    Calcium 8.4 (L) 8.5 - 10.1 mg/dL    Bilirubin, total 0.3 0.2 - 1.0 mg/dL    AST (SGOT) 41 (H) 15 - 37 U/L    ALT (SGPT) 39 12 - 78 U/L    Alk. phosphatase 80 45 - 117 U/L    Protein, total 6.9 6.4 - 8.2 g/dL    Albumin 3.4 (L) 3.5 - 5.0 g/dL    Globulin 3.5 2.0 - 4.0 g/dL    A-G Ratio 1.0 (L) 1.1 - 2.2     CBC WITH AUTOMATED DIFF    Collection Time: 11/23/22  3:49 AM   Result Value Ref Range    WBC 4.8 3.6 - 11.0 K/uL    RBC 3.02 (L) 3.80 - 5.20 M/uL    HGB 9.0 (L) 11.5 - 16.0 g/dL    HCT 28.2 (L) 35.0 - 47.0 %    MCV 93.4 80.0 - 99.0 FL    MCH 29.8 26.0 - 34.0 PG    MCHC 31.9 30.0 - 36.5 g/dL    RDW 13.7 11.5 - 14.5 %    PLATELET 517 532 - 295 K/uL    MPV 9.7 8.9 - 12.9 FL    NRBC 0.0 0.0  WBC    ABSOLUTE NRBC 0.00 0.00 - 0.01 K/uL    NEUTROPHILS 92 (H) 32 - 75 %    LYMPHOCYTES 7 (L) 12 - 49 %    MONOCYTES 1 (L) 5 - 13 %    EOSINOPHILS 0 0 - 7 %    BASOPHILS 0 0 - 1 %    IMMATURE GRANULOCYTES 0 0 - 0.5 %    ABS. NEUTROPHILS 4.4 1.8 - 8.0 K/UL    ABS. LYMPHOCYTES 0.3 (L) 0.8 - 3.5 K/UL    ABS. MONOCYTES 0.1 0.0 - 1.0 K/UL    ABS. EOSINOPHILS 0.0 0.0 - 0.4 K/UL    ABS. BASOPHILS 0.0 0.0 - 0.1 K/UL    ABS. IMM. GRANS. 0.0 0.00 - 0.04 K/UL    DF AUTOMATED         XR Results (most recent):  Results from Hospital Encounter encounter on 11/22/22    XR CHEST PORT    Narrative  EXAM:  XR CHEST PORT    INDICATION: Shortness of breath    COMPARISON: none    TECHNIQUE: portable chest AP view    FINDINGS: The cardiac silhouette is within normal limits. Emphysematous changes  are noted. Central pulmonary arteries are enlarged. There is no focal airspace disease. The visualized bones and upper abdomen are  age-appropriate.     Impression  No acute process on portable chest.         XR CHEST PORT   Final Result      No acute process on portable chest.              Review of Systems:  Review of 12 systems was performed and is noncontributory other than as outlined above    Objective:   VITALS:    Visit Vitals  /66   Pulse (!) 104   Temp 98.6 °F (37 °C)   Resp 28   Ht 5' 4\" (1.626 m)   Wt 57.6 kg (127 lb)   SpO2 96%   Breastfeeding No   BMI 21.80 kg/m²       Physical Exam:   Constitutional: pt is oriented to person, place, and time. HENT:   Head: Normocephalic and atraumatic. Eyes: Pupils are equal, round, and reactive to light. EOM are normal.   Cardiovascular: Irregularly irregular, tachycardic  Pulmonary/Chest: Reduced air entry bilaterally with prolonged exhalation, occasional wheezing, basal crackles  Exhibits no tenderness. Abdominal: Soft. Bowel sounds are normal. There is no abdominal tenderness. There is no rebound and no guarding. Musculoskeletal: Normal range of motion. Neurological: pt is alert and oriented to person, place, and time. Alert. Normal strength. No cranial nerve deficit or sensory deficit. Displays a negative Romberg sign. ASSESSMENT:    1. Acute on chronic respiratory failure with hypoxia  2. Acute exacerbation of COPD  3. Paroxysmal atrial fibrillation with rapid ventricular response  4. Left-sided pleuritic chest pain  5. Hypertension  6.   Depression    Plan:    Patient admitted to the ICU  Will be watched here closely    Tenuous clinical status  Critically ill    Continue nasal cannula oxygen as needed to keep saturation above 92%    Patient in rapid A. fib  Has been complaining of left-sided pleuritic chest pain  Will check D-dimer  We will set up patient for CTA to rule out PE since chest x-ray is clear and does not show any acute infiltrates    Acute exacerbation of COPD  Continue Solu-Medrol  Continue Rocephin  Add doxycycline for atypical coverage  Nebulizers as needed  Symbicort inhaler    Await cardiology input  Continue IV Cardizem drip   placed on Lasix 40 mg IV  Check electrolytes and replace as needed  Intake and output charting  Monitor renal function with fluid changes    Continue blood pressure medications  Cymbalta 60 mg daily  Neurontin 400 mg twice a day    DVT and GI prophylaxis    Tenuous clinical status  Prognosis guarded  Monitor closely in ICU    Questions of patient were answered at bedside in detail  Case discussed in detail with RN, RT, and care team  Thank you for involving me in the care of this patient  I will follow with you closely during hospitalization    Time spent more than 60 minutes under patient care with no overlap reviewing results and records, decision making, and answering questions.     Rosana Marroquin MD  Pulmonary/CC

## 2022-11-23 NOTE — PROGRESS NOTES
Cardiology Consult    NAME: Cole Luo   :  1950   MRN:  [id number]     Date/Time:  2022 12:02 PM    Patient PCP: Alka Chaudhari, DO  ________________________________________________________________________     Assessment:   A. fib with RVR  COPD on home oxygen  Paroxysmal SVT  Essential hypertension      Plan:   Digoxin load   Wean off IV Cardizem as HR allows  Start p.o. metoprolol  Continue p.o. diltiazem as BP allows  Already has an schedule appointment with EP for management of atrial arrhythmia  She will need systemic anticoagulation due to A. Fib    75 min spent performing critical care management (medication changes, drip management, exam, and data/device interpretation) including discussions with family and other providers. []        High complexity decision making was performed        Subjective:   CHIEF COMPLAINT: Palpitations      REASON FOR CONSULT: Atrial Fibrillation      HISTORY OF PRESENT ILLNESS:     Cole Luo is a 70 y.o. WHITE/NON- female who has a history of paroxysmal supraventricular tachycardia, COPD on home oxygen, hypertension, and critical CAD, previous cardiac cath in 2016 showed a 30% mid LAD lesion. She is you she has a history of previous GI bleeding not on systemic anticoagulation thus. She went to her pulmonary doctor yesterday after having 3 days of intermittent palpitations and was noted to be in A. fib with RVR and therefore admitted. Echocardiogram 10/5/2022 showed normal EF of 65% with RVSP 30 mmHg. No chest pain, no shortness of breath. Feels palpitations with mild to moderate exertion. Reports compliance with her prescribed medicines.         Past Medical History:   Diagnosis Date    Anxiety     Arrhythmia     svt    Calculus of kidney     Chronic atrial fibrillation (HCC)     COPD     emphysema    Depression     GERD (gastroesophageal reflux disease)     Hypothyroidism     Sciatica     Thyroid disease       Past Surgical History:   Procedure Laterality Date    BREAST SURGERY PROCEDURE UNLISTED      biopsy     HX BREAST BIOPSY Right 2010    HX GYN      LEEP  for CIN2    HX ORTHOPAEDIC      left leg     Allergies   Allergen Reactions    Nsaids (Non-Steroidal Anti-Inflammatory Drug) Unknown (comments)    Sulfa (Sulfonamide Antibiotics) Rash      Meds:  See below  Social History     Tobacco Use    Smoking status: Former     Types: Cigarettes     Quit date:      Years since quittin.9    Smokeless tobacco: Never   Substance Use Topics    Alcohol use: No      Family History   Problem Relation Age of Onset    Malignant Hyperthermia Neg Hx     Pseudocholinesterase Deficiency Neg Hx     Delayed Awakening Neg Hx     Post-op Nausea/Vomiting Neg Hx     Emergence Delirium Neg Hx     Post-op Cognitive Dysfunction Neg Hx     Other Neg Hx        REVIEW OF SYSTEMS:     []         Unable to obtain  ROS due to ---   [x]         Total of 12 systems reviewed as follows:    Constitutional: negative fever, negative chills, negative weight loss  Eyes:   negative visual changes  ENT:   negative sore throat, tongue or lip swelling  Respiratory:  negative cough, negative dyspnea  Cards:  + palpitations  GI:   negative for nausea, vomiting, diarrhea, and abdominal pain  Genitourinary: negative for frequency, dysuria  Integument:  negative for rash   Hematologic:  negative for easy bruising and gum/nose bleeding  Musculoskel: negative for myalgias,  back pain  Neurological:  negative for headaches, dizziness, vertigo, weakness  Behavl/Psych: negative for feelings of anxiety, depression     Pertinent Positives include :    Objective:      Physical Exam:    Last 24hrs VS reviewed since prior progress note.  Most recent are:    Visit Vitals  /66   Pulse (!) 104   Temp 98.6 °F (37 °C)   Resp 28   Ht 5' 4\" (1.626 m)   Wt 57.6 kg (127 lb)   SpO2 96%   Breastfeeding No   BMI 21.80 kg/m²     No intake or output data in the 24 hours ending 11/23/22 1202     General Appearance: Well developed, alert, no acute distress. Ears/Nose/Mouth/Throat: Moist mucosa  Neck: Supple. JVP within normal limits. Carotids good upstrokes  Chest: Lungs clear to auscultation bilaterally. Cardiovascular: Irregularly irregular rhythm S1S2 normal, soft systolic murmur  Abdomen: Soft, non-tender, bowel sounds are active. Extremities: No edema bilaterally. distal pulses +1. Skin: Warm and dry. Neuro: Alert and oriented x3, normal speech; follows simple commands  Psychiatric: Cooperative; appropriate    []         Post-cath site without hematoma, bruit, tenderness, or thrill. Distal pulses intact. Data:      Telemetry:    EKG:  []  No new EKG for review. Prior to Admission medications    Medication Sig Start Date End Date Taking? Authorizing Provider   gabapentin (NEURONTIN) 600 mg tablet Take 600 mg by mouth three (3) times daily. Yes Provider, Historical   hydrOXYzine HCL (ATARAX) 25 mg tablet Take 25 mg by mouth three (3) times daily as needed. Provider, Historical   pantoprazole (PROTONIX) 40 mg tablet Take 40 mg by mouth daily. Provider, Historical   pravastatin (PRAVACHOL) 40 mg tablet Take 40 mg by mouth daily. 7/2/18   Provider, Historical   nortriptyline (PAMELOR) 10 mg capsule Take 10 mg by mouth nightly. Provider, Historical   montelukast (SINGULAIR) 10 mg tablet Take 10 mg by mouth daily. Provider, Historical   acetaminophen (TYLENOL EXTRA STRENGTH) 500 mg tablet Take 500 mg by mouth every six (6) hours as needed. Provider, Historical   levothyroxine (SYNTHROID) 50 mcg tablet Take 50 mcg by mouth Daily (before breakfast). Provider, Historical   dilTIAZem ER (CARDIZEM CD) 120 mg capsule Take 120 mg by mouth daily. Provider, Historical   citalopram (CELEXA) 20 mg tablet Take 20 mg by mouth daily.     Provider, Historical   fluticasone-salmeterol (ADVAIR DISKUS) 250-50 mcg/Dose diskus inhaler Take 1 Puff by inhalation every twelve (12) hours. Provider, Historical   IPRATROPIUM/ALBUTEROL SULFATE (COMBIVENT IN) Take 14.7 g by inhalation four (4) times daily. 10/22/10   Provider, Historical   calcium-vitamin D (OYSTER SHELL) 500 mg(1,250mg) -200 unit per tablet Take 1 Tab by mouth two (2) times daily (with meals). Provider, Historical       Recent Results (from the past 24 hour(s))   CBC WITH AUTOMATED DIFF    Collection Time: 11/22/22  2:32 PM   Result Value Ref Range    WBC 9.3 3.6 - 11.0 K/uL    RBC 3.07 (L) 3.80 - 5.20 M/uL    HGB 9.1 (L) 11.5 - 16.0 g/dL    HCT 29.0 (L) 35.0 - 47.0 %    MCV 94.5 80.0 - 99.0 FL    MCH 29.6 26.0 - 34.0 PG    MCHC 31.4 30.0 - 36.5 g/dL    RDW 13.7 11.5 - 14.5 %    PLATELET 157 414 - 920 K/uL    MPV 9.6 8.9 - 12.9 FL    NRBC 0.0 0.0  WBC    ABSOLUTE NRBC 0.00 0.00 - 0.01 K/uL    NEUTROPHILS 73 32 - 75 %    LYMPHOCYTES 14 12 - 49 %    MONOCYTES 11 5 - 13 %    EOSINOPHILS 1 0 - 7 %    BASOPHILS 1 0 - 1 %    IMMATURE GRANULOCYTES 0 0 - 0.5 %    ABS. NEUTROPHILS 6.8 1.8 - 8.0 K/UL    ABS. LYMPHOCYTES 1.3 0.8 - 3.5 K/UL    ABS. MONOCYTES 1.0 0.0 - 1.0 K/UL    ABS. EOSINOPHILS 0.1 0.0 - 0.4 K/UL    ABS. BASOPHILS 0.1 0.0 - 0.1 K/UL    ABS. IMM. GRANS. 0.0 0.00 - 0.04 K/UL    DF AUTOMATED     METABOLIC PANEL, COMPREHENSIVE    Collection Time: 11/22/22  2:32 PM   Result Value Ref Range    Sodium 137 136 - 145 mmol/L    Potassium 4.8 3.5 - 5.1 mmol/L    Chloride 100 97 - 108 mmol/L    CO2 30 21 - 32 mmol/L    Anion gap 7 5 - 15 mmol/L    Glucose 92 65 - 100 mg/dL    BUN 9 6 - 20 mg/dL    Creatinine 0.72 0.55 - 1.02 mg/dL    BUN/Creatinine ratio 13 12 - 20      eGFR >60 >60 ml/min/1.73m2    Calcium 8.9 8.5 - 10.1 mg/dL    Bilirubin, total 0.4 0.2 - 1.0 mg/dL    AST (SGOT) 23 15 - 37 U/L    ALT (SGPT) 19 12 - 78 U/L    Alk.  phosphatase 66 45 - 117 U/L    Protein, total 6.7 6.4 - 8.2 g/dL    Albumin 3.7 3.5 - 5.0 g/dL    Globulin 3.0 2.0 - 4.0 g/dL    A-G Ratio 1.2 1.1 - 2.2     TROPONIN-HIGH SENSITIVITY    Collection Time: 11/22/22  2:32 PM   Result Value Ref Range    Troponin-High Sensitivity 49 0 - 51 ng/L   NT-PRO BNP    Collection Time: 11/22/22  2:32 PM   Result Value Ref Range    NT pro-BNP 1,300 (H) <125 pg/mL   URINALYSIS W/ RFLX MICROSCOPIC    Collection Time: 11/22/22  4:12 PM   Result Value Ref Range    Color Yellow/Straw      Appearance Clear Clear      Specific gravity 1.005 1.003 - 1.030      pH (UA) 7.0 5.0 - 8.0      Protein Negative Negative mg/dL    Glucose Negative Negative mg/dL    Ketone Negative Negative mg/dL    Bilirubin Negative Negative      Blood Negative Negative      Urobilinogen 0.1 0.1 - 1.0 EU/dL    Nitrites Negative Negative      Leukocyte Esterase Moderate (A) Negative      WBC 20-50 0 - 4 /hpf    RBC 0-5 0 - 5 /hpf    Bacteria Negative Negative /hpf   URINE MICROSCOPIC    Collection Time: 11/22/22  4:12 PM   Result Value Ref Range    WBC 20-50 0 - 4 /hpf    RBC 0-5 0 - 5 /hpf    Bacteria Negative Negative /hpf   METABOLIC PANEL, COMPREHENSIVE    Collection Time: 11/23/22  3:49 AM   Result Value Ref Range    Sodium 135 (L) 136 - 145 mmol/L    Potassium 4.1 3.5 - 5.1 mmol/L    Chloride 101 97 - 108 mmol/L    CO2 29 21 - 32 mmol/L    Anion gap 5 5 - 15 mmol/L    Glucose 205 (H) 65 - 100 mg/dL    BUN 12 6 - 20 mg/dL    Creatinine 0.79 0.55 - 1.02 mg/dL    BUN/Creatinine ratio 15 12 - 20      eGFR >60 >60 ml/min/1.73m2    Calcium 8.4 (L) 8.5 - 10.1 mg/dL    Bilirubin, total 0.3 0.2 - 1.0 mg/dL    AST (SGOT) 41 (H) 15 - 37 U/L    ALT (SGPT) 39 12 - 78 U/L    Alk.  phosphatase 80 45 - 117 U/L    Protein, total 6.9 6.4 - 8.2 g/dL    Albumin 3.4 (L) 3.5 - 5.0 g/dL    Globulin 3.5 2.0 - 4.0 g/dL    A-G Ratio 1.0 (L) 1.1 - 2.2     CBC WITH AUTOMATED DIFF    Collection Time: 11/23/22  3:49 AM   Result Value Ref Range    WBC 4.8 3.6 - 11.0 K/uL    RBC 3.02 (L) 3.80 - 5.20 M/uL    HGB 9.0 (L) 11.5 - 16.0 g/dL    HCT 28.2 (L) 35.0 - 47.0 %    MCV 93.4 80.0 - 99.0 FL    MCH 29.8 26.0 - 34.0 PG    MCHC 31.9 30.0 - 36.5 g/dL    RDW 13.7 11.5 - 14.5 %    PLATELET 038 008 - 938 K/uL    MPV 9.7 8.9 - 12.9 FL    NRBC 0.0 0.0  WBC    ABSOLUTE NRBC 0.00 0.00 - 0.01 K/uL    NEUTROPHILS 92 (H) 32 - 75 %    LYMPHOCYTES 7 (L) 12 - 49 %    MONOCYTES 1 (L) 5 - 13 %    EOSINOPHILS 0 0 - 7 %    BASOPHILS 0 0 - 1 %    IMMATURE GRANULOCYTES 0 0 - 0.5 %    ABS. NEUTROPHILS 4.4 1.8 - 8.0 K/UL    ABS. LYMPHOCYTES 0.3 (L) 0.8 - 3.5 K/UL    ABS. MONOCYTES 0.1 0.0 - 1.0 K/UL    ABS. EOSINOPHILS 0.0 0.0 - 0.4 K/UL    ABS. BASOPHILS 0.0 0.0 - 0.1 K/UL    ABS. IMM.  GRANS. 0.0 0.00 - 0.04 K/UL    DF AUTOMATED          Kisha Morales MD

## 2022-11-24 ENCOUNTER — APPOINTMENT (OUTPATIENT)
Dept: GENERAL RADIOLOGY | Age: 72
DRG: 308 | End: 2022-11-24
Attending: INTERNAL MEDICINE
Payer: MEDICARE

## 2022-11-24 LAB
ALBUMIN SERPL-MCNC: 3.4 G/DL (ref 3.5–5)
ALBUMIN/GLOB SERPL: 1.1 {RATIO} (ref 1.1–2.2)
ALP SERPL-CCNC: 69 U/L (ref 45–117)
ALT SERPL-CCNC: 77 U/L (ref 12–78)
ANION GAP SERPL CALC-SCNC: 6 MMOL/L (ref 5–15)
AST SERPL W P-5'-P-CCNC: 69 U/L (ref 15–37)
BILIRUB SERPL-MCNC: 0.3 MG/DL (ref 0.2–1)
BUN SERPL-MCNC: 19 MG/DL (ref 6–20)
BUN/CREAT SERPL: 19 (ref 12–20)
CA-I BLD-MCNC: 8.2 MG/DL (ref 8.5–10.1)
CHLORIDE SERPL-SCNC: 98 MMOL/L (ref 97–108)
CO2 SERPL-SCNC: 27 MMOL/L (ref 21–32)
CREAT SERPL-MCNC: 1.02 MG/DL (ref 0.55–1.02)
ERYTHROCYTE [DISTWIDTH] IN BLOOD BY AUTOMATED COUNT: 13.8 % (ref 11.5–14.5)
GLOBULIN SER CALC-MCNC: 3 G/DL (ref 2–4)
GLUCOSE SERPL-MCNC: 191 MG/DL (ref 65–100)
HCT VFR BLD AUTO: 24.6 % (ref 35–47)
HGB BLD-MCNC: 8 G/DL (ref 11.5–16)
MCH RBC QN AUTO: 30.1 PG (ref 26–34)
MCHC RBC AUTO-ENTMCNC: 32.5 G/DL (ref 30–36.5)
MCV RBC AUTO: 92.5 FL (ref 80–99)
NRBC # BLD: 0 K/UL (ref 0–0.01)
NRBC BLD-RTO: 0 PER 100 WBC
PLATELET # BLD AUTO: 283 K/UL (ref 150–400)
PMV BLD AUTO: 9.9 FL (ref 8.9–12.9)
POTASSIUM SERPL-SCNC: 4 MMOL/L (ref 3.5–5.1)
PROT SERPL-MCNC: 6.4 G/DL (ref 6.4–8.2)
RBC # BLD AUTO: 2.66 M/UL (ref 3.8–5.2)
SODIUM SERPL-SCNC: 131 MMOL/L (ref 136–145)
WBC # BLD AUTO: 15.7 K/UL (ref 3.6–11)

## 2022-11-24 PROCEDURE — 74011000250 HC RX REV CODE- 250: Performed by: FAMILY MEDICINE

## 2022-11-24 PROCEDURE — 74011250636 HC RX REV CODE- 250/636: Performed by: INTERNAL MEDICINE

## 2022-11-24 PROCEDURE — 82272 OCCULT BLD FECES 1-3 TESTS: CPT

## 2022-11-24 PROCEDURE — 71045 X-RAY EXAM CHEST 1 VIEW: CPT

## 2022-11-24 PROCEDURE — 74011250637 HC RX REV CODE- 250/637: Performed by: INTERNAL MEDICINE

## 2022-11-24 PROCEDURE — 65270000029 HC RM PRIVATE

## 2022-11-24 PROCEDURE — 36415 COLL VENOUS BLD VENIPUNCTURE: CPT

## 2022-11-24 PROCEDURE — 74011250637 HC RX REV CODE- 250/637: Performed by: FAMILY MEDICINE

## 2022-11-24 PROCEDURE — 94640 AIRWAY INHALATION TREATMENT: CPT

## 2022-11-24 PROCEDURE — 74011000258 HC RX REV CODE- 258: Performed by: INTERNAL MEDICINE

## 2022-11-24 PROCEDURE — 80053 COMPREHEN METABOLIC PANEL: CPT

## 2022-11-24 PROCEDURE — 74011250636 HC RX REV CODE- 250/636: Performed by: FAMILY MEDICINE

## 2022-11-24 PROCEDURE — 85027 COMPLETE CBC AUTOMATED: CPT

## 2022-11-24 RX ORDER — POLYETHYLENE GLYCOL 3350 17 G/17G
17 POWDER, FOR SOLUTION ORAL DAILY
Status: DISCONTINUED | OUTPATIENT
Start: 2022-11-25 | End: 2022-11-30 | Stop reason: HOSPADM

## 2022-11-24 RX ADMIN — APIXABAN 5 MG: 5 TABLET, FILM COATED ORAL at 08:23

## 2022-11-24 RX ADMIN — PANTOPRAZOLE SODIUM 40 MG: 40 TABLET, DELAYED RELEASE ORAL at 08:24

## 2022-11-24 RX ADMIN — PRAVASTATIN SODIUM 40 MG: 40 TABLET ORAL at 08:24

## 2022-11-24 RX ADMIN — BUDESONIDE AND FORMOTEROL FUMARATE DIHYDRATE 2 PUFF: 160; 4.5 AEROSOL RESPIRATORY (INHALATION) at 07:41

## 2022-11-24 RX ADMIN — METHYLPREDNISOLONE SODIUM SUCCINATE 40 MG: 40 INJECTION, POWDER, FOR SOLUTION INTRAMUSCULAR; INTRAVENOUS at 12:47

## 2022-11-24 RX ADMIN — GABAPENTIN 400 MG: 400 CAPSULE ORAL at 20:22

## 2022-11-24 RX ADMIN — IPRATROPIUM BROMIDE AND ALBUTEROL SULFATE 3 ML: .5; 2.5 SOLUTION RESPIRATORY (INHALATION) at 02:01

## 2022-11-24 RX ADMIN — IPRATROPIUM BROMIDE AND ALBUTEROL SULFATE 3 ML: .5; 2.5 SOLUTION RESPIRATORY (INHALATION) at 19:34

## 2022-11-24 RX ADMIN — IPRATROPIUM BROMIDE AND ALBUTEROL SULFATE 3 ML: .5; 2.5 SOLUTION RESPIRATORY (INHALATION) at 13:23

## 2022-11-24 RX ADMIN — HYDROXYCHLOROQUINE SULFATE 200 MG: 200 TABLET, FILM COATED ORAL at 08:24

## 2022-11-24 RX ADMIN — CEFTRIAXONE SODIUM 1 G: 1 INJECTION, POWDER, FOR SOLUTION INTRAMUSCULAR; INTRAVENOUS at 12:47

## 2022-11-24 RX ADMIN — DILTIAZEM HYDROCHLORIDE 240 MG: 120 CAPSULE, COATED, EXTENDED RELEASE ORAL at 08:24

## 2022-11-24 RX ADMIN — METOPROLOL TARTRATE 25 MG: 25 TABLET, FILM COATED ORAL at 14:17

## 2022-11-24 RX ADMIN — DOXYCYCLINE 100 MG: 100 INJECTION, POWDER, LYOPHILIZED, FOR SOLUTION INTRAVENOUS at 10:32

## 2022-11-24 RX ADMIN — APIXABAN 5 MG: 5 TABLET, FILM COATED ORAL at 20:22

## 2022-11-24 RX ADMIN — DOXYCYCLINE 100 MG: 100 INJECTION, POWDER, LYOPHILIZED, FOR SOLUTION INTRAVENOUS at 23:55

## 2022-11-24 RX ADMIN — MONTELUKAST 10 MG: 10 TABLET, FILM COATED ORAL at 08:24

## 2022-11-24 RX ADMIN — GABAPENTIN 400 MG: 400 CAPSULE ORAL at 08:24

## 2022-11-24 RX ADMIN — BUDESONIDE AND FORMOTEROL FUMARATE DIHYDRATE 2 PUFF: 160; 4.5 AEROSOL RESPIRATORY (INHALATION) at 19:34

## 2022-11-24 RX ADMIN — METHYLPREDNISOLONE SODIUM SUCCINATE 40 MG: 40 INJECTION, POWDER, FOR SOLUTION INTRAMUSCULAR; INTRAVENOUS at 17:27

## 2022-11-24 RX ADMIN — METHYLPREDNISOLONE SODIUM SUCCINATE 40 MG: 40 INJECTION, POWDER, FOR SOLUTION INTRAMUSCULAR; INTRAVENOUS at 23:49

## 2022-11-24 RX ADMIN — ACETAMINOPHEN 650 MG: 325 TABLET ORAL at 14:17

## 2022-11-24 RX ADMIN — METHYLPREDNISOLONE SODIUM SUCCINATE 40 MG: 40 INJECTION, POWDER, FOR SOLUTION INTRAMUSCULAR; INTRAVENOUS at 05:08

## 2022-11-24 RX ADMIN — DULOXETINE HYDROCHLORIDE 60 MG: 30 CAPSULE, DELAYED RELEASE ORAL at 08:24

## 2022-11-24 RX ADMIN — POLYETHYLENE GLYCOL 3350 17 G: 17 POWDER, FOR SOLUTION ORAL at 16:36

## 2022-11-24 RX ADMIN — LEVOTHYROXINE SODIUM 75 MCG: 0.07 TABLET ORAL at 08:24

## 2022-11-24 RX ADMIN — ACETAMINOPHEN 650 MG: 325 TABLET ORAL at 23:49

## 2022-11-24 RX ADMIN — IPRATROPIUM BROMIDE AND ALBUTEROL SULFATE 3 ML: .5; 2.5 SOLUTION RESPIRATORY (INHALATION) at 07:41

## 2022-11-24 RX ADMIN — METOPROLOL TARTRATE 25 MG: 25 TABLET, FILM COATED ORAL at 08:24

## 2022-11-24 NOTE — PROGRESS NOTES
TRANSFER - OUT REPORT:    Verbal report given to Lance Mariano RN on Meche Rosales  being transferred to #[facility](unit) for routine progression of care       Report consisted of patients Situation, Background, Assessment and   Recommendations(SBAR). Information from the following report(s) SBAR, Kardex, ED Summary, and Cardiac Rhythm AFIB  was reviewed with the receiving nurse. Lines:   Peripheral IV 11/22/22 Right Antecubital (Active)   Site Assessment Clean, dry, & intact 11/24/22 1500   Phlebitis Assessment 0 11/24/22 1500   Infiltration Assessment 0 11/24/22 1500   Dressing Status Clean, dry, & intact 11/24/22 1500   Dressing Type Transparent 11/24/22 1500   Hub Color/Line Status Infusing 11/24/22 1500   Action Taken Open ports on tubing capped 11/24/22 1500   Alcohol Cap Used Yes 11/24/22 1500       Peripheral IV 11/22/22 Left Forearm (Active)   Site Assessment Clean, dry, & intact 11/24/22 1500   Phlebitis Assessment 0 11/24/22 1500   Infiltration Assessment 0 11/24/22 1500   Dressing Status Clean, dry, & intact 11/24/22 1500   Dressing Type Transparent 11/24/22 1500   Hub Color/Line Status Pink;Capped;Flushed 11/24/22 1500   Action Taken Open ports on tubing capped 11/24/22 1500   Alcohol Cap Used Yes 11/24/22 1500       Peripheral IV 11/23/22 Anterior;Right Forearm (Active)   Site Assessment Clean, dry, & intact 11/24/22 1500   Phlebitis Assessment 0 11/24/22 1500   Infiltration Assessment 0 11/24/22 1500   Dressing Status Clean, dry, & intact 11/24/22 1500   Dressing Type Transparent 11/24/22 1500   Hub Color/Line Status Pink 11/24/22 1500   Action Taken Open ports on tubing capped 11/24/22 1500   Alcohol Cap Used Yes 11/24/22 1500        Opportunity for questions and clarification was provided.       Patient transported with:   Registered Nurse  Tech

## 2022-11-24 NOTE — PROGRESS NOTES
Progress Note      11/24/2022 10:20 AM  NAME: Shobha Stephens   MRN:  [id number]   Admit Diagnosis: Atrial fibrillation with RVR ([facility] [address] [I48.91]  [facility] [I48.[facility]]          Assessment/Plan:   ADanitza payan  with rapid ventricular response, currently on Cardizem  mg, metoprolol 25 mg twice daily, heart rate is well controlled at rest, does increase when she moves around, patient also does feel short of breath when she does move around. Anticoagulated with apixaban. Will check TSH    History of GI bleed 5 years ago while on aspirin, discontinued, does have history of GERD, currently on Protonix, will continue. COPD, on home oxygen. Anemia, monitor hemoglobin, check stool guaiacs. Pulmonary hypertension, PA systolic pressure of 47 mmHg. Normal LV function by echo.         []       High complexity decision making was performed in this patient at high risk for decompensation with multiple organ involvement. Subjective:     Shobha Stephens denies chest pain, get short of breath on moving around discussed with RN events overnight. Review of Systems:    Symptom Y/N Comments  Symptom Y/N Comments   Fever/Chills N   Chest Pain N    Poor Appetite N   Edema N    Cough N   Abdominal Pain N    Sputum N   Joint Pain N    SOB/PEDRAZA Y   Pruritis/Rash N    Nausea/vomit N   Tolerating PT/OT Y    Diarrhea N   Tolerating Diet Y    Constipation N   Other       Could NOT obtain due to:      Objective:      Physical Exam:    Last 24hrs VS reviewed since prior progress note.  Most recent are:    Visit Vitals  /60   Pulse (!) 114   Temp 98 °F (36.7 °C)   Resp 24   Ht 5' 4\" (1.626 m)   Wt 57.6 kg (127 lb)   SpO2 92%   Breastfeeding No   BMI 21.80 kg/m²       Intake/Output Summary (Last 24 hours) at 11/24/2022 1020  Last data filed at 11/24/2022 0400  Gross per 24 hour   Intake 346.29 ml   Output 1700 ml   Net -1353.71 ml        General Appearance: Well developed, well nourished, alert; no acute distress. Ears/Nose/Mouth/Throat: Hearing grossly normal; moist mucous membranes  Neck: Supple. Chest: Lungs clear to auscultation bilaterally. Cardiovascular: Irregular rate and rhythm, S1S2 normal, no murmur. Abdomen: Soft, non-tender, bowel sounds are active. Extremities: No edema bilaterally. Skin: Warm and dry. []         Post-cath site without hematoma, bruit, tenderness, or thrill. Distal pulses intact. PMH/ reviewed - no change compared to H&P    Data Review    Telemetry:     EKG:   []  No new EKG for review    Lab Data Personally Reviewed:    Recent Labs     11/24/22 0231 11/23/22 0349   WBC 15.7* 4.8   HGB 8.0* 9.0*   HCT 24.6* 28.2*    290     No results for input(s): INR, PTP, APTT, INREXT in the last 72 hours. Recent Labs     11/24/22 0231 11/23/22 0349 11/22/22  1432   * 135* 137   K 4.0 4.1 4.8   CL 98 101 100   CO2 27 29 30   BUN 19 12 9   CREA 1.02 0.79 0.72   * 205* 92   CA 8.2* 8.4* 8.9     No results for input(s): CPK, CKNDX, TROIQ in the last 72 hours. No lab exists for component: CPKMB  No results found for: CHOL, CHOLX, CHLST, CHOLV, HDL, HDLP, LDL, LDLC, DLDLP, [place], CHHD, CHHDX    Recent Labs     11/24/22 0231 11/23/22 0349 11/22/22  1432   AP 69 80 66   TP 6.4 6.9 6.7   ALB 3.4* 3.4* 3.7   GLOB 3.0 3.5 3.0     No results for input(s): PH, PCO2, PO2 in the last 72 hours.     Medications Personally Reviewed:    Current Facility-Administered Medications   Medication Dose Route Frequency    cefTRIAXone (ROCEPHIN) 1 g in sterile water (preservative free) 10 mL IV syringe  1 g IntraVENous Q24H    doxycycline (VIBRAMYCIN) 100 mg in 0.9% sodium chloride (MBP/ADV) 100 mL MBP  100 mg IntraVENous Q12H    metoprolol tartrate (LOPRESSOR) tablet 25 mg  25 mg Oral BID    apixaban (ELIQUIS) tablet 5 mg  5 mg Oral BID    dilTIAZem ER (CARDIZEM CD) capsule 240 mg  240 mg Oral DAILY    dilTIAZem (CARDIZEM) 125 mg in 0.9% sodium chloride 125 mL ([id number])  0-15 mg/hr IntraVENous TITRATE    budesonide-formoteroL (SYMBICORT) 160-4.5 mcg/actuation HFA inhaler 2 Puff  2 Puff Inhalation BID    hydrOXYzine HCL (ATARAX) tablet 25 mg  25 mg Oral TID PRN    montelukast (SINGULAIR) tablet 10 mg  10 mg Oral DAILY    [Held by provider] nortriptyline (PAMELOR) capsule 10 mg  10 mg Oral QHS    pantoprazole (PROTONIX) tablet 40 mg  40 mg Oral DAILY    pravastatin (PRAVACHOL) tablet 40 mg  40 mg Oral DAILY    glucose chewable tablet 16 g  4 Tablet Oral PRN    glucagon (GLUCAGEN) injection 1 mg  1 mg IntraMUSCular PRN    acetaminophen (TYLENOL) tablet 650 mg  650 mg Oral Q6H PRN    Or    acetaminophen (TYLENOL) suppository 650 mg  650 mg Rectal Q6H PRN    polyethylene glycol (MIRALAX) packet 17 g  17 g Oral DAILY PRN    ondansetron (ZOFRAN ODT) tablet 4 mg  4 mg Oral Q8H PRN    Or    ondansetron (ZOFRAN) injection 4 mg  4 mg IntraVENous Q6H PRN    levothyroxine (SYNTHROID) tablet 75 mcg  75 mcg Oral ACB    DULoxetine (CYMBALTA) capsule 60 mg  60 mg Oral DAILY    gabapentin (NEURONTIN) capsule 400 mg  400 mg Oral BID    hydrOXYchloroQUINE (PLAQUENIL) tablet 200 mg  200 mg Oral DAILY WITH BREAKFAST    albuterol-ipratropium (DUO-NEB) 2.5 MG-0.5 MG/3 ML  3 mL Nebulization Q6H RT    methylPREDNISolone (PF) (SOLU-MEDROL) injection 40 mg  40 mg IntraVENous Alisa Lim MD

## 2022-11-24 NOTE — PROGRESS NOTES
Problem: Pressure Injury - Risk of  Goal: *Prevention of pressure injury  Description: Document Ismael Scale and appropriate interventions in the flowsheet. Outcome: Progressing Towards Goal  Note: Pressure Injury Interventions:  Sensory Interventions: Assess changes in LOC, Assess need for specialty bed, Discuss PT/OT consult with provider, Float heels, Keep linens dry and wrinkle-free, Minimize linen layers, Monitor skin under medical devices, Pad between skin to skin, Pressure redistribution bed/mattress (bed type), Turn and reposition approx. every two hours (pillows and wedges if needed)         Activity Interventions: Assess need for specialty bed, Increase time out of bed, Pressure redistribution bed/mattress(bed type), PT/OT evaluation    Mobility Interventions: Float heels, HOB 30 degrees or less, Assess need for specialty bed, Pressure redistribution bed/mattress (bed type), PT/OT evaluation, Turn and reposition approx.  every two hours(pillow and wedges)    Nutrition Interventions: Document food/fluid/supplement intake, Discuss nutritional consult with provider, Offer support with meals,snacks and hydration

## 2022-11-24 NOTE — PROGRESS NOTES
Pulmonary and critical care progress note       Reason for consultation:  Acute respiratory failure with hypoxia and atrial fibrillation with rapid ventricular response        Patient is a 70y.o. year old female with signal has a past medical history of chronic A. fib not on anticoagulation secondary to bleed COPD hypothyroidism hyperlipidemia chronic pain neuropathy       On nasal Cannula oxygen  As of shortness of breath when she comes out of bed to use her bedside commode. Exams to 110 to 115. Past Medical History:   Diagnosis Date    Anxiety     Arrhythmia     svt    Calculus of kidney     Chronic atrial fibrillation (HCC)     COPD     emphysema    Depression     GERD (gastroesophageal reflux disease)     Hypothyroidism     Sciatica     Thyroid disease         Past Surgical History:   Procedure Laterality Date    BREAST SURGERY PROCEDURE UNLISTED      biopsy     HX BREAST BIOPSY Right 2010    HX GYN      LEEP  for CIN2    HX ORTHOPAEDIC      left leg       Social History     Tobacco Use    Smoking status: Former     Types: Cigarettes     Quit date:      Years since quittin.9    Smokeless tobacco: Never   Substance Use Topics    Alcohol use: No        Family History   Problem Relation Age of Onset    Malignant Hyperthermia Neg Hx     Pseudocholinesterase Deficiency Neg Hx     Delayed Awakening Neg Hx     Post-op Nausea/Vomiting Neg Hx     Emergence Delirium Neg Hx     Post-op Cognitive Dysfunction Neg Hx     Other Neg Hx        Allergies   Allergen Reactions    Nsaids (Non-Steroidal Anti-Inflammatory Drug) Unknown (comments)    Sulfa (Sulfonamide Antibiotics) Rash        Prior to Admission medications    Medication Sig Start Date End Date Taking? Authorizing Provider   gabapentin (NEURONTIN) 600 mg tablet Take 600 mg by mouth three (3) times daily. Yes Provider, Historical   hydrOXYzine HCL (ATARAX) 25 mg tablet Take 25 mg by mouth three (3) times daily as needed.     Provider, Historical pantoprazole (PROTONIX) 40 mg tablet Take 40 mg by mouth daily. Provider, Historical   pravastatin (PRAVACHOL) 40 mg tablet Take 40 mg by mouth daily. 7/2/18   Provider, Historical   nortriptyline (PAMELOR) 10 mg capsule Take 10 mg by mouth nightly. Provider, Historical   montelukast (SINGULAIR) 10 mg tablet Take 10 mg by mouth daily. Provider, Historical   acetaminophen (TYLENOL EXTRA STRENGTH) 500 mg tablet Take 500 mg by mouth every six (6) hours as needed. Provider, Historical   levothyroxine (SYNTHROID) 50 mcg tablet Take 50 mcg by mouth Daily (before breakfast). Provider, Historical   dilTIAZem ER (CARDIZEM CD) 120 mg capsule Take 120 mg by mouth daily. Provider, Historical   citalopram (CELEXA) 20 mg tablet Take 20 mg by mouth daily. Provider, Historical   fluticasone-salmeterol (ADVAIR DISKUS) 250-50 mcg/Dose diskus inhaler Take 1 Puff by inhalation every twelve (12) hours. Provider, Historical   IPRATROPIUM/ALBUTEROL SULFATE (COMBIVENT IN) Take 14.7 g by inhalation four (4) times daily. 10/22/10   Provider, Historical   calcium-vitamin D (OYSTER SHELL) 500 mg(1,250mg) -200 unit per tablet Take 1 Tab by mouth two (2) times daily (with meals).     Provider, Historical         Current Facility-Administered Medications:     cefTRIAXone (ROCEPHIN) 1 g in sterile water (preservative free) 10 mL IV syringe, 1 g, IntraVENous, Q24H, Asim Flores MD, 1 g at 11/23/22 1118    doxycycline (VIBRAMYCIN) 100 mg in 0.9% sodium chloride (MBP/ADV) 100 mL MBP, 100 mg, IntraVENous, Q12H, Tyrone Olivo MD, Last Rate: 100 mL/hr at 11/23/22 2225, 100 mg at 11/23/22 2225    metoprolol tartrate (LOPRESSOR) tablet 25 mg, 25 mg, Oral, BID, Keiko Sims MD, 25 mg at 11/24/22 0124    apixaban (ELIQUIS) tablet 5 mg, 5 mg, Oral, BID, Keiko Sims MD, 5 mg at 11/24/22 1105    dilTIAZem ER (CARDIZEM CD) capsule 240 mg, 240 mg, Oral, DAILY, Keiko Sims MD, 240 mg at 11/24/22 0330 dilTIAZem (CARDIZEM) 125 mg in 0.9% sodium chloride 125 mL ([id number]), 0-15 mg/hr, IntraVENous, TITRATE, Asim Flores MD, Held at 11/23/22 1825    budesonide-formoteroL (SYMBICORT) 160-4.5 mcg/actuation HFA inhaler 2 Puff, 2 Puff, Inhalation, BID, Asim Flores MD, 2 Puff at 11/24/22 0741    hydrOXYzine HCL (ATARAX) tablet 25 mg, 25 mg, Oral, TID PRN, Salbador Flores MD    montelukast (SINGULAIR) tablet 10 mg, 10 mg, Oral, DAILY, Asim Flores MD, 10 mg at 11/24/22 8403    [Held by provider] nortriptyline (PAMELOR) capsule 10 mg, 10 mg, Oral, QHS, Salbador Flores MD    pantoprazole (PROTONIX) tablet 40 mg, 40 mg, Oral, DAILY, Asim Flores MD, 40 mg at 11/24/22 0685    pravastatin (PRAVACHOL) tablet 40 mg, 40 mg, Oral, DAILY, Asim Flores MD, 40 mg at 11/24/22 0824    glucose chewable tablet 16 g, 4 Tablet, Oral, PRN, Salbador Flores MD    glucagon (GLUCAGEN) injection 1 mg, 1 mg, IntraMUSCular, PRN, Salbador Flores MD    acetaminophen (TYLENOL) tablet 650 mg, 650 mg, Oral, Q6H PRN, 650 mg at 11/23/22 2224 **OR** acetaminophen (TYLENOL) suppository 650 mg, 650 mg, Rectal, Q6H PRN, Asim Flores MD    polyethylene glycol (MIRALAX) packet 17 g, 17 g, Oral, DAILY PRN, Asim Flores MD, 17 g at 11/23/22 1119    ondansetron (ZOFRAN ODT) tablet 4 mg, 4 mg, Oral, Q8H PRN **OR** ondansetron (ZOFRAN) injection 4 mg, 4 mg, IntraVENous, Q6H PRN, Asim Flores MD    levothyroxine (SYNTHROID) tablet 75 mcg, 75 mcg, Oral, ACB, Asim Flores MD, 75 mcg at 11/24/22 0112    DULoxetine (CYMBALTA) capsule 60 mg, 60 mg, Oral, DAILY, Asim Flores MD, 60 mg at 11/24/22 8951    gabapentin (NEURONTIN) capsule 400 mg, 400 mg, Oral, BID, Asim Flores MD, 400 mg at 11/24/22 5488    hydrOXYchloroQUINE (PLAQUENIL) tablet 200 mg, 200 mg, Oral, DAILY WITH BREAKFAST, Asim Flores MD, 200 mg at 11/24/22 0824    albuterol-ipratropium (DUO-NEB) 2.5 MG-0.5 MG/3 ML, 3 mL, Nebulization, Q6H RT, Patricia Mills MD, 3 mL at 11/24/22 0741    methylPREDNISolone (PF) (SOLU-MEDROL) injection 40 mg, 40 mg, IntraVENous, Q6H, Asim Flores MD, 40 mg at 11/24/22 0508    LAB DATA REVIEWED:    Recent Results (from the past 24 hour(s))   ECHO ADULT COMPLETE    Collection Time: 11/23/22  4:10 PM   Result Value Ref Range    LA Major [place] 6.1 cm    LA Area 4C 18.7 cm2    LA Diameter 3.8 cm    AV Mean Gradient 4 mmHg    AV VTI 26.1 cm    AV Mean Velocity 0.9 m/s    AV Peak Velocity 1.4 m/s    AV Peak Gradient 8 mmHg    Aortic Root 3.2 cm    Descending Aorta 2.1 cm    IVSd 1.3 (A) 0.6 - 0.9 cm    LVIDd 3.2 (A) 3.9 - 5.3 cm    LVIDs 2.4 cm    LVOT Mean Gradient 3 mmHg    LVOT VTI 21.9 cm    LVOT Peak Velocity 1.2 m/s    LVOT Peak Gradient 6 mmHg    LVPWd 0.8 0.6 - 0.9 cm    MR Peak Velocity 4.2 m/s    MR Peak Gradient 71 mmHg    MV Max Velocity 1.7 m/s    MV Peak Gradient 11 mmHg    MV Mean Gradient 3 mmHg    MV VTI 31.0 cm    MV Mean Velocity 0.7 m/s    Est. RA Pressure 8 mmHg    TR Max Velocity 3.14 m/s    TR Peak Gradient 39 mmHg    TAPSE 1.5 (A) 1.7 cm    Fractional Shortening 2D 25 28 - 44 %    LVIDd Index 1.99 cm/m2    LVIDs Index 1.49 cm/m2    LV RWT Ratio 0.50     LV Mass 2D 97.2 67 - 162 g    LV Mass 2D Index 60.4 43 - 95 g/m2    LA Size Index 2.36 cm/m2    LA/AO Root Ratio 1.19     Ao Root Index 1.99 cm/m2    AV Velocity Ratio 0.86     LVOT:AV VTI Index 0.84     MV:LVOT VTI Index 1.42     RVSP 47 mmHg    Descending Aorta Index 1.30 cm/m2    EF BP 49 (A) 55 - 100 %    IVC Proxmal 1.9 cm   D DIMER    Collection Time: 11/23/22  5:00 PM   Result Value Ref Range    D DIMER 0.61 (H) <0.50 ug/ml(FEU)   CBC W/O DIFF    Collection Time: 11/24/22  2:31 AM   Result Value Ref Range    WBC 15.7 (H) 3.6 - 11.0 K/uL    RBC 2.66 (L) 3.80 - 5.20 M/uL    HGB 8.0 (L) 11.5 - 16.0 g/dL    HCT 24.6 (L) 35.0 - 47.0 %    MCV 92.5 80.0 - 99.0 FL    MCH 30.1 26.0 - 34.0 PG    MCHC 32.5 30.0 - 36.5 g/dL    RDW 13.8 11.5 - 14.5 % PLATELET 298 930 - 820 K/uL    MPV 9.9 8.9 - 12.9 FL    NRBC 0.0 0.0  WBC    ABSOLUTE NRBC 0.00 0.00 - 8.11 K/uL   METABOLIC PANEL, COMPREHENSIVE    Collection Time: 11/24/22  2:31 AM   Result Value Ref Range    Sodium 131 (L) 136 - 145 mmol/L    Potassium 4.0 3.5 - 5.1 mmol/L    Chloride 98 97 - 108 mmol/L    CO2 27 21 - 32 mmol/L    Anion gap 6 5 - 15 mmol/L    Glucose 191 (H) 65 - 100 mg/dL    BUN 19 6 - 20 mg/dL    Creatinine 1.02 0.55 - 1.02 mg/dL    BUN/Creatinine ratio 19 12 - 20      eGFR 59 (L) >60 ml/min/1.73m2    Calcium 8.2 (L) 8.5 - 10.1 mg/dL    Bilirubin, total 0.3 0.2 - 1.0 mg/dL    AST (SGOT) 69 (H) 15 - 37 U/L    ALT (SGPT) 77 12 - 78 U/L    Alk. phosphatase 69 45 - 117 U/L    Protein, total 6.4 6.4 - 8.2 g/dL    Albumin 3.4 (L) 3.5 - 5.0 g/dL    Globulin 3.0 2.0 - 4.0 g/dL    A-G Ratio 1.1 1.1 - 2.2         XR Results (most recent):  Results from Hospital Encounter encounter on 11/22/22    XR CHEST PORT    Narrative  PORTABLE CHEST RADIOGRAPH/S: 11/24/2022 3:53 AM    INDICATION: Respiratory failure. COMPARISON: 11/22/2022, CT chest 11/23/2022. TECHNIQUE: Portable frontal semiupright radiograph/s of the chest.    FINDINGS:  The lungs are emphysematous, but clear. The central airways are patent. No  pneumothorax or pleural effusion. Impression  Emphysematous but clear lungs. XR CHEST PORT   Final Result   Emphysematous but clear lungs. CTA CHEST W OR W WO CONT   Final Result   1. No evidence of pulmonary embolus. 2.  Small right pleural effusion with mild right basilar airspace disease. 3. Centrilobular emphysema. 4. Cardiomegaly.             XR CHEST PORT   Final Result      No acute process on portable chest.              Review of Systems:  Review of 12 systems was performed and is noncontributory other than as outlined above    Objective:   VITALS:    Visit Vitals  /60   Pulse (!) 114   Temp 98 °F (36.7 °C)   Resp 24   Ht 5' 4\" (1.626 m)   Wt 57.6 kg (127 lb)   SpO2 92%   Breastfeeding No   BMI 21.80 kg/m²       Physical Exam:   Constitutional: pt is oriented to person, place, and time. HENT:   Head: Normocephalic and atraumatic. Eyes: Pupils are equal, round, and reactive to light. EOM are normal.   Cardiovascular: Irregularly irregular, tachycardic  Pulmonary/Chest: Reduced air entry bilaterally with prolonged exhalation, occasional wheezing, basal crackles  Exhibits no tenderness. Abdominal: Soft. Bowel sounds are normal. There is no abdominal tenderness. There is no rebound and no guarding. Musculoskeletal: Normal range of motion. Neurological: pt is alert and oriented to person, place, and time. Alert. Normal strength. No cranial nerve deficit or sensory deficit. Displays a negative Romberg sign. ASSESSMENT:    1. Acute on chronic respiratory failure with hypoxia  2. Acute exacerbation of COPD  3. Paroxysmal atrial fibrillation with rapid ventricular response  4. Left-sided pleuritic chest pain  5. Hypertension  6. Depression    Plan:    Patient admitted to the ICU  Will be watched here closely    Tenuous clinical status  Critically ill    Continue nasal cannula oxygen as needed to keep saturation above 92%    Patient in rapid A. fib  Has been complaining of left-sided pleuritic chest pain  CT angiogram of chest did not show any pulmonary embolism. Showed cardiomegaly and changes of emphysema. Acute exacerbation of COPD  Continue Solu-Medrol  Continue Rocephin  Added doxycycline for atypical coverage  Nebulizers as needed  Symbicort inhaler    Cardizem and atenolol.     placed on Lasix 40 mg IV  Check electrolytes and replace as needed  Intake and output charting  Monitor renal function with fluid changes    Continue blood pressure medications  Cymbalta 60 mg daily  Neurontin 400 mg twice a day    DVT and GI prophylaxis    Tenuous clinical status  Prognosis guarded  Monitor closely in ICU    Questions of patient were answered at bedside in detail  Case discussed in detail with RN, RT, and care team  Thank you for involving me in the care of this patient  I will follow with you closely during hospitalization    Time spent more than 50 minutes under patient care with no overlap reviewing results and records, decision making, and answering questions.     Frandy Jenkins MD  Pulmonary/CC

## 2022-11-24 NOTE — PROGRESS NOTES
General Daily Progress Note          Patient Name:   Lester Patterson       YOB: 1950       Age:  70 y.o. Admit Date: 11/22/2022      Subjective:     Patient is a 70y.o. year old female with signal past medical history of chronic A. fib not on anticoagulation secondary to bleed COPD hypothyroidism hyperlipidemia chronic pain neuropathy came to emergency room  Complaining of shortness of breath for last 2 weeks and worsening patient normally at 3 L oxygen by nasal cannula for COPD at home Emergency room seen by the ER physician work-up and showed patient on A. fib with rapid ventricular rate patient received 10 mg of IV Cardizem twice but patient still in rapid ventricular rate patient on Cardizem drip cardiology was consulted by the ER physician cardiology recommended patient to be admitted for further evaluation and treatment patient denies any fever chills has not shortness of breath no nausea no vomiting  In the ER blood work done BNP of 1300      Patient still on Cardizem drip heart rate fluctuating       11/24  Patient is resting comfortably in bed and has no complaints. States breathing treatments help. D Dimer 0.61  Elevated WBC count 15.7K      ECHO results    Left Ventricle: Normal left ventricular systolic function with a visually estimated EF of 50 - 55%. Left ventricle is smaller than normal. Mild septal thickening. Normal wall motion. Mitral Valve: Mild regurgitation. Tricuspid Valve: The estimated RVSP is 47 mmHg.     Objective:     Visit Vitals  /60   Pulse (!) 114   Temp 98 °F (36.7 °C)   Resp 24   Ht 5' 4\" (1.626 m)   Wt 57.6 kg (127 lb)   SpO2 92%   Breastfeeding No   BMI 21.80 kg/m²        Recent Results (from the past 24 hour(s))   ECHO ADULT COMPLETE    Collection Time: 11/23/22  4:10 PM   Result Value Ref Range    LA Major [place] 6.1 cm    LA Area 4C 18.7 cm2    LA Diameter 3.8 cm    AV Mean Gradient 4 mmHg    AV VTI 26.1 cm    AV Mean Velocity 0.9 m/s    AV Peak Velocity 1.4 m/s    AV Peak Gradient 8 mmHg    Aortic Root 3.2 cm    Descending Aorta 2.1 cm    IVSd 1.3 (A) 0.6 - 0.9 cm    LVIDd 3.2 (A) 3.9 - 5.3 cm    LVIDs 2.4 cm    LVOT Mean Gradient 3 mmHg    LVOT VTI 21.9 cm    LVOT Peak Velocity 1.2 m/s    LVOT Peak Gradient 6 mmHg    LVPWd 0.8 0.6 - 0.9 cm    MR Peak Velocity 4.2 m/s    MR Peak Gradient 71 mmHg    MV Max Velocity 1.7 m/s    MV Peak Gradient 11 mmHg    MV Mean Gradient 3 mmHg    MV VTI 31.0 cm    MV Mean Velocity 0.7 m/s    Est. RA Pressure 8 mmHg    TR Max Velocity 3.14 m/s    TR Peak Gradient 39 mmHg    TAPSE 1.5 (A) 1.7 cm    Fractional Shortening 2D 25 28 - 44 %    LVIDd Index 1.99 cm/m2    LVIDs Index 1.49 cm/m2    LV RWT Ratio 0.50     LV Mass 2D 97.2 67 - 162 g    LV Mass 2D Index 60.4 43 - 95 g/m2    LA Size Index 2.36 cm/m2    LA/AO Root Ratio 1.19     Ao Root Index 1.99 cm/m2    AV Velocity Ratio 0.86     LVOT:AV VTI Index 0.84     MV:LVOT VTI Index 1.42     RVSP 47 mmHg    Descending Aorta Index 1.30 cm/m2    EF BP 49 (A) 55 - 100 %    IVC Proxmal 1.9 cm   D DIMER    Collection Time: 11/23/22  5:00 PM   Result Value Ref Range    D DIMER 0.61 (H) <0.50 ug/ml(FEU)   CBC W/O DIFF    Collection Time: 11/24/22  2:31 AM   Result Value Ref Range    WBC 15.7 (H) 3.6 - 11.0 K/uL    RBC 2.66 (L) 3.80 - 5.20 M/uL    HGB 8.0 (L) 11.5 - 16.0 g/dL    HCT 24.6 (L) 35.0 - 47.0 %    MCV 92.5 80.0 - 99.0 FL    MCH 30.1 26.0 - 34.0 PG    MCHC 32.5 30.0 - 36.5 g/dL    RDW 13.8 11.5 - 14.5 %    PLATELET 231 451 - 358 K/uL    MPV 9.9 8.9 - 12.9 FL    NRBC 0.0 0.0  WBC    ABSOLUTE NRBC 0.00 0.00 - 4.73 K/uL   METABOLIC PANEL, COMPREHENSIVE    Collection Time: 11/24/22  2:31 AM   Result Value Ref Range    Sodium 131 (L) 136 - 145 mmol/L    Potassium 4.0 3.5 - 5.1 mmol/L    Chloride 98 97 - 108 mmol/L    CO2 27 21 - 32 mmol/L    Anion gap 6 5 - 15 mmol/L    Glucose 191 (H) 65 - 100 mg/dL    BUN 19 6 - 20 mg/dL    Creatinine 1.02 0.55 - 1.02 mg/dL BUN/Creatinine ratio 19 12 - 20      eGFR 59 (L) >60 ml/min/1.73m2    Calcium 8.2 (L) 8.5 - 10.1 mg/dL    Bilirubin, total 0.3 0.2 - 1.0 mg/dL    AST (SGOT) 69 (H) 15 - 37 U/L    ALT (SGPT) 77 12 - 78 U/L    Alk. phosphatase 69 45 - 117 U/L    Protein, total 6.4 6.4 - 8.2 g/dL    Albumin 3.4 (L) 3.5 - 5.0 g/dL    Globulin 3.0 2.0 - 4.0 g/dL    A-G Ratio 1.1 1.1 - 2.2       [unfilled]      Review of Systems    Constitutional: Negative for chills and fever. HENT: Negative. Eyes: Negative. Respiratory: Negative. Cardiovascular: Negative. Gastrointestinal: Negative for abdominal pain and nausea. Skin: Negative. Neurological: Negative. Physical Exam:      Constitutional: pt is oriented to person, place, and time. HENT:   Head: Normocephalic and atraumatic. Eyes: Pupils are equal, round, and reactive to light. EOM are normal.   Cardiovascular: Normal rate, regular rhythm and normal heart sounds. Pulmonary/Chest: Breath sounds normal. No wheezes. No rales. Exhibits no tenderness. Abdominal: Soft. Bowel sounds are normal. There is no abdominal tenderness. There is no rebound and no guarding. Musculoskeletal: Normal range of motion. Neurological: pt is alert and oriented to person, place, and time. XR CHEST PORT   Final Result   Emphysematous but clear lungs. CTA CHEST W OR W WO CONT   Final Result   1. No evidence of pulmonary embolus. 2.  Small right pleural effusion with mild right basilar airspace disease. 3. Centrilobular emphysema. 4. Cardiomegaly.             XR CHEST PORT   Final Result      No acute process on portable chest.              Recent Results (from the past 24 hour(s))   ECHO ADULT COMPLETE    Collection Time: 11/23/22  4:10 PM   Result Value Ref Range    LA Major [place] 6.1 cm    LA Area 4C 18.7 cm2    LA Diameter 3.8 cm    AV Mean Gradient 4 mmHg    AV VTI 26.1 cm    AV Mean Velocity 0.9 m/s    AV Peak Velocity 1.4 m/s    AV Peak Gradient 8 mmHg Aortic Root 3.2 cm    Descending Aorta 2.1 cm    IVSd 1.3 (A) 0.6 - 0.9 cm    LVIDd 3.2 (A) 3.9 - 5.3 cm    LVIDs 2.4 cm    LVOT Mean Gradient 3 mmHg    LVOT VTI 21.9 cm    LVOT Peak Velocity 1.2 m/s    LVOT Peak Gradient 6 mmHg    LVPWd 0.8 0.6 - 0.9 cm    MR Peak Velocity 4.2 m/s    MR Peak Gradient 71 mmHg    MV Max Velocity 1.7 m/s    MV Peak Gradient 11 mmHg    MV Mean Gradient 3 mmHg    MV VTI 31.0 cm    MV Mean Velocity 0.7 m/s    Est. RA Pressure 8 mmHg    TR Max Velocity 3.14 m/s    TR Peak Gradient 39 mmHg    TAPSE 1.5 (A) 1.7 cm    Fractional Shortening 2D 25 28 - 44 %    LVIDd Index 1.99 cm/m2    LVIDs Index 1.49 cm/m2    LV RWT Ratio 0.50     LV Mass 2D 97.2 67 - 162 g    LV Mass 2D Index 60.4 43 - 95 g/m2    LA Size Index 2.36 cm/m2    LA/AO Root Ratio 1.19     Ao Root Index 1.99 cm/m2    AV Velocity Ratio 0.86     LVOT:AV VTI Index 0.84     MV:LVOT VTI Index 1.42     RVSP 47 mmHg    Descending Aorta Index 1.30 cm/m2    EF BP 49 (A) 55 - 100 %    IVC Proxmal 1.9 cm   D DIMER    Collection Time: 11/23/22  5:00 PM   Result Value Ref Range    D DIMER 0.61 (H) <0.50 ug/ml(FEU)   CBC W/O DIFF    Collection Time: 11/24/22  2:31 AM   Result Value Ref Range    WBC 15.7 (H) 3.6 - 11.0 K/uL    RBC 2.66 (L) 3.80 - 5.20 M/uL    HGB 8.0 (L) 11.5 - 16.0 g/dL    HCT 24.6 (L) 35.0 - 47.0 %    MCV 92.5 80.0 - 99.0 FL    MCH 30.1 26.0 - 34.0 PG    MCHC 32.5 30.0 - 36.5 g/dL    RDW 13.8 11.5 - 14.5 %    PLATELET 775 050 - 951 K/uL    MPV 9.9 8.9 - 12.9 FL    NRBC 0.0 0.0  WBC    ABSOLUTE NRBC 0.00 0.00 - 1.63 K/uL   METABOLIC PANEL, COMPREHENSIVE    Collection Time: 11/24/22  2:31 AM   Result Value Ref Range    Sodium 131 (L) 136 - 145 mmol/L    Potassium 4.0 3.5 - 5.1 mmol/L    Chloride 98 97 - 108 mmol/L    CO2 27 21 - 32 mmol/L    Anion gap 6 5 - 15 mmol/L    Glucose 191 (H) 65 - 100 mg/dL    BUN 19 6 - 20 mg/dL    Creatinine 1.02 0.55 - 1.02 mg/dL    BUN/Creatinine ratio 19 12 - 20      eGFR 59 (L) >60 ml/min/1.73m2    Calcium 8.2 (L) 8.5 - 10.1 mg/dL    Bilirubin, total 0.3 0.2 - 1.0 mg/dL    AST (SGOT) 69 (H) 15 - 37 U/L    ALT (SGPT) 77 12 - 78 U/L    Alk. phosphatase 69 45 - 117 U/L    Protein, total 6.4 6.4 - 8.2 g/dL    Albumin 3.4 (L) 3.5 - 5.0 g/dL    Globulin 3.0 2.0 - 4.0 g/dL    A-G Ratio 1.1 1.1 - 2.2         Results       Procedure Component Value Units Date/Time    CULTURE, URINE [[id number]] Collected: 11/23/22 1555    Order Status: Sent Specimen: Urine Updated: 11/23/22 1612    CULTURE, BLOOD, PAIRED [[id number]]     Order Status: Canceled Specimen: Blood     COVID-19 RAPID TEST [[id number]]     Order Status: Canceled     INFLUENZA A & B AG (RAPID TEST) [[id number]]     Order Status: Canceled Specimen: Nasopharyngeal              Labs:     Recent Labs     11/24/22  0231 11/23/22 0349   WBC 15.7* 4.8   HGB 8.0* 9.0*   HCT 24.6* 28.2*    290       Recent Labs     11/24/22  0231 11/23/22  0349 11/22/22  1432   * 135* 137   K 4.0 4.1 4.8   CL 98 101 100   CO2 27 29 30   BUN 19 12 9   CREA 1.02 0.79 0.72   * 205* 92   CA 8.2* 8.4* 8.9       Recent Labs     11/24/22  0231 11/23/22  0349 11/22/22  1432   ALT 77 39 19   AP 69 80 66   TBILI 0.3 0.3 0.4   TP 6.4 6.9 6.7   ALB 3.4* 3.4* 3.7   GLOB 3.0 3.5 3.0       No results for input(s): INR, PTP, APTT, INREXT, INREXT in the last 72 hours. No results for input(s): FE, TIBC, PSAT, FERR in the last 72 hours. Lab Results   Component Value Date/Time    Folate 12.3 07/12/2018 08:10 AM        No results for input(s): PH, PCO2, PO2 in the last 72 hours. No results for input(s): CPK, CKNDX, TROIQ in the last 72 hours.     No lab exists for component: CPKMB  No results found for: CHOL, CHOLX, CHLST, CHOLV, HDL, HDLP, LDL, LDLC, DLDLP, TGLX, TRIGL, TRIGP, CHHD, CHHDX  No results found for: [facility]  Lab Results   Component Value Date/Time    Color Yellow/Straw 11/22/2022 04:12 PM    Appearance Clear 11/22/2022 04:12 PM    Specific gravity 1.005 11/22/2022 04:12 PM    pH (UA) 7.0 11/22/2022 04:12 PM    Protein Negative 11/22/2022 04:12 PM    Glucose Negative 11/22/2022 04:12 PM    Ketone Negative 11/22/2022 04:12 PM    Bilirubin Negative 11/22/2022 04:12 PM    Urobilinogen 0.1 11/22/2022 04:12 PM    Nitrites Negative 11/22/2022 04:12 PM    Leukocyte Esterase Moderate (A) 11/22/2022 04:12 PM    Bacteria Negative 11/22/2022 04:12 PM    Bacteria Negative 11/22/2022 04:12 PM    WBC 20-50 11/22/2022 04:12 PM    WBC 20-50 11/22/2022 04:12 PM    RBC 0-5 11/22/2022 04:12 PM    RBC 0-5 11/22/2022 04:12 PM         Assessment:     A. fib with rapid ventricular rate  COPD  Chronic hypoxic respiratory failure on 3 L  Hypertension  Hypothyroidism  Depression  Neuropathy  UTI - urine culture pending   Elevated WBC count      Plan:     Per cardiology will start to wean off of Cardizem drip as heart rate allows, give digoxin load and start PO diltiazem and PO metoprolol   COPD follow-up with pulmonologist  Continue other home medications  Continue Lasix 40 mg  Continue Solumedrol, nebulizers as needed  Continue rocephin and doxycycline     Continue to monitor heart rate        Current Facility-Administered Medications:     cefTRIAXone (ROCEPHIN) 1 g in sterile water (preservative free) 10 mL IV syringe, 1 g, IntraVENous, Q24H, Asim Flores MD, 1 g at 11/23/22 1118    doxycycline (VIBRAMYCIN) 100 mg in 0.9% sodium chloride (MBP/ADV) 100 mL MBP, 100 mg, IntraVENous, Q12H, Tyrone Olivo MD, Last Rate: 100 mL/hr at 11/23/22 2225, 100 mg at 11/23/22 2225    metoprolol tartrate (LOPRESSOR) tablet 25 mg, 25 mg, Oral, BID, Dustin Burrell MD, 25 mg at 11/24/22 2339    apixaban (ELIQUIS) tablet 5 mg, 5 mg, Oral, BID, Dustin Burrell MD, 5 mg at 11/24/22 3934    dilTIAZem ER (CARDIZEM CD) capsule 240 mg, 240 mg, Oral, DAILY, Dustin Burrell MD, 240 mg at 11/24/22 0824    dilTIAZem (CARDIZEM) 125 mg in 0.9% sodium chloride 125 mL ([id number]), 0-15 mg/hr, IntraVENous, TITRATE, Fred Flores MD, Held at 11/23/22 1825    budesonide-formoteroL (SYMBICORT) 160-4.5 mcg/actuation HFA inhaler 2 Puff, 2 Puff, Inhalation, BID, Asim Flores MD, 2 Puff at 11/24/22 0741    hydrOXYzine HCL (ATARAX) tablet 25 mg, 25 mg, Oral, TID PRN, Fred Flores MD    montelukast (SINGULAIR) tablet 10 mg, 10 mg, Oral, DAILY, Asim Flores MD, 10 mg at 11/24/22 5599    [Held by provider] nortriptyline (PAMELOR) capsule 10 mg, 10 mg, Oral, QHS, Fred Flores MD    pantoprazole (PROTONIX) tablet 40 mg, 40 mg, Oral, DAILY, Asim Flores MD, 40 mg at 11/24/22 8154    pravastatin (PRAVACHOL) tablet 40 mg, 40 mg, Oral, DAILY, Asim Flores MD, 40 mg at 11/24/22 0824    glucose chewable tablet 16 g, 4 Tablet, Oral, PRN, Fred Flores MD    glucagon (GLUCAGEN) injection 1 mg, 1 mg, IntraMUSCular, PRN, Fred Flores MD    acetaminophen (TYLENOL) tablet 650 mg, 650 mg, Oral, Q6H PRN, 650 mg at 11/23/22 2224 **OR** acetaminophen (TYLENOL) suppository 650 mg, 650 mg, Rectal, Q6H PRN, Familia Smith MD    polyethylene glycol (MIRALAX) packet 17 g, 17 g, Oral, DAILY PRN, Familia Smith MD, 17 g at 11/23/22 1119    ondansetron (ZOFRAN ODT) tablet 4 mg, 4 mg, Oral, Q8H PRN **OR** ondansetron (ZOFRAN) injection 4 mg, 4 mg, IntraVENous, Q6H PRN, Asim Flores MD    levothyroxine (SYNTHROID) tablet 75 mcg, 75 mcg, Oral, ACB, Asim Flores MD, 75 mcg at 11/24/22 8594    DULoxetine (CYMBALTA) capsule 60 mg, 60 mg, Oral, DAILY, Familia Smith MD, 60 mg at 11/24/22 1668    gabapentin (NEURONTIN) capsule 400 mg, 400 mg, Oral, BID, Asim Flores MD, 400 mg at 11/24/22 8291    hydrOXYchloroQUINE (PLAQUENIL) tablet 200 mg, 200 mg, Oral, DAILY WITH BREAKFAST, Familia Smith MD, 200 mg at 11/24/22 0824    albuterol-ipratropium (DUO-NEB) 2.5 MG-0.5 MG/3 ML, 3 mL, Nebulization, Q6H RT, Asim Flores MD, 3 mL at 11/24/22 0741    methylPREDNISolone (PF) (SOLU-MEDROL) injection 40 mg, 40 mg, IntraVENous, Q6H, Asim Flores MD, 40 mg at 11/24/22 1654

## 2022-11-24 NOTE — PROGRESS NOTES
General Daily Progress Note          Patient Name:   Cyril Cummins       YOB: 1950       Age:  70 y.o. Admit Date: 11/22/2022      Subjective:     Patient is a 70y.o. year old female with signal past medical history of chronic A. fib not on anticoagulation secondary to bleed COPD hypothyroidism hyperlipidemia chronic pain neuropathy came to emergency room  Complaining of shortness of breath for last 2 weeks and worsening patient normally at 3 L oxygen by nasal cannula for COPD at home Emergency room seen by the ER physician work-up and showed patient on A. fib with rapid ventricular rate patient received 10 mg of IV Cardizem twice but patient still in rapid ventricular rate patient on Cardizem drip cardiology was consulted by the ER physician cardiology recommended patient to be admitted for further evaluation and treatment patient denies any fever chills has not shortness of breath no nausea no vomiting  In the ER blood work done BNP of 1300      Patient still on Cardizem drip heart rate fluctuating       11/24  Patient is resting comfortably in bed and has no complaints. States breathing treatments help. D Dimer 0.61  Elevated WBC count 15.7K      ECHO results    Left Ventricle: Normal left ventricular systolic function with a visually estimated EF of 50 - 55%. Left ventricle is smaller than normal. Mild septal thickening. Normal wall motion. Mitral Valve: Mild regurgitation. Tricuspid Valve: The estimated RVSP is 47 mmHg.     Objective:     Visit Vitals  /67   Pulse 98   Temp 98.6 °F (37 °C)   Resp 24   Ht 5' 4\" (1.626 m)   Wt 57.6 kg (127 lb)   SpO2 96%   Breastfeeding No   BMI 21.80 kg/m²        Recent Results (from the past 24 hour(s))   ECHO ADULT COMPLETE    Collection Time: 11/23/22  4:10 PM   Result Value Ref Range    LA Major [place] 6.1 cm    LA Area 4C 18.7 cm2    LA Diameter 3.8 cm    AV Mean Gradient 4 mmHg    AV VTI 26.1 cm    AV Mean Velocity 0.9 m/s    AV Peak Velocity 1.4 m/s    AV Peak Gradient 8 mmHg    Aortic Root 3.2 cm    Descending Aorta 2.1 cm    IVSd 1.3 (A) 0.6 - 0.9 cm    LVIDd 3.2 (A) 3.9 - 5.3 cm    LVIDs 2.4 cm    LVOT Mean Gradient 3 mmHg    LVOT VTI 21.9 cm    LVOT Peak Velocity 1.2 m/s    LVOT Peak Gradient 6 mmHg    LVPWd 0.8 0.6 - 0.9 cm    MR Peak Velocity 4.2 m/s    MR Peak Gradient 71 mmHg    MV Max Velocity 1.7 m/s    MV Peak Gradient 11 mmHg    MV Mean Gradient 3 mmHg    MV VTI 31.0 cm    MV Mean Velocity 0.7 m/s    Est. RA Pressure 8 mmHg    TR Max Velocity 3.14 m/s    TR Peak Gradient 39 mmHg    TAPSE 1.5 (A) 1.7 cm    Fractional Shortening 2D 25 28 - 44 %    LVIDd Index 1.99 cm/m2    LVIDs Index 1.49 cm/m2    LV RWT Ratio 0.50     LV Mass 2D 97.2 67 - 162 g    LV Mass 2D Index 60.4 43 - 95 g/m2    LA Size Index 2.36 cm/m2    LA/AO Root Ratio 1.19     Ao Root Index 1.99 cm/m2    AV Velocity Ratio 0.86     LVOT:AV VTI Index 0.84     MV:LVOT VTI Index 1.42     RVSP 47 mmHg    Descending Aorta Index 1.30 cm/m2    EF BP 49 (A) 55 - 100 %    IVC Proxmal 1.9 cm   D DIMER    Collection Time: 11/23/22  5:00 PM   Result Value Ref Range    D DIMER 0.61 (H) <0.50 ug/ml(FEU)   CBC W/O DIFF    Collection Time: 11/24/22  2:31 AM   Result Value Ref Range    WBC 15.7 (H) 3.6 - 11.0 K/uL    RBC 2.66 (L) 3.80 - 5.20 M/uL    HGB 8.0 (L) 11.5 - 16.0 g/dL    HCT 24.6 (L) 35.0 - 47.0 %    MCV 92.5 80.0 - 99.0 FL    MCH 30.1 26.0 - 34.0 PG    MCHC 32.5 30.0 - 36.5 g/dL    RDW 13.8 11.5 - 14.5 %    PLATELET 983 690 - 618 K/uL    MPV 9.9 8.9 - 12.9 FL    NRBC 0.0 0.0  WBC    ABSOLUTE NRBC 0.00 0.00 - 0.41 K/uL   METABOLIC PANEL, COMPREHENSIVE    Collection Time: 11/24/22  2:31 AM   Result Value Ref Range    Sodium 131 (L) 136 - 145 mmol/L    Potassium 4.0 3.5 - 5.1 mmol/L    Chloride 98 97 - 108 mmol/L    CO2 27 21 - 32 mmol/L    Anion gap 6 5 - 15 mmol/L    Glucose 191 (H) 65 - 100 mg/dL    BUN 19 6 - 20 mg/dL    Creatinine 1.02 0.55 - 1.02 mg/dL BUN/Creatinine ratio 19 12 - 20      eGFR 59 (L) >60 ml/min/1.73m2    Calcium 8.2 (L) 8.5 - 10.1 mg/dL    Bilirubin, total 0.3 0.2 - 1.0 mg/dL    AST (SGOT) 69 (H) 15 - 37 U/L    ALT (SGPT) 77 12 - 78 U/L    Alk. phosphatase 69 45 - 117 U/L    Protein, total 6.4 6.4 - 8.2 g/dL    Albumin 3.4 (L) 3.5 - 5.0 g/dL    Globulin 3.0 2.0 - 4.0 g/dL    A-G Ratio 1.1 1.1 - 2.2       [unfilled]      Review of Systems    Constitutional: Negative for chills and fever. HENT: Negative. Eyes: Negative. Respiratory: Negative. Cardiovascular: Negative. Gastrointestinal: Negative for abdominal pain and nausea. Skin: Negative. Neurological: Negative. Physical Exam:      Constitutional: pt is oriented to person, place, and time. HENT:   Head: Normocephalic and atraumatic. Eyes: Pupils are equal, round, and reactive to light. EOM are normal.   Cardiovascular: Normal rate, regular rhythm and normal heart sounds. Pulmonary/Chest: Breath sounds normal. No wheezes. No rales. Exhibits no tenderness. Abdominal: Soft. Bowel sounds are normal. There is no abdominal tenderness. There is no rebound and no guarding. Musculoskeletal: Normal range of motion. Neurological: pt is alert and oriented to person, place, and time. CTA CHEST W OR W WO CONT   Final Result   1. No evidence of pulmonary embolus. 2.  Small right pleural effusion with mild right basilar airspace disease. 3. Centrilobular emphysema. 4. Cardiomegaly.             XR CHEST PORT   Final Result      No acute process on portable chest.         XR CHEST PORT    (Results Pending)        Recent Results (from the past 24 hour(s))   ECHO ADULT COMPLETE    Collection Time: 11/23/22  4:10 PM   Result Value Ref Range    LA Major [place] 6.1 cm    LA Area 4C 18.7 cm2    LA Diameter 3.8 cm    AV Mean Gradient 4 mmHg    AV VTI 26.1 cm    AV Mean Velocity 0.9 m/s    AV Peak Velocity 1.4 m/s    AV Peak Gradient 8 mmHg    Aortic Root 3.2 cm Descending Aorta 2.1 cm    IVSd 1.3 (A) 0.6 - 0.9 cm    LVIDd 3.2 (A) 3.9 - 5.3 cm    LVIDs 2.4 cm    LVOT Mean Gradient 3 mmHg    LVOT VTI 21.9 cm    LVOT Peak Velocity 1.2 m/s    LVOT Peak Gradient 6 mmHg    LVPWd 0.8 0.6 - 0.9 cm    MR Peak Velocity 4.2 m/s    MR Peak Gradient 71 mmHg    MV Max Velocity 1.7 m/s    MV Peak Gradient 11 mmHg    MV Mean Gradient 3 mmHg    MV VTI 31.0 cm    MV Mean Velocity 0.7 m/s    Est. RA Pressure 8 mmHg    TR Max Velocity 3.14 m/s    TR Peak Gradient 39 mmHg    TAPSE 1.5 (A) 1.7 cm    Fractional Shortening 2D 25 28 - 44 %    LVIDd Index 1.99 cm/m2    LVIDs Index 1.49 cm/m2    LV RWT Ratio 0.50     LV Mass 2D 97.2 67 - 162 g    LV Mass 2D Index 60.4 43 - 95 g/m2    LA Size Index 2.36 cm/m2    LA/AO Root Ratio 1.19     Ao Root Index 1.99 cm/m2    AV Velocity Ratio 0.86     LVOT:AV VTI Index 0.84     MV:LVOT VTI Index 1.42     RVSP 47 mmHg    Descending Aorta Index 1.30 cm/m2    EF BP 49 (A) 55 - 100 %    IVC Proxmal 1.9 cm   D DIMER    Collection Time: 11/23/22  5:00 PM   Result Value Ref Range    D DIMER 0.61 (H) <0.50 ug/ml(FEU)   CBC W/O DIFF    Collection Time: 11/24/22  2:31 AM   Result Value Ref Range    WBC 15.7 (H) 3.6 - 11.0 K/uL    RBC 2.66 (L) 3.80 - 5.20 M/uL    HGB 8.0 (L) 11.5 - 16.0 g/dL    HCT 24.6 (L) 35.0 - 47.0 %    MCV 92.5 80.0 - 99.0 FL    MCH 30.1 26.0 - 34.0 PG    MCHC 32.5 30.0 - 36.5 g/dL    RDW 13.8 11.5 - 14.5 %    PLATELET 392 161 - 327 K/uL    MPV 9.9 8.9 - 12.9 FL    NRBC 0.0 0.0  WBC    ABSOLUTE NRBC 0.00 0.00 - 2.97 K/uL   METABOLIC PANEL, COMPREHENSIVE    Collection Time: 11/24/22  2:31 AM   Result Value Ref Range    Sodium 131 (L) 136 - 145 mmol/L    Potassium 4.0 3.5 - 5.1 mmol/L    Chloride 98 97 - 108 mmol/L    CO2 27 21 - 32 mmol/L    Anion gap 6 5 - 15 mmol/L    Glucose 191 (H) 65 - 100 mg/dL    BUN 19 6 - 20 mg/dL    Creatinine 1.02 0.55 - 1.02 mg/dL    BUN/Creatinine ratio 19 12 - 20      eGFR 59 (L) >60 ml/min/1.73m2    Calcium 8.2 (L) 8.5 - 10.1 mg/dL    Bilirubin, total 0.3 0.2 - 1.0 mg/dL    AST (SGOT) 69 (H) 15 - 37 U/L    ALT (SGPT) 77 12 - 78 U/L    Alk. phosphatase 69 45 - 117 U/L    Protein, total 6.4 6.4 - 8.2 g/dL    Albumin 3.4 (L) 3.5 - 5.0 g/dL    Globulin 3.0 2.0 - 4.0 g/dL    A-G Ratio 1.1 1.1 - 2.2         Results       Procedure Component Value Units Date/Time    CULTURE, URINE [[id number]] Collected: 11/23/22 1555    Order Status: Sent Specimen: Urine Updated: 11/23/22 1612    CULTURE, BLOOD, PAIRED [[id number]]     Order Status: Canceled Specimen: Blood     COVID-19 RAPID TEST [[id number]]     Order Status: Canceled     INFLUENZA A & B AG (RAPID TEST) [[id number]]     Order Status: Canceled Specimen: Nasopharyngeal              Labs:     Recent Labs     11/24/22 0231 11/23/22 0349   WBC 15.7* 4.8   HGB 8.0* 9.0*   HCT 24.6* 28.2*    290       Recent Labs     11/24/22  0231 11/23/22 0349 11/22/22  1432   * 135* 137   K 4.0 4.1 4.8   CL 98 101 100   CO2 27 29 30   BUN 19 12 9   CREA 1.02 0.79 0.72   * 205* 92   CA 8.2* 8.4* 8.9       Recent Labs     11/24/22  0231 11/23/22  0349 11/22/22  1432   ALT 77 39 19   AP 69 80 66   TBILI 0.3 0.3 0.4   TP 6.4 6.9 6.7   ALB 3.4* 3.4* 3.7   GLOB 3.0 3.5 3.0       No results for input(s): INR, PTP, APTT, INREXT, INREXT in the last 72 hours. No results for input(s): FE, TIBC, PSAT, FERR in the last 72 hours. Lab Results   Component Value Date/Time    Folate 12.3 07/12/2018 08:10 AM        No results for input(s): PH, PCO2, PO2 in the last 72 hours. No results for input(s): CPK, CKNDX, TROIQ in the last 72 hours.     No lab exists for component: CPKMB  No results found for: CHOL, CHOLX, CHLST, CHOLV, HDL, HDLP, LDL, LDLC, DLDLP, TGLX, TRIGL, TRIGP, CHHD, CHHDX  No results found for: [facility]  Lab Results   Component Value Date/Time    Color Yellow/Straw 11/22/2022 04:12 PM    Appearance Clear 11/22/2022 04:12 PM    Specific gravity 1.005 11/22/2022 04:12 PM    pH (UA) 7.0 11/22/2022 04:12 PM    Protein Negative 11/22/2022 04:12 PM    Glucose Negative 11/22/2022 04:12 PM    Ketone Negative 11/22/2022 04:12 PM    Bilirubin Negative 11/22/2022 04:12 PM    Urobilinogen 0.1 11/22/2022 04:12 PM    Nitrites Negative 11/22/2022 04:12 PM    Leukocyte Esterase Moderate (A) 11/22/2022 04:12 PM    Bacteria Negative 11/22/2022 04:12 PM    Bacteria Negative 11/22/2022 04:12 PM    WBC 20-50 11/22/2022 04:12 PM    WBC 20-50 11/22/2022 04:12 PM    RBC 0-5 11/22/2022 04:12 PM    RBC 0-5 11/22/2022 04:12 PM         Assessment:     A. fib with rapid ventricular rate  COPD  Chronic hypoxic respiratory failure on 3 L  Hypertension  Hypothyroidism  Depression  Neuropathy  UTI - urine culture pending   Elevated WBC count      Plan:     Per cardiology will start to wean off of Cardizem drip as heart rate allows, give digoxin load and start PO diltiazem and PO metoprolol   COPD follow-up with pulmonologist  Continue other home medications  Continue Lasix 40 mg  Continue Solumedrol, nebulizers as needed  Continue rocephin and doxycycline         Current Facility-Administered Medications:     cefTRIAXone (ROCEPHIN) 1 g in sterile water (preservative free) 10 mL IV syringe, 1 g, IntraVENous, Q24H, Asim Flores MD, 1 g at 11/23/22 1118    doxycycline (VIBRAMYCIN) 100 mg in 0.9% sodium chloride (MBP/ADV) 100 mL MBP, 100 mg, IntraVENous, Q12H, Tyrone Olivo MD, Last Rate: 100 mL/hr at 11/23/22 2225, 100 mg at 11/23/22 2225    metoprolol tartrate (LOPRESSOR) tablet 25 mg, 25 mg, Oral, BID, Baron Nathan MD, 25 mg at 11/23/22 1432    apixaban (ELIQUIS) tablet 5 mg, 5 mg, Oral, BID, Baron Nathan MD, 5 mg at 11/23/22 2038    dilTIAZem ER (CARDIZEM CD) capsule 240 mg, 240 mg, Oral, DAILY, Baron Nathan MD, 240 mg at 11/23/22 1432    dilTIAZem (CARDIZEM) 125 mg in 0.9% sodium chloride 125 mL ([id number]), 0-15 mg/hr, IntraVENous, TITRATE, Rafael Flores MD, Held at 11/23/22 3481 budesonide-formoteroL (SYMBICORT) 160-4.5 mcg/actuation HFA inhaler 2 Puff, 2 Puff, Inhalation, BID, Asim Flores MD, 2 Puff at 11/23/22 2011    hydrOXYzine HCL (ATARAX) tablet 25 mg, 25 mg, Oral, TID PRN, Bryant Flores MD    montelukast (SINGULAIR) tablet 10 mg, 10 mg, Oral, DAILY, Asim Flores MD, 10 mg at 11/23/22 0816    [Held by provider] nortriptyline (PAMELOR) capsule 10 mg, 10 mg, Oral, QHS, Bryant Flores MD    pantoprazole (PROTONIX) tablet 40 mg, 40 mg, Oral, DAILY, Asim Flores MD, 40 mg at 11/23/22 0817    pravastatin (PRAVACHOL) tablet 40 mg, 40 mg, Oral, DAILY, Alejandro Wood MD, 40 mg at 11/23/22 0817    glucose chewable tablet 16 g, 4 Tablet, Oral, PRN, Bryant Flores MD    glucagon (GLUCAGEN) injection 1 mg, 1 mg, IntraMUSCular, PRN, Bryant Flores MD    acetaminophen (TYLENOL) tablet 650 mg, 650 mg, Oral, Q6H PRN, 650 mg at 11/23/22 2224 **OR** acetaminophen (TYLENOL) suppository 650 mg, 650 mg, Rectal, Q6H PRN, Alejandro Wood MD    polyethylene glycol (MIRALAX) packet 17 g, 17 g, Oral, DAILY PRN, Alejandro Wood MD, 17 g at 11/23/22 1119    ondansetron (ZOFRAN ODT) tablet 4 mg, 4 mg, Oral, Q8H PRN **OR** ondansetron (ZOFRAN) injection 4 mg, 4 mg, IntraVENous, Q6H PRN, Asim Flores MD    levothyroxine (SYNTHROID) tablet 75 mcg, 75 mcg, Oral, ACB, Asim Flores MD, 75 mcg at 11/23/22 2907    DULoxetine (CYMBALTA) capsule 60 mg, 60 mg, Oral, DAILY, Asim Flores MD, 60 mg at 11/23/22 0817    gabapentin (NEURONTIN) capsule 400 mg, 400 mg, Oral, BID, Asim Flores MD, 400 mg at 11/23/22 2038    hydrOXYchloroQUINE (PLAQUENIL) tablet 200 mg, 200 mg, Oral, DAILY WITH BREAKFAST, Alejandro Wood MD, 200 mg at 11/23/22 0816    albuterol-ipratropium (DUO-NEB) 2.5 MG-0.5 MG/3 ML, 3 mL, Nebulization, Q6H RT, Asim Flores MD, 3 mL at 11/24/22 0201    methylPREDNISolone (PF) (SOLU-MEDROL) injection 40 mg, 40 mg, IntraVENous, Q6H, Alejandro Wood MD, 40 mg at 11/24/22 0002

## 2022-11-25 LAB
ALBUMIN SERPL-MCNC: 3.6 G/DL (ref 3.5–5)
ALBUMIN/GLOB SERPL: 1.3 {RATIO} (ref 1.1–2.2)
ALP SERPL-CCNC: 68 U/L (ref 45–117)
ALT SERPL-CCNC: 67 U/L (ref 12–78)
ANION GAP SERPL CALC-SCNC: 4 MMOL/L (ref 5–15)
AST SERPL W P-5'-P-CCNC: 42 U/L (ref 15–37)
BACTERIA SPEC CULT: NORMAL
BILIRUB SERPL-MCNC: 0.3 MG/DL (ref 0.2–1)
BUN SERPL-MCNC: 19 MG/DL (ref 6–20)
BUN/CREAT SERPL: 25 (ref 12–20)
CA-I BLD-MCNC: 8.6 MG/DL (ref 8.5–10.1)
CHLORIDE SERPL-SCNC: 99 MMOL/L (ref 97–108)
CO2 SERPL-SCNC: 29 MMOL/L (ref 21–32)
COLLECT DATE STL: NORMAL
CREAT SERPL-MCNC: 0.75 MG/DL (ref 0.55–1.02)
ERYTHROCYTE [DISTWIDTH] IN BLOOD BY AUTOMATED COUNT: 14.1 % (ref 11.5–14.5)
GLOBULIN SER CALC-MCNC: 2.7 G/DL (ref 2–4)
GLUCOSE SERPL-MCNC: 170 MG/DL (ref 65–100)
HCT VFR BLD AUTO: 26.2 % (ref 35–47)
HEMOCCULT SP1 STL QL: NEGATIVE
HGB BLD-MCNC: 8.6 G/DL (ref 11.5–16)
MCH RBC QN AUTO: 30.1 PG (ref 26–34)
MCHC RBC AUTO-ENTMCNC: 32.8 G/DL (ref 30–36.5)
MCV RBC AUTO: 91.6 FL (ref 80–99)
NRBC # BLD: 0.02 K/UL (ref 0–0.01)
NRBC BLD-RTO: 0.1 PER 100 WBC
PLATELET # BLD AUTO: 300 K/UL (ref 150–400)
PMV BLD AUTO: 9.7 FL (ref 8.9–12.9)
POTASSIUM SERPL-SCNC: 4.4 MMOL/L (ref 3.5–5.1)
PROT SERPL-MCNC: 6.3 G/DL (ref 6.4–8.2)
RBC # BLD AUTO: 2.86 M/UL (ref 3.8–5.2)
SODIUM SERPL-SCNC: 132 MMOL/L (ref 136–145)
SPECIAL REQUESTS,SREQ: NORMAL
TSH SERPL DL<=0.05 MIU/L-ACNC: 0.4 UIU/ML (ref 0.36–3.74)
WBC # BLD AUTO: 18.2 K/UL (ref 3.6–11)

## 2022-11-25 PROCEDURE — 94640 AIRWAY INHALATION TREATMENT: CPT | Performed by: FAMILY MEDICINE

## 2022-11-25 PROCEDURE — 74011250637 HC RX REV CODE- 250/637: Performed by: INTERNAL MEDICINE

## 2022-11-25 PROCEDURE — 74011000258 HC RX REV CODE- 258: Performed by: INTERNAL MEDICINE

## 2022-11-25 PROCEDURE — 80053 COMPREHEN METABOLIC PANEL: CPT

## 2022-11-25 PROCEDURE — 74011250637 HC RX REV CODE- 250/637: Performed by: FAMILY MEDICINE

## 2022-11-25 PROCEDURE — 65270000029 HC RM PRIVATE

## 2022-11-25 PROCEDURE — 77010033678 HC OXYGEN DAILY

## 2022-11-25 PROCEDURE — 74011000250 HC RX REV CODE- 250: Performed by: FAMILY MEDICINE

## 2022-11-25 PROCEDURE — 74011250636 HC RX REV CODE- 250/636: Performed by: INTERNAL MEDICINE

## 2022-11-25 PROCEDURE — 94640 AIRWAY INHALATION TREATMENT: CPT

## 2022-11-25 PROCEDURE — 74011000258 HC RX REV CODE- 258: Performed by: FAMILY MEDICINE

## 2022-11-25 PROCEDURE — 36415 COLL VENOUS BLD VENIPUNCTURE: CPT

## 2022-11-25 PROCEDURE — 74011250636 HC RX REV CODE- 250/636: Performed by: FAMILY MEDICINE

## 2022-11-25 PROCEDURE — 94761 N-INVAS EAR/PLS OXIMETRY MLT: CPT

## 2022-11-25 PROCEDURE — 84443 ASSAY THYROID STIM HORMONE: CPT

## 2022-11-25 PROCEDURE — 85027 COMPLETE CBC AUTOMATED: CPT

## 2022-11-25 RX ORDER — DILTIAZEM HYDROCHLORIDE 300 MG/1
300 CAPSULE, COATED, EXTENDED RELEASE ORAL DAILY
Status: DISCONTINUED | OUTPATIENT
Start: 2022-11-26 | End: 2022-11-26

## 2022-11-25 RX ADMIN — IPRATROPIUM BROMIDE AND ALBUTEROL SULFATE 3 ML: .5; 2.5 SOLUTION RESPIRATORY (INHALATION) at 19:57

## 2022-11-25 RX ADMIN — PRAVASTATIN SODIUM 40 MG: 40 TABLET ORAL at 09:01

## 2022-11-25 RX ADMIN — LEVOTHYROXINE SODIUM 75 MCG: 0.07 TABLET ORAL at 09:00

## 2022-11-25 RX ADMIN — DILTIAZEM HYDROCHLORIDE 10 MG/HR: 5 INJECTION, SOLUTION INTRAVENOUS at 09:21

## 2022-11-25 RX ADMIN — GABAPENTIN 400 MG: 400 CAPSULE ORAL at 09:00

## 2022-11-25 RX ADMIN — METHYLPREDNISOLONE SODIUM SUCCINATE 40 MG: 40 INJECTION, POWDER, FOR SOLUTION INTRAMUSCULAR; INTRAVENOUS at 23:59

## 2022-11-25 RX ADMIN — IPRATROPIUM BROMIDE AND ALBUTEROL SULFATE 3 ML: .5; 2.5 SOLUTION RESPIRATORY (INHALATION) at 13:33

## 2022-11-25 RX ADMIN — BUDESONIDE AND FORMOTEROL FUMARATE DIHYDRATE 2 PUFF: 160; 4.5 AEROSOL RESPIRATORY (INHALATION) at 08:17

## 2022-11-25 RX ADMIN — POLYETHYLENE GLYCOL 3350 17 G: 17 POWDER, FOR SOLUTION ORAL at 09:02

## 2022-11-25 RX ADMIN — APIXABAN 5 MG: 5 TABLET, FILM COATED ORAL at 09:01

## 2022-11-25 RX ADMIN — HYDROXYCHLOROQUINE SULFATE 200 MG: 200 TABLET, FILM COATED ORAL at 09:03

## 2022-11-25 RX ADMIN — IPRATROPIUM BROMIDE AND ALBUTEROL SULFATE 3 ML: .5; 2.5 SOLUTION RESPIRATORY (INHALATION) at 08:17

## 2022-11-25 RX ADMIN — IPRATROPIUM BROMIDE AND ALBUTEROL SULFATE 3 ML: .5; 2.5 SOLUTION RESPIRATORY (INHALATION) at 02:30

## 2022-11-25 RX ADMIN — METHYLPREDNISOLONE SODIUM SUCCINATE 40 MG: 40 INJECTION, POWDER, FOR SOLUTION INTRAMUSCULAR; INTRAVENOUS at 06:47

## 2022-11-25 RX ADMIN — METOPROLOL TARTRATE 25 MG: 25 TABLET, FILM COATED ORAL at 09:00

## 2022-11-25 RX ADMIN — DULOXETINE HYDROCHLORIDE 60 MG: 30 CAPSULE, DELAYED RELEASE ORAL at 09:00

## 2022-11-25 RX ADMIN — CEFTRIAXONE SODIUM 1 G: 1 INJECTION, POWDER, FOR SOLUTION INTRAMUSCULAR; INTRAVENOUS at 12:36

## 2022-11-25 RX ADMIN — PANTOPRAZOLE SODIUM 40 MG: 40 TABLET, DELAYED RELEASE ORAL at 09:00

## 2022-11-25 RX ADMIN — DOXYCYCLINE 100 MG: 100 INJECTION, POWDER, LYOPHILIZED, FOR SOLUTION INTRAVENOUS at 23:59

## 2022-11-25 RX ADMIN — DILTIAZEM HYDROCHLORIDE 240 MG: 120 CAPSULE, COATED, EXTENDED RELEASE ORAL at 09:01

## 2022-11-25 RX ADMIN — METOPROLOL TARTRATE 25 MG: 25 TABLET, FILM COATED ORAL at 15:49

## 2022-11-25 RX ADMIN — METHYLPREDNISOLONE SODIUM SUCCINATE 40 MG: 40 INJECTION, POWDER, FOR SOLUTION INTRAMUSCULAR; INTRAVENOUS at 17:49

## 2022-11-25 RX ADMIN — GABAPENTIN 400 MG: 400 CAPSULE ORAL at 22:29

## 2022-11-25 RX ADMIN — BUDESONIDE AND FORMOTEROL FUMARATE DIHYDRATE 2 PUFF: 160; 4.5 AEROSOL RESPIRATORY (INHALATION) at 20:17

## 2022-11-25 RX ADMIN — APIXABAN 5 MG: 5 TABLET, FILM COATED ORAL at 22:30

## 2022-11-25 RX ADMIN — MONTELUKAST 10 MG: 10 TABLET, FILM COATED ORAL at 08:59

## 2022-11-25 RX ADMIN — METHYLPREDNISOLONE SODIUM SUCCINATE 40 MG: 40 INJECTION, POWDER, FOR SOLUTION INTRAMUSCULAR; INTRAVENOUS at 12:35

## 2022-11-25 RX ADMIN — DOXYCYCLINE 100 MG: 100 INJECTION, POWDER, LYOPHILIZED, FOR SOLUTION INTRAVENOUS at 12:35

## 2022-11-25 NOTE — PROGRESS NOTES
Pulmonary and critical care progress note       Reason for consultation:  Acute respiratory failure with hypoxia and atrial fibrillation with rapid ventricular response        Patient is a 70y.o. year old female with signal has a past medical history of chronic A. fib not on anticoagulation secondary to bleed COPD hypothyroidism hyperlipidemia chronic pain neuropathy     Seen and examined  Overnight events noted  Lying in bed comfortably  On 3 L nasal cannula oxygen  No acute distress  As of shortness of breath when she comes out of bed to use her bedside commode. Transferred out of ICU  Remains in A. fib with elevated ventricular response  Likely to be placed back on IV Cardizem drip      Prior to Admission medications    Medication Sig Start Date End Date Taking? Authorizing Provider   gabapentin (NEURONTIN) 600 mg tablet Take 600 mg by mouth three (3) times daily. Yes Provider, Historical   hydrOXYzine HCL (ATARAX) 25 mg tablet Take 25 mg by mouth three (3) times daily as needed. Provider, Historical   pantoprazole (PROTONIX) 40 mg tablet Take 40 mg by mouth daily. Provider, Historical   pravastatin (PRAVACHOL) 40 mg tablet Take 40 mg by mouth daily. 7/2/18   Provider, Historical   nortriptyline (PAMELOR) 10 mg capsule Take 10 mg by mouth nightly. Provider, Historical   montelukast (SINGULAIR) 10 mg tablet Take 10 mg by mouth daily. Provider, Historical   acetaminophen (TYLENOL EXTRA STRENGTH) 500 mg tablet Take 500 mg by mouth every six (6) hours as needed. Provider, Historical   levothyroxine (SYNTHROID) 50 mcg tablet Take 50 mcg by mouth Daily (before breakfast). Provider, Historical   dilTIAZem ER (CARDIZEM CD) 120 mg capsule Take 120 mg by mouth daily. Provider, Historical   citalopram (CELEXA) 20 mg tablet Take 20 mg by mouth daily. Provider, Historical   fluticasone-salmeterol (ADVAIR DISKUS) 250-50 mcg/Dose diskus inhaler Take 1 Puff by inhalation every twelve (12) hours. Provider, Historical   IPRATROPIUM/ALBUTEROL SULFATE (COMBIVENT IN) Take 14.7 g by inhalation four (4) times daily. 10/22/10   Provider, Historical   calcium-vitamin D (OYSTER SHELL) 500 mg(1,250mg) -200 unit per tablet Take 1 Tab by mouth two (2) times daily (with meals).     Provider, Historical         Current Facility-Administered Medications:     polyethylene glycol (MIRALAX) packet 17 g, 17 g, Oral, DAILY, Asim Flores MD, 17 g at 11/25/22 0902    cefTRIAXone (ROCEPHIN) 1 g in sterile water (preservative free) 10 mL IV syringe, 1 g, IntraVENous, Q24H, Asim Flores MD, 1 g at 11/24/22 1247    doxycycline (VIBRAMYCIN) 100 mg in 0.9% sodium chloride (MBP/ADV) 100 mL MBP, 100 mg, IntraVENous, Q12H, Tyrone Olivo MD, Last Rate: 100 mL/hr at 11/24/22 2355, 100 mg at 11/24/22 2355    metoprolol tartrate (LOPRESSOR) tablet 25 mg, 25 mg, Oral, BID, Nati Correa MD, 25 mg at 11/25/22 0900    apixaban (ELIQUIS) tablet 5 mg, 5 mg, Oral, BID, Nati Correa MD, 5 mg at 11/25/22 0901    dilTIAZem ER (CARDIZEM CD) capsule 240 mg, 240 mg, Oral, DAILY, Nati Correa MD, 240 mg at 11/25/22 0901    dilTIAZem (CARDIZEM) 125 mg in 0.9% sodium chloride 125 mL ([id number]), 0-15 mg/hr, IntraVENous, TITRATE, Asim Flores MD, Last Rate: 10 mL/hr at 11/25/22 0921, 10 mg/hr at 11/25/22 0921    budesonide-formoteroL (SYMBICORT) 160-4.5 mcg/actuation HFA inhaler 2 Puff, 2 Puff, Inhalation, BID, Asim Flores MD, 2 Puff at 11/25/22 7916    hydrOXYzine HCL (ATARAX) tablet 25 mg, 25 mg, Oral, TID PRN, Asim Flores MD    montelukast (SINGULAIR) tablet 10 mg, 10 mg, Oral, DAILY, Asim Flores MD, 10 mg at 11/25/22 0859    [Held by provider] nortriptyline (PAMELOR) capsule 10 mg, 10 mg, Oral, QHS, Asim Flores MD    pantoprazole (PROTONIX) tablet 40 mg, 40 mg, Oral, DAILY, Asim Flores MD, 40 mg at 11/25/22 0900    pravastatin (PRAVACHOL) tablet 40 mg, 40 mg, Oral, DAILY, Monica Flores, MD, 40 mg at 11/25/22 0901    glucose chewable tablet 16 g, 4 Tablet, Oral, PRN, Meme Flores MD    glucagon (GLUCAGEN) injection 1 mg, 1 mg, IntraMUSCular, PRN, Meme Flores MD    acetaminophen (TYLENOL) tablet 650 mg, 650 mg, Oral, Q6H PRN, 650 mg at 11/24/22 2349 **OR** acetaminophen (TYLENOL) suppository 650 mg, 650 mg, Rectal, Q6H PRN, Asim Flores MD    polyethylene glycol (MIRALAX) packet 17 g, 17 g, Oral, DAILY PRN, Rai Spencer MD, 17 g at 11/24/22 1636    ondansetron (ZOFRAN ODT) tablet 4 mg, 4 mg, Oral, Q8H PRN **OR** ondansetron (ZOFRAN) injection 4 mg, 4 mg, IntraVENous, Q6H PRN, Asim Flores MD    levothyroxine (SYNTHROID) tablet 75 mcg, 75 mcg, Oral, ACB, Asim Flores MD, 75 mcg at 11/25/22 0900    DULoxetine (CYMBALTA) capsule 60 mg, 60 mg, Oral, DAILY, Rai Spencer MD, 60 mg at 11/25/22 0900    gabapentin (NEURONTIN) capsule 400 mg, 400 mg, Oral, BID, Asim Flores MD, 400 mg at 11/25/22 0900    hydrOXYchloroQUINE (PLAQUENIL) tablet 200 mg, 200 mg, Oral, DAILY WITH BREAKFAST, Rai Spencer MD, 200 mg at 11/25/22 3579    albuterol-ipratropium (DUO-NEB) 2.5 MG-0.5 MG/3 ML, 3 mL, Nebulization, Q6H RT, Asim Flores MD, 3 mL at 11/25/22 0817    methylPREDNISolone (PF) (SOLU-MEDROL) injection 40 mg, 40 mg, IntraVENous, Q6H, Asim Flores MD, 40 mg at 11/25/22 0647    LAB DATA REVIEWED:    Recent Results (from the past 24 hour(s))   CBC W/O DIFF    Collection Time: 11/25/22  3:18 AM   Result Value Ref Range    WBC 18.2 (H) 3.6 - 11.0 K/uL    RBC 2.86 (L) 3.80 - 5.20 M/uL    HGB 8.6 (L) 11.5 - 16.0 g/dL    HCT 26.2 (L) 35.0 - 47.0 %    MCV 91.6 80.0 - 99.0 FL    MCH 30.1 26.0 - 34.0 PG    MCHC 32.8 30.0 - 36.5 g/dL    RDW 14.1 11.5 - 14.5 %    PLATELET 421 363 - 848 K/uL    MPV 9.7 8.9 - 12.9 FL    NRBC 0.1 (H) 0.0  WBC    ABSOLUTE NRBC 0.02 (H) 0.00 - 9.39 K/uL   METABOLIC PANEL, COMPREHENSIVE    Collection Time: 11/25/22  3:18 AM   Result Value Ref Range    Sodium 132 (L) 136 - 145 mmol/L    Potassium 4.4 3.5 - 5.1 mmol/L    Chloride 99 97 - 108 mmol/L    CO2 29 21 - 32 mmol/L    Anion gap 4 (L) 5 - 15 mmol/L    Glucose 170 (H) 65 - 100 mg/dL    BUN 19 6 - 20 mg/dL    Creatinine 0.75 0.55 - 1.02 mg/dL    BUN/Creatinine ratio 25 (H) 12 - 20      eGFR >60 >60 ml/min/1.73m2    Calcium 8.6 8.5 - 10.1 mg/dL    Bilirubin, total 0.3 0.2 - 1.0 mg/dL    AST (SGOT) 42 (H) 15 - 37 U/L    ALT (SGPT) 67 12 - 78 U/L    Alk. phosphatase 68 45 - 117 U/L    Protein, total 6.3 (L) 6.4 - 8.2 g/dL    Albumin 3.6 3.5 - 5.0 g/dL    Globulin 2.7 2.0 - 4.0 g/dL    A-G Ratio 1.3 1.1 - 2.2     TSH 3RD GENERATION    Collection Time: 11/25/22  3:55 AM   Result Value Ref Range    TSH 0.40 0.36 - 3.74 uIU/mL       XR Results (most recent):  Results from Hospital Encounter encounter on 11/22/22    XR CHEST PORT    Narrative  PORTABLE CHEST RADIOGRAPH/S: 11/24/2022 3:53 AM    INDICATION: Respiratory failure. COMPARISON: 11/22/2022, CT chest 11/23/2022. TECHNIQUE: Portable frontal semiupright radiograph/s of the chest.    FINDINGS:  The lungs are emphysematous, but clear. The central airways are patent. No  pneumothorax or pleural effusion. Impression  Emphysematous but clear lungs. XR CHEST PORT   Final Result   Emphysematous but clear lungs. CTA CHEST W OR W WO CONT   Final Result   1. No evidence of pulmonary embolus. 2.  Small right pleural effusion with mild right basilar airspace disease. 3. Centrilobular emphysema. 4. Cardiomegaly.             XR CHEST PORT   Final Result      No acute process on portable chest.              Review of Systems:  Review of 12 systems was performed and is noncontributory other than as outlined above    Objective:   VITALS:    Visit Vitals  /76 (BP 1 Location: Right upper arm, BP Patient Position: Sitting)   Pulse (!) 125   Temp 98.7 °F (37.1 °C)   Resp 23   Ht 5' 4\" (1.626 m)   Wt 57.6 kg (127 lb)   SpO2 98% Breastfeeding No   BMI 21.80 kg/m²       Physical Exam:   Constitutional: pt is oriented to person, place, and time. HENT:   Head: Normocephalic and atraumatic. Eyes: Pupils are equal, round, and reactive to light. EOM are normal.   Cardiovascular: Irregularly irregular, tachycardic  Pulmonary/Chest: Reduced air entry bilaterally with prolonged exhalation, occasional wheezing, basal crackles  Exhibits no tenderness. Abdominal: Soft. Bowel sounds are normal. There is no abdominal tenderness. There is no rebound and no guarding. Musculoskeletal: Normal range of motion. Neurological: pt is alert and oriented to person, place, and time. Alert. Normal strength. No cranial nerve deficit or sensory deficit. Displays a negative Romberg sign. ASSESSMENT:    1. Acute on chronic respiratory failure with hypoxia  2. Acute exacerbation of COPD  3. Paroxysmal atrial fibrillation with rapid ventricular response  4. Left-sided pleuritic chest pain  5. Hypertension  6. Depression    Plan:    Continue nasal cannula oxygen as needed to keep saturation above 92%    Patient in rapid A. fib  Has been complaining of left-sided pleuritic chest pain  Tachycardic therefore might Place back on Cardizem drip  CT angiogram of chest did not show any pulmonary embolism. Showed cardiomegaly and changes of emphysema. Acute exacerbation of COPD  Continue Solu-Medrol  Continue Rocephin  Added doxycycline for atypical coverage  Nebulizers as needed  Symbicort inhaler    Cardizem and atenolol.     placed on Lasix 40 mg IV  Check electrolytes and replace as needed  Intake and output charting  Monitor renal function with fluid changes    Continue blood pressure medications  Cymbalta 60 mg daily  Neurontin 400 mg twice a day    DVT and GI prophylaxis    Questions of patient were answered at bedside in detail  Case discussed in detail with RN, RT, and care team  Thank you for involving me in the care of this patient  I will follow with you closely during hospitalization    Time spent more than 30 minutes under patient care with no overlap reviewing results and records, decision making, and answering questions.     Yolanda Cervantes MD  Pulmonary/CC

## 2022-11-25 NOTE — PROGRESS NOTES
Dr> Xiang Arguelles phoned of [phone].   Orders for Cardizem Drip at 10mg/hr and continue PO dose as well

## 2022-11-25 NOTE — PROGRESS NOTES
Problem: Pressure Injury - Risk of  Goal: *Prevention of pressure injury  Description: Document Ismael Scale and appropriate interventions in the flowsheet. Outcome: Progressing Towards Goal  Note: Pressure Injury Interventions:  Sensory Interventions: Assess changes in LOC, Assess need for specialty bed, Discuss PT/OT consult with provider, Float heels, Keep linens dry and wrinkle-free, Minimize linen layers, Monitor skin under medical devices, Pad between skin to skin, Pressure redistribution bed/mattress (bed type), Turn and reposition approx. every two hours (pillows and wedges if needed)         Activity Interventions: Pressure redistribution bed/mattress(bed type)    Mobility Interventions: Float heels, HOB 30 degrees or less, Assess need for specialty bed, Pressure redistribution bed/mattress (bed type), PT/OT evaluation, Turn and reposition approx. every two hours(pillow and wedges)    Nutrition Interventions: Document food/fluid/supplement intake                     Problem: Patient Education: Go to Patient Education Activity  Goal: Patient/Family Education  Outcome: Progressing Towards Goal     Problem: Falls - Risk of  Goal: *Absence of Falls  Description: Document Darlin Ground Fall Risk and appropriate interventions in the flowsheet.   Outcome: Progressing Towards Goal  Note: Fall Risk Interventions:  Mobility Interventions: Bed/chair exit alarm         Medication Interventions: Patient to call before getting OOB                   Problem: Patient Education: Go to Patient Education Activity  Goal: Patient/Family Education  Outcome: Progressing Towards Goal     Problem: Discharge Planning  Goal: *Discharge to safe environment  Outcome: Progressing Towards Goal  Goal: *Knowledge of medication management  Outcome: Progressing Towards Goal  Goal: *Knowledge of discharge instructions  Outcome: Progressing Towards Goal     Problem: Patient Education: Go to Patient Education Activity  Goal: Patient/Family Education  Outcome: Progressing Towards Goal     Problem: Arrhythmia Pathway (Adult)  Goal: *Absence of arrhythmia  Outcome: Progressing Towards Goal     Problem: Patient Education: Go to Patient Education Activity  Goal: Patient/Family Education  Outcome: Progressing Towards Goal

## 2022-11-25 NOTE — PROGRESS NOTES
TRANSFER from ICU  Primary Nurse Flynn Shone, RN and Amador Duran RN performed a dual skin assessment on this patient Impairment noted- see wound doc flow sheet. Abrasions to R outer foot, R lateral elbow, and R hand 5th digit.   Ismael score is 21

## 2022-11-25 NOTE — PROGRESS NOTES
Progress Note      11/25/2022 10:20 AM  NAME: Arlette Anaya   MRN:  [id number]   Admit Diagnosis: Atrial fibrillation with RVR ([address] [I48.91]  Atrial fibrillation ([address] [I48.91]          Assessment/Plan:   Atrial fibrillation with rapid ventricular response. Patient's rate control of atrial fibrillation is better at rest but increases with activities due to underlying advanced COPD. I will increase patient's Cardizem CD from 240 mg daily to 360 mg daily. Continue metoprolol 25 mg twice daily along with anticoagulation by Eliquis. History of GERD with GI bleed 5 years ago. Continue Protonix. COPD, on home oxygen. 4.   Pulmonary hypertension related to advanced COPD. PA systolic pressure of 47 mmHg. Normal LV function by echo.         []       High complexity decision making was performed in this patient at high risk for decompensation with multiple organ involvement. Subjective:     Arlette Anaya denies chest pain, get short of breath on moving around discussed with RN events overnight. Review of Systems:    Symptom Y/N Comments  Symptom Y/N Comments   Fever/Chills N   Chest Pain N    Poor Appetite N   Edema N    Cough N   Abdominal Pain N    Sputum N   Joint Pain N    SOB/PEDRAZA Y   Pruritis/Rash N    Nausea/vomit N   Tolerating PT/OT Y    Diarrhea N   Tolerating Diet Y    Constipation N   Other       Could NOT obtain due to:      Objective:      Physical Exam:    Last 24hrs VS reviewed since prior progress note.  Most recent are:    Visit Vitals  /62 (BP 1 Location: Right upper arm, BP Patient Position: Supine)   Pulse 86   Temp 98.2 °F (36.8 °C)   Resp 23   Ht 5' 4\" (1.626 m)   Wt 57.6 kg (127 lb)   SpO2 99%   Breastfeeding No   BMI 21.80 kg/m²       Intake/Output Summary (Last 24 hours) at 11/25/2022 1317  Last data filed at 11/24/2022 1745  Gross per 24 hour   Intake 10 ml   Output 50 ml   Net -40 ml          General Appearance: Well developed, well nourished, alert; no acute distress. Ears/Nose/Mouth/Throat: Hearing grossly normal; moist mucous membranes  Neck: Supple. Chest: Lungs clear to auscultation bilaterally. Cardiovascular: Irregular rate and rhythm, S1S2 normal, no murmur. Abdomen: Soft, non-tender, bowel sounds are active. Extremities: No edema bilaterally. Skin: Warm and dry. []         Post-cath site without hematoma, bruit, tenderness, or thrill. Distal pulses intact. PMH/ reviewed - no change compared to H&P    Data Review    Telemetry:     EKG:   []  No new EKG for review    Lab Data Personally Reviewed:    Recent Labs     11/25/22 0318 11/24/22 0231   WBC 18.2* 15.7*   HGB 8.6* 8.0*   HCT 26.2* 24.6*    283       No results for input(s): INR, PTP, APTT, INREXT, INREXT in the last 72 hours. Recent Labs     11/25/22 0318 11/24/22 0231 11/23/22 0349   * 131* 135*   K 4.4 4.0 4.1   CL 99 98 101   CO2 29 27 29   BUN 19 19 12   CREA 0.75 1.02 0.79   * 191* 205*   CA 8.6 8.2* 8.4*       No results for input(s): CPK, CKNDX, TROIQ in the last 72 hours. No lab exists for component: CPKMB  No results found for: CHOL, CHOLX, CHLST, CHOLV, HDL, HDLP, LDL, LDLC, DLDLP, Apolonia Khat, CHHD, CHHDX    Recent Labs     11/25/22 0318 11/24/22 0231 11/23/22  0349   AP 68 69 80   TP 6.3* 6.4 6.9   ALB 3.6 3.4* 3.4*   GLOB 2.7 3.0 3.5       No results for input(s): PH, PCO2, PO2 in the last 72 hours.     Medications Personally Reviewed:    Current Facility-Administered Medications   Medication Dose Route Frequency    polyethylene glycol (MIRALAX) packet 17 g  17 g Oral DAILY    cefTRIAXone (ROCEPHIN) 1 g in sterile water (preservative free) 10 mL IV syringe  1 g IntraVENous Q24H    doxycycline (VIBRAMYCIN) 100 mg in 0.9% sodium chloride (MBP/ADV) 100 mL MBP  100 mg IntraVENous Q12H    metoprolol tartrate (LOPRESSOR) tablet 25 mg  25 mg Oral BID    apixaban (ELIQUIS) tablet 5 mg  5 mg Oral BID    dilTIAZem ER (CARDIZEM CD) capsule 240 mg  240 mg Oral DAILY    dilTIAZem (CARDIZEM) 125 mg in 0.9% sodium chloride 125 mL ([id number])  0-15 mg/hr IntraVENous TITRATE    budesonide-formoteroL (SYMBICORT) 160-4.5 mcg/actuation HFA inhaler 2 Puff  2 Puff Inhalation BID    hydrOXYzine HCL (ATARAX) tablet 25 mg  25 mg Oral TID PRN    montelukast (SINGULAIR) tablet 10 mg  10 mg Oral DAILY    [Held by provider] nortriptyline (PAMELOR) capsule 10 mg  10 mg Oral QHS    pantoprazole (PROTONIX) tablet 40 mg  40 mg Oral DAILY    pravastatin (PRAVACHOL) tablet 40 mg  40 mg Oral DAILY    glucose chewable tablet 16 g  4 Tablet Oral PRN    glucagon (GLUCAGEN) injection 1 mg  1 mg IntraMUSCular PRN    acetaminophen (TYLENOL) tablet 650 mg  650 mg Oral Q6H PRN    Or    acetaminophen (TYLENOL) suppository 650 mg  650 mg Rectal Q6H PRN    polyethylene glycol (MIRALAX) packet 17 g  17 g Oral DAILY PRN    ondansetron (ZOFRAN ODT) tablet 4 mg  4 mg Oral Q8H PRN    Or    ondansetron (ZOFRAN) injection 4 mg  4 mg IntraVENous Q6H PRN    levothyroxine (SYNTHROID) tablet 75 mcg  75 mcg Oral ACB    DULoxetine (CYMBALTA) capsule 60 mg  60 mg Oral DAILY    gabapentin (NEURONTIN) capsule 400 mg  400 mg Oral BID    hydrOXYchloroQUINE (PLAQUENIL) tablet 200 mg  200 mg Oral DAILY WITH BREAKFAST    albuterol-ipratropium (DUO-NEB) 2.5 MG-0.5 MG/3 ML  3 mL Nebulization Q6H RT    methylPREDNISolone (PF) (SOLU-MEDROL) injection 40 mg  40 mg IntraVENous Chelsea Morgan MD

## 2022-11-25 NOTE — PROGRESS NOTES
Progress Note    Patient: Alexey Jordan MRN: [id number]  SSN: [id number]    YOB: 1950  Age: 70 y.o. Sex: female      Admit Date: 11/22/2022    LOS: 3 days     Subjective:     1 years ago with acute hypoxic respiratory failure, acute on chronic A. fib with rapid ventricular rate    Objective:     Vitals:    11/25/22 0827 11/25/22 1106 11/25/22 1200 11/25/22 1544   BP: 129/76 131/62  123/66   Pulse: (!) 125 86 82 87   Resp: 23 23 24   Temp: 98.7 °F (37.1 °C) 98.2 °F (36.8 °C)  98.1 °F (36.7 °C)   SpO2: 98% 99%  96%   Weight:       Height:            Intake and Output:  Current Shift: No intake/output data recorded. Last three shifts: 11/23 1901 - 11/25 0700  In: 210 [I.V.:210]  Out: 1900 [Urine:1900]    Physical Exam:   General:  Alert, cooperative, no distress, appears stated age. Eyes:  Conjunctivae/corneas clear. PERRL, EOMs intact. Fundi benign   Ears:  Normal TMs and external ear canals both ears. Nose: Nares normal. Septum midline. Mucosa normal. No drainage or sinus tenderness. Mouth/Throat: Lips, mucosa, and tongue normal. Teeth and gums normal.   Neck: Supple, symmetrical, trachea midline, no adenopathy, thyroid: no enlargment/tenderness/nodules, no carotid bruit and no JVD. Back:   Symmetric, no curvature. ROM normal. No CVA tenderness. Lungs:   Clear to auscultation bilaterally. Heart:  Regular rate and rhythm, S1, S2 normal, no murmur, click, rub or gallop. Abdomen:   Soft, non-tender. Bowel sounds normal. No masses,  No organomegaly. Extremities: Extremities normal, atraumatic, no cyanosis or edema. Pulses: 2+ and symmetric all extremities. Skin: Skin color, texture, turgor normal. No rashes or lesions   Lymph nodes: Cervical, supraclavicular, and axillary nodes normal.   Neurologic: CNII-XII intact. Normal strength, sensation and reflexes throughout. Lab/Data Review: All lab results for the last 24 hours reviewed.          Assessment:     Active Problems:    Atrial fibrillation with RVR ([facility] (11/22/2022)      Atrial fibrillation ([facility] (11/22/2022)        Plan: With Care.   Cardiology to evaluate for discharge    Signed By: Juan Dixon MD     November 25, 2022

## 2022-11-26 LAB
ANION GAP SERPL CALC-SCNC: 4 MMOL/L (ref 5–15)
BUN SERPL-MCNC: 19 MG/DL (ref 6–20)
BUN/CREAT SERPL: 28 (ref 12–20)
CA-I BLD-MCNC: 8.7 MG/DL (ref 8.5–10.1)
CHLORIDE SERPL-SCNC: 101 MMOL/L (ref 97–108)
CO2 SERPL-SCNC: 31 MMOL/L (ref 21–32)
CREAT SERPL-MCNC: 0.68 MG/DL (ref 0.55–1.02)
GLUCOSE SERPL-MCNC: 152 MG/DL (ref 65–100)
MAGNESIUM SERPL-MCNC: 2.7 MG/DL (ref 1.6–2.4)
POTASSIUM SERPL-SCNC: 4.8 MMOL/L (ref 3.5–5.1)
SODIUM SERPL-SCNC: 136 MMOL/L (ref 136–145)

## 2022-11-26 PROCEDURE — 74011250636 HC RX REV CODE- 250/636: Performed by: FAMILY MEDICINE

## 2022-11-26 PROCEDURE — 74011000250 HC RX REV CODE- 250: Performed by: INTERNAL MEDICINE

## 2022-11-26 PROCEDURE — 74011000250 HC RX REV CODE- 250: Performed by: FAMILY MEDICINE

## 2022-11-26 PROCEDURE — 74011250637 HC RX REV CODE- 250/637: Performed by: INTERNAL MEDICINE

## 2022-11-26 PROCEDURE — 80048 BASIC METABOLIC PNL TOTAL CA: CPT

## 2022-11-26 PROCEDURE — 83735 ASSAY OF MAGNESIUM: CPT

## 2022-11-26 PROCEDURE — 74011000258 HC RX REV CODE- 258: Performed by: INTERNAL MEDICINE

## 2022-11-26 PROCEDURE — 94761 N-INVAS EAR/PLS OXIMETRY MLT: CPT

## 2022-11-26 PROCEDURE — 74011250636 HC RX REV CODE- 250/636: Performed by: INTERNAL MEDICINE

## 2022-11-26 PROCEDURE — 36415 COLL VENOUS BLD VENIPUNCTURE: CPT

## 2022-11-26 PROCEDURE — 65270000029 HC RM PRIVATE

## 2022-11-26 PROCEDURE — 77010033678 HC OXYGEN DAILY

## 2022-11-26 PROCEDURE — 94640 AIRWAY INHALATION TREATMENT: CPT

## 2022-11-26 PROCEDURE — 74011250637 HC RX REV CODE- 250/637: Performed by: FAMILY MEDICINE

## 2022-11-26 RX ORDER — IPRATROPIUM BROMIDE AND ALBUTEROL SULFATE 2.5; .5 MG/3ML; MG/3ML
3 SOLUTION RESPIRATORY (INHALATION)
Status: DISCONTINUED | OUTPATIENT
Start: 2022-11-26 | End: 2022-11-26

## 2022-11-26 RX ORDER — IPRATROPIUM BROMIDE AND ALBUTEROL SULFATE 2.5; .5 MG/3ML; MG/3ML
3 SOLUTION RESPIRATORY (INHALATION)
Status: DISCONTINUED | OUTPATIENT
Start: 2022-11-26 | End: 2022-11-27

## 2022-11-26 RX ORDER — DILTIAZEM HYDROCHLORIDE 120 MG/1
240 CAPSULE, COATED, EXTENDED RELEASE ORAL 2 TIMES DAILY
Status: DISCONTINUED | OUTPATIENT
Start: 2022-11-26 | End: 2022-11-30 | Stop reason: HOSPADM

## 2022-11-26 RX ADMIN — GABAPENTIN 400 MG: 400 CAPSULE ORAL at 08:13

## 2022-11-26 RX ADMIN — DOXYCYCLINE 100 MG: 100 INJECTION, POWDER, LYOPHILIZED, FOR SOLUTION INTRAVENOUS at 23:26

## 2022-11-26 RX ADMIN — PRAVASTATIN SODIUM 40 MG: 40 TABLET ORAL at 08:13

## 2022-11-26 RX ADMIN — IPRATROPIUM BROMIDE AND ALBUTEROL SULFATE 3 ML: .5; 2.5 SOLUTION RESPIRATORY (INHALATION) at 13:17

## 2022-11-26 RX ADMIN — METHYLPREDNISOLONE SODIUM SUCCINATE 40 MG: 40 INJECTION, POWDER, FOR SOLUTION INTRAMUSCULAR; INTRAVENOUS at 06:17

## 2022-11-26 RX ADMIN — METHYLPREDNISOLONE SODIUM SUCCINATE 40 MG: 40 INJECTION, POWDER, FOR SOLUTION INTRAMUSCULAR; INTRAVENOUS at 17:04

## 2022-11-26 RX ADMIN — HYDROXYCHLOROQUINE SULFATE 200 MG: 200 TABLET, FILM COATED ORAL at 08:13

## 2022-11-26 RX ADMIN — MONTELUKAST 10 MG: 10 TABLET, FILM COATED ORAL at 08:12

## 2022-11-26 RX ADMIN — METOPROLOL TARTRATE 25 MG: 25 TABLET, FILM COATED ORAL at 08:13

## 2022-11-26 RX ADMIN — METOPROLOL TARTRATE 25 MG: 25 TABLET, FILM COATED ORAL at 15:51

## 2022-11-26 RX ADMIN — APIXABAN 5 MG: 5 TABLET, FILM COATED ORAL at 20:24

## 2022-11-26 RX ADMIN — DILTIAZEM HYDROCHLORIDE 240 MG: 120 CAPSULE, COATED, EXTENDED RELEASE ORAL at 20:24

## 2022-11-26 RX ADMIN — CEFTRIAXONE SODIUM 1 G: 1 INJECTION, POWDER, FOR SOLUTION INTRAMUSCULAR; INTRAVENOUS at 12:37

## 2022-11-26 RX ADMIN — IPRATROPIUM BROMIDE AND ALBUTEROL SULFATE 3 ML: .5; 2.5 SOLUTION RESPIRATORY (INHALATION) at 01:50

## 2022-11-26 RX ADMIN — DILTIAZEM HYDROCHLORIDE 300 MG: 300 CAPSULE, COATED, EXTENDED RELEASE ORAL at 08:12

## 2022-11-26 RX ADMIN — IPRATROPIUM BROMIDE AND ALBUTEROL SULFATE 3 ML: .5; 2.5 SOLUTION RESPIRATORY (INHALATION) at 08:02

## 2022-11-26 RX ADMIN — DULOXETINE HYDROCHLORIDE 60 MG: 30 CAPSULE, DELAYED RELEASE ORAL at 08:12

## 2022-11-26 RX ADMIN — BUDESONIDE AND FORMOTEROL FUMARATE DIHYDRATE 2 PUFF: 160; 4.5 AEROSOL RESPIRATORY (INHALATION) at 08:02

## 2022-11-26 RX ADMIN — GABAPENTIN 400 MG: 400 CAPSULE ORAL at 20:24

## 2022-11-26 RX ADMIN — DOXYCYCLINE 100 MG: 100 INJECTION, POWDER, LYOPHILIZED, FOR SOLUTION INTRAVENOUS at 12:37

## 2022-11-26 RX ADMIN — METHYLPREDNISOLONE SODIUM SUCCINATE 40 MG: 40 INJECTION, POWDER, FOR SOLUTION INTRAMUSCULAR; INTRAVENOUS at 12:37

## 2022-11-26 RX ADMIN — IPRATROPIUM BROMIDE AND ALBUTEROL SULFATE 3 ML: .5; 2.5 SOLUTION RESPIRATORY (INHALATION) at 20:28

## 2022-11-26 RX ADMIN — PANTOPRAZOLE SODIUM 40 MG: 40 TABLET, DELAYED RELEASE ORAL at 08:13

## 2022-11-26 RX ADMIN — APIXABAN 5 MG: 5 TABLET, FILM COATED ORAL at 08:13

## 2022-11-26 RX ADMIN — BUDESONIDE AND FORMOTEROL FUMARATE DIHYDRATE 2 PUFF: 160; 4.5 AEROSOL RESPIRATORY (INHALATION) at 20:28

## 2022-11-26 RX ADMIN — LEVOTHYROXINE SODIUM 75 MCG: 0.07 TABLET ORAL at 08:13

## 2022-11-26 RX ADMIN — METHYLPREDNISOLONE SODIUM SUCCINATE 40 MG: 40 INJECTION, POWDER, FOR SOLUTION INTRAMUSCULAR; INTRAVENOUS at 23:26

## 2022-11-26 NOTE — PROGRESS NOTES
Progress Note      11/26/2022 10:20 AM  NAME: Salima Mae   MRN:  [id number]   Admit Diagnosis: Atrial fibrillation with RVR ([facility]) [I48.91]  Atrial fibrillation ([address] [I48.91]          Assessment/Plan:   Atrial fibrillation with rapid ventricular response. Patient's rate control of atrial fibrillation is better at rest but increases with activities due to underlying advanced COPD. Patient heart rate with out of bed activities goes [id number] making her lightheaded. I will further increase patient's Cardizem CD from 300 mg daily to 240 twice daily. Continue metoprolol 25 mg twice daily along with anticoagulation by Eliquis. Patient is reluctant to use amiodarone due to her advanced COPD. She may require AV node ablation with permanent pacemaker if rate control of atrial fibrillation remains an issue. NSVT. Patient overnight has been noted for episodic NSVT with longest run being 7 beats at a rate of approximately 150 bpm.  Serum potassium and magnesium have been noted to be in normal range. Patient is NSVT appears to be due to her hypoxic respiratory failure. Patient's recent echocardiogram has shown preserved LV systolic function. I will continue current regimen of beta-blocker with observation. History of GERD with GI bleed 5 years ago. Continue Protonix. COPD, on home oxygen. Pulmonary hypertension related to advanced COPD. PA systolic pressure of 47 mmHg. Normal LV function by echo.         []       High complexity decision making was performed in this patient at high risk for decompensation with multiple organ involvement. Subjective:     Salima Mae denies chest pain, get short of breath on moving around discussed with RN events overnight.      Review of Systems:    Symptom Y/N Comments  Symptom Y/N Comments   Fever/Chills N   Chest Pain N    Poor Appetite N   Edema N    Cough N   Abdominal Pain N    Sputum N   Joint Pain N    SOB/PEDRAZA Y   Pruritis/Rash N    Nausea/vomit N Tolerating PT/OT Y    Diarrhea N   Tolerating Diet Y    Constipation N   Other       Could NOT obtain due to:      Objective:      Physical Exam:    Last 24hrs VS reviewed since prior progress note. Most recent are:    Visit Vitals  /72 (BP 1 Location: Left upper arm)   Pulse 92   Temp 97.6 °F (36.4 °C)   Resp 28   Ht 5' 4\" (1.626 m)   Wt 57.6 kg (127 lb)   SpO2 100%   Breastfeeding No   BMI 21.80 kg/m²     No intake or output data in the 24 hours ending 11/26/22 1156       General Appearance: Well developed, well nourished, alert; no acute distress. Ears/Nose/Mouth/Throat: Hearing grossly normal; moist mucous membranes  Neck: Supple. Chest: Lungs clear to auscultation bilaterally. Cardiovascular: Irregular rate and rhythm, S1S2 normal, no murmur. Abdomen: Soft, non-tender, bowel sounds are active. Extremities: No edema bilaterally. Skin: Warm and dry. []         Post-cath site without hematoma, bruit, tenderness, or thrill. Distal pulses intact. PMH/SH reviewed - no change compared to H&P    Data Review    Telemetry:     EKG:   []  No new EKG for review    Lab Data Personally Reviewed:    Recent Labs     11/25/22 0318 11/24/22  0231   WBC 18.2* 15.7*   HGB 8.6* 8.0*   HCT 26.2* 24.6*    283       No results for input(s): INR, PTP, APTT, INREXT, INREXT in the last 72 hours. Recent Labs     11/26/22  0717 11/25/22 0318 11/24/22  0231    132* 131*   K 4.8 4.4 4.0    99 98   CO2 31 29 27   BUN 19 19 19   CREA 0.68 0.75 1.02   * 170* 191*   CA 8.7 8.6 8.2*   MG 2.7*  --   --        No results for input(s): CPK, CKNDX, TROIQ in the last 72 hours.     No lab exists for component: CPKMB  No results found for: CHOL, CHOLX, CHLST, CHOLV, HDL, HDLP, LDL, LDLC, DLDLP, TGLX, TRIGL, TRIGP, CHHD, CHHDX    Recent Labs     11/25/22 0318 11/24/22  0231   AP 68 69   TP 6.3* 6.4   ALB 3.6 3.4*   GLOB 2.7 3.0       No results for input(s): PH, PCO2, PO2 in the last 72 hours.    Medications Personally Reviewed:    Current Facility-Administered Medications   Medication Dose Route Frequency    albuterol-ipratropium (DUO-NEB) 2.5 MG-0.5 MG/3 ML  3 mL Nebulization Q6H RT    dilTIAZem ER (CARDIZEM CD) capsule 300 mg  300 mg Oral DAILY    polyethylene glycol (MIRALAX) packet 17 g  17 g Oral DAILY    cefTRIAXone (ROCEPHIN) 1 g in sterile water (preservative free) 10 mL IV syringe  1 g IntraVENous Q24H    doxycycline (VIBRAMYCIN) 100 mg in 0.9% sodium chloride (MBP/ADV) 100 mL MBP  100 mg IntraVENous Q12H    metoprolol tartrate (LOPRESSOR) tablet 25 mg  25 mg Oral BID    apixaban (ELIQUIS) tablet 5 mg  5 mg Oral BID    budesonide-formoteroL (SYMBICORT) 160-4.5 mcg/actuation HFA inhaler 2 Puff  2 Puff Inhalation BID    hydrOXYzine HCL (ATARAX) tablet 25 mg  25 mg Oral TID PRN    montelukast (SINGULAIR) tablet 10 mg  10 mg Oral DAILY    [Held by provider] nortriptyline (PAMELOR) capsule 10 mg  10 mg Oral QHS    pantoprazole (PROTONIX) tablet 40 mg  40 mg Oral DAILY    pravastatin (PRAVACHOL) tablet 40 mg  40 mg Oral DAILY    glucose chewable tablet 16 g  4 Tablet Oral PRN    glucagon (GLUCAGEN) injection 1 mg  1 mg IntraMUSCular PRN    acetaminophen (TYLENOL) tablet 650 mg  650 mg Oral Q6H PRN    Or    acetaminophen (TYLENOL) suppository 650 mg  650 mg Rectal Q6H PRN    polyethylene glycol (MIRALAX) packet 17 g  17 g Oral DAILY PRN    ondansetron (ZOFRAN ODT) tablet 4 mg  4 mg Oral Q8H PRN    Or    ondansetron (ZOFRAN) injection 4 mg  4 mg IntraVENous Q6H PRN    levothyroxine (SYNTHROID) tablet 75 mcg  75 mcg Oral ACB    DULoxetine (CYMBALTA) capsule 60 mg  60 mg Oral DAILY    gabapentin (NEURONTIN) capsule 400 mg  400 mg Oral BID    hydrOXYchloroQUINE (PLAQUENIL) tablet 200 mg  200 mg Oral DAILY WITH BREAKFAST    methylPREDNISolone (PF) (SOLU-MEDROL) injection 40 mg  40 mg IntraVENous Q6H         Clinical care time spent 30 minutes.       Willi Gill MD

## 2022-11-26 NOTE — PROGRESS NOTES
Progress Note    Patient: Jodie Waite MRN: [id number]  SSN: [id number]    YOB: 1950  Age: 70 y.o. Sex: female      Admit Date: 11/22/2022    LOS: 4 days     Subjective:     70years old patient with severe COPD and paroxysmal atrial fibrillation    Objective:     Vitals:    11/26/22 1200 11/26/22 1535 11/26/22 1551 11/26/22 1600   BP:  (!) 127/48     Pulse: 92 98 (!) 106 (!) 106   Resp:  26     Temp:  97.4 °F (36.3 °C)     SpO2:  97%     Weight:       Height:            Intake and Output:  Current Shift: No intake/output data recorded. Last three shifts: No intake/output data recorded. Physical Exam:   General:  Alert, cooperative, no distress, appears stated age. Eyes:  Conjunctivae/corneas clear. PERRL, EOMs intact. Fundi benign   Ears:  Normal TMs and external ear canals both ears. Nose: Nares normal. Septum midline. Mucosa normal. No drainage or sinus tenderness. Mouth/Throat: Lips, mucosa, and tongue normal. Teeth and gums normal.   Neck: Supple, symmetrical, trachea midline, no adenopathy, thyroid: no enlargment/tenderness/nodules, no carotid bruit and no JVD. Back:   Symmetric, no curvature. ROM normal. No CVA tenderness. Lungs:   Clear to auscultation bilaterally. Heart:  Regular rate and rhythm, S1, S2 normal, no murmur, click, rub or gallop. Abdomen:   Soft, non-tender. Bowel sounds normal. No masses,  No organomegaly. Extremities: Extremities normal, atraumatic, no cyanosis or edema. Pulses: 2+ and symmetric all extremities. Skin: Skin color, texture, turgor normal. No rashes or lesions   Lymph nodes: Cervical, supraclavicular, and axillary nodes normal.   Neurologic: CNII-XII intact. Normal strength, sensation and reflexes throughout. Lab/Data Review: All lab results for the last 24 hours reviewed.      Recent Results (from the past 24 hour(s))   METABOLIC PANEL, BASIC    Collection Time: 11/26/22  7:17 AM   Result Value Ref Range    Sodium 136 136 - 145 mmol/L    Potassium 4.8 3.5 - 5.1 mmol/L    Chloride 101 97 - 108 mmol/L    CO2 31 21 - 32 mmol/L    Anion gap 4 (L) 5 - 15 mmol/L    Glucose 152 (H) 65 - 100 mg/dL    BUN 19 6 - 20 mg/dL    Creatinine 0.68 0.55 - 1.02 mg/dL    BUN/Creatinine ratio 28 (H) 12 - 20      eGFR >60 >60 ml/min/1.73m2    Calcium 8.7 8.5 - 10.1 mg/dL   MAGNESIUM    Collection Time: 11/26/22  7:17 AM   Result Value Ref Range    Magnesium 2.7 (H) 1.6 - 2.4 mg/dL         Assessment:     Active Problems:    Atrial fibrillation with RVR ([facility]) (11/22/2022)      Atrial fibrillation ([facility]) (11/22/2022)        Plan:     Continue present treatment    Signed By: Germania Hernandez MD     November 26, 2022

## 2022-11-26 NOTE — PROGRESS NOTES
Problem: Pressure Injury - Risk of  Goal: *Prevention of pressure injury  Description: Document Ismael Scale and appropriate interventions in the flowsheet. Outcome: Progressing Towards Goal  Note: Pressure Injury Interventions:  Sensory Interventions: Keep linens dry and wrinkle-free, Minimize linen layers         Activity Interventions: Increase time out of bed    Mobility Interventions: PT/OT evaluation    Nutrition Interventions: Document food/fluid/supplement intake                     Problem: Patient Education: Go to Patient Education Activity  Goal: Patient/Family Education  Outcome: Progressing Towards Goal     Problem: Falls - Risk of  Goal: *Absence of Falls  Description: Document Ghazal Fall Risk and appropriate interventions in the flowsheet.   Outcome: Progressing Towards Goal  Note: Fall Risk Interventions:  Mobility Interventions: Patient to call before getting OOB         Medication Interventions: Patient to call before getting OOB                   Problem: Patient Education: Go to Patient Education Activity  Goal: Patient/Family Education  Outcome: Progressing Towards Goal     Problem: Discharge Planning  Goal: *Discharge to safe environment  Outcome: Progressing Towards Goal  Goal: *Knowledge of medication management  Outcome: Progressing Towards Goal  Goal: *Knowledge of discharge instructions  Outcome: Progressing Towards Goal     Problem: Patient Education: Go to Patient Education Activity  Goal: Patient/Family Education  Outcome: Progressing Towards Goal     Problem: Arrhythmia Pathway (Adult)  Goal: *Absence of arrhythmia  Outcome: Progressing Towards Goal     Problem: Patient Education: Go to Patient Education Activity  Goal: Patient/Family Education  Outcome: Progressing Towards Goal

## 2022-11-26 NOTE — PROGRESS NOTES
Pulmonary and critical care progress note       Reason for consultation:  Acute respiratory failure with hypoxia and atrial fibrillation with rapid ventricular response        Patient is a 70y.o. year old female with signal has a past medical history of chronic A. fib not on anticoagulation secondary to bleed COPD hypothyroidism hyperlipidemia chronic pain neuropathy     Seen and examined  Overnight events noted  Lying in bed comfortably  On 3 L nasal cannula oxygen  No acute distress  As of shortness of breath when she comes out of bed to use her bedside commode. Remains in A. fib with elevated ventricular response  Likely to be placed back on IV Cardizem drip      Prior to Admission medications    Medication Sig Start Date End Date Taking? Authorizing Provider   gabapentin (NEURONTIN) 600 mg tablet Take 600 mg by mouth three (3) times daily. Yes Provider, Historical   hydrOXYzine HCL (ATARAX) 25 mg tablet Take 25 mg by mouth three (3) times daily as needed. Provider, Historical   pantoprazole (PROTONIX) 40 mg tablet Take 40 mg by mouth daily. Provider, Historical   pravastatin (PRAVACHOL) 40 mg tablet Take 40 mg by mouth daily. 7/2/18   Provider, Historical   nortriptyline (PAMELOR) 10 mg capsule Take 10 mg by mouth nightly. Provider, Historical   montelukast (SINGULAIR) 10 mg tablet Take 10 mg by mouth daily. Provider, Historical   acetaminophen (TYLENOL EXTRA STRENGTH) 500 mg tablet Take 500 mg by mouth every six (6) hours as needed. Provider, Historical   levothyroxine (SYNTHROID) 50 mcg tablet Take 50 mcg by mouth Daily (before breakfast). Provider, Historical   dilTIAZem ER (CARDIZEM CD) 120 mg capsule Take 120 mg by mouth daily. Provider, Historical   citalopram (CELEXA) 20 mg tablet Take 20 mg by mouth daily. Provider, Historical   fluticasone-salmeterol (ADVAIR DISKUS) 250-50 mcg/Dose diskus inhaler Take 1 Puff by inhalation every twelve (12) hours.     Provider, Historical IPRATROPIUM/ALBUTEROL SULFATE (COMBIVENT IN) Take 14.7 g by inhalation four (4) times daily. 10/22/10   Provider, Historical   calcium-vitamin D (OYSTER SHELL) 500 mg(1,250mg) -200 unit per tablet Take 1 Tab by mouth two (2) times daily (with meals).     Provider, Historical         Current Facility-Administered Medications:     albuterol-ipratropium (DUO-NEB) 2.5 MG-0.5 MG/3 ML, 3 mL, Nebulization, Q6H RT, Tyrone Olivo MD    dilTIAZem ER (CARDIZEM CD) capsule 240 mg, 240 mg, Oral, BID, Paco Tucker MD    polyethylene glycol (MIRALAX) packet 17 g, 17 g, Oral, DAILY, Asim Flores MD, 17 g at 11/25/22 0902    cefTRIAXone (ROCEPHIN) 1 g in sterile water (preservative free) 10 mL IV syringe, 1 g, IntraVENous, Q24H, Asim Flores MD, 1 g at 11/26/22 1237    doxycycline (VIBRAMYCIN) 100 mg in 0.9% sodium chloride (MBP/ADV) 100 mL MBP, 100 mg, IntraVENous, Q12H, Tyrone Olivo MD, Last Rate: 100 mL/hr at 11/26/22 1237, 100 mg at 11/26/22 1237    metoprolol tartrate (LOPRESSOR) tablet 25 mg, 25 mg, Oral, BID, Nati Correa MD, 25 mg at 11/26/22 0813    apixaban (ELIQUIS) tablet 5 mg, 5 mg, Oral, BID, Nati Correa MD, 5 mg at 11/26/22 0813    budesonide-formoteroL (SYMBICORT) 160-4.5 mcg/actuation HFA inhaler 2 Puff, 2 Puff, Inhalation, BID, Asim Flores MD, 2 Puff at 11/26/22 0802    hydrOXYzine HCL (ATARAX) tablet 25 mg, 25 mg, Oral, TID PRN, Asim Flores MD    montelukast (SINGULAIR) tablet 10 mg, 10 mg, Oral, DAILY, Asim Flores MD, 10 mg at 11/26/22 4971    [Held by provider] nortriptyline (PAMELOR) capsule 10 mg, 10 mg, Oral, QHS, Asim Flores MD    pantoprazole (PROTONIX) tablet 40 mg, 40 mg, Oral, DAILY, Asim Flores MD, 40 mg at 11/26/22 0813    pravastatin (PRAVACHOL) tablet 40 mg, 40 mg, Oral, DAILY, Asim Flores MD, 40 mg at 11/26/22 0813    glucose chewable tablet 16 g, 4 Tablet, Oral, PRN, Asim Flores MD    glucagon (GLUCAGEN) injection 1 mg, 1 mg, IntraMUSCular, PRN, Ephraim Flores MD    acetaminophen (TYLENOL) tablet 650 mg, 650 mg, Oral, Q6H PRN, 650 mg at 11/24/22 2349 **OR** acetaminophen (TYLENOL) suppository 650 mg, 650 mg, Rectal, Q6H PRN, Asim Flores MD    polyethylene glycol (MIRALAX) packet 17 g, 17 g, Oral, DAILY PRN, Asim Flores MD, 17 g at 11/24/22 1636    ondansetron (ZOFRAN ODT) tablet 4 mg, 4 mg, Oral, Q8H PRN **OR** ondansetron (ZOFRAN) injection 4 mg, 4 mg, IntraVENous, Q6H PRN, Asim Flores MD    levothyroxine (SYNTHROID) tablet 75 mcg, 75 mcg, Oral, ACB, Asim Flores MD, 75 mcg at 11/26/22 0813    DULoxetine (CYMBALTA) capsule 60 mg, 60 mg, Oral, DAILY, Asim Flores MD, 60 mg at 11/26/22 2981    gabapentin (NEURONTIN) capsule 400 mg, 400 mg, Oral, BID, Asim Flores MD, 400 mg at 11/26/22 2319    hydrOXYchloroQUINE (PLAQUENIL) tablet 200 mg, 200 mg, Oral, DAILY WITH BREAKFAST, Asim Flores MD, 200 mg at 11/26/22 0813    methylPREDNISolone (PF) (SOLU-MEDROL) injection 40 mg, 40 mg, IntraVENous, Q6H, Asim Flores MD, 40 mg at 11/26/22 1237    LAB DATA REVIEWED:    Recent Results (from the past 24 hour(s))   METABOLIC PANEL, BASIC    Collection Time: 11/26/22  7:17 AM   Result Value Ref Range    Sodium 136 136 - 145 mmol/L    Potassium 4.8 3.5 - 5.1 mmol/L    Chloride 101 97 - 108 mmol/L    CO2 31 21 - 32 mmol/L    Anion gap 4 (L) 5 - 15 mmol/L    Glucose 152 (H) 65 - 100 mg/dL    BUN 19 6 - 20 mg/dL    Creatinine 0.68 0.55 - 1.02 mg/dL    BUN/Creatinine ratio 28 (H) 12 - 20      eGFR >60 >60 ml/min/1.73m2    Calcium 8.7 8.5 - 10.1 mg/dL   MAGNESIUM    Collection Time: 11/26/22  7:17 AM   Result Value Ref Range    Magnesium 2.7 (H) 1.6 - 2.4 mg/dL       XR Results (most recent):  Results from Hospital Encounter encounter on 11/22/22    XR CHEST PORT    Narrative  PORTABLE CHEST RADIOGRAPH/S: 11/24/2022 3:53 AM    INDICATION: Respiratory failure.     COMPARISON: 11/22/2022, CT chest 11/23/2022. TECHNIQUE: Portable frontal semiupright radiograph/s of the chest.    FINDINGS:  The lungs are emphysematous, but clear. The central airways are patent. No  pneumothorax or pleural effusion. Impression  Emphysematous but clear lungs. XR CHEST PORT   Final Result   Emphysematous but clear lungs. CTA CHEST W OR W WO CONT   Final Result   1. No evidence of pulmonary embolus. 2.  Small right pleural effusion with mild right basilar airspace disease. 3. Centrilobular emphysema. 4. Cardiomegaly. XR CHEST PORT   Final Result      No acute process on portable chest.              Review of Systems:  Review of 12 systems was performed and is noncontributory other than as outlined above    Objective:   VITALS:    Visit Vitals  /72 (BP 1 Location: Left upper arm)   Pulse 92   Temp 97.6 °F (36.4 °C)   Resp 28   Ht 5' 4\" (1.626 m)   Wt 57.6 kg (127 lb)   SpO2 100%   Breastfeeding No   BMI 21.80 kg/m²       Physical Exam:   Constitutional: pt is oriented to person, place, and time. HENT:   Head: Normocephalic and atraumatic. Eyes: Pupils are equal, round, and reactive to light. EOM are normal.   Cardiovascular: Irregularly irregular, tachycardic  Pulmonary/Chest: Reduced air entry bilaterally with prolonged exhalation, occasional wheezing, basal crackles  Exhibits no tenderness. Abdominal: Soft. Bowel sounds are normal. There is no abdominal tenderness. There is no rebound and no guarding. Musculoskeletal: Normal range of motion. Neurological: pt is alert and oriented to person, place, and time. Alert. Normal strength. No cranial nerve deficit or sensory deficit. Displays a negative Romberg sign. ASSESSMENT:    1. Acute on chronic respiratory failure with hypoxia  2. Acute exacerbation of COPD  3. Paroxysmal atrial fibrillation with rapid ventricular response  4. Left-sided pleuritic chest pain  5. Hypertension  6.   Depression    Plan:    Continue nasal cannula oxygen as needed to keep saturation above 92%    Patient in rapid A. fib  Has been complaining of headache when she stands up. She may need AV node ablation with permanent pacemaker if rate control remains an issue . Left-sided pleuritic chest pain    CT angiogram of chest did not show any pulmonary embolism. Showed cardiomegaly and changes of emphysema. Acute exacerbation of COPD  Continue Solu-Medrol  Continue Rocephin  Added doxycycline for atypical coverage  Nebulizers as needed  Symbicort inhaler    Cardizem and atenolol. placed on Lasix 40 mg IV  Check electrolytes and replace as needed  Intake and output charting  Monitor renal function with fluid changes    Continue blood pressure medications  Cymbalta 60 mg daily  Neurontin 400 mg twice a day    DVT and GI prophylaxis    Questions of patient were answered at bedside in detail  Case discussed in detail with RN, RT, and care team  Thank you for involving me in the care of this patient  I will follow with you closely during hospitalization    Time spent more than 30 minutes under patient care with no overlap reviewing results and records, decision making, and answering questions.     Shahla Conte MD  [facility]

## 2022-11-27 PROCEDURE — 65270000029 HC RM PRIVATE

## 2022-11-27 PROCEDURE — 74011000250 HC RX REV CODE- 250: Performed by: INTERNAL MEDICINE

## 2022-11-27 PROCEDURE — 74011250636 HC RX REV CODE- 250/636: Performed by: FAMILY MEDICINE

## 2022-11-27 PROCEDURE — 74011000250 HC RX REV CODE- 250: Performed by: FAMILY MEDICINE

## 2022-11-27 PROCEDURE — 94761 N-INVAS EAR/PLS OXIMETRY MLT: CPT

## 2022-11-27 PROCEDURE — 74011250636 HC RX REV CODE- 250/636: Performed by: INTERNAL MEDICINE

## 2022-11-27 PROCEDURE — 74011250637 HC RX REV CODE- 250/637: Performed by: INTERNAL MEDICINE

## 2022-11-27 PROCEDURE — 74011000258 HC RX REV CODE- 258: Performed by: INTERNAL MEDICINE

## 2022-11-27 PROCEDURE — 94640 AIRWAY INHALATION TREATMENT: CPT

## 2022-11-27 PROCEDURE — 77010033678 HC OXYGEN DAILY

## 2022-11-27 PROCEDURE — 74011250637 HC RX REV CODE- 250/637: Performed by: FAMILY MEDICINE

## 2022-11-27 RX ORDER — IPRATROPIUM BROMIDE AND ALBUTEROL SULFATE 2.5; .5 MG/3ML; MG/3ML
3 SOLUTION RESPIRATORY (INHALATION)
Status: DISCONTINUED | OUTPATIENT
Start: 2022-11-27 | End: 2022-11-30 | Stop reason: HOSPADM

## 2022-11-27 RX ADMIN — METHYLPREDNISOLONE SODIUM SUCCINATE 40 MG: 40 INJECTION, POWDER, FOR SOLUTION INTRAMUSCULAR; INTRAVENOUS at 17:17

## 2022-11-27 RX ADMIN — GABAPENTIN 400 MG: 400 CAPSULE ORAL at 21:28

## 2022-11-27 RX ADMIN — DOXYCYCLINE 100 MG: 100 INJECTION, POWDER, LYOPHILIZED, FOR SOLUTION INTRAVENOUS at 23:16

## 2022-11-27 RX ADMIN — BUDESONIDE AND FORMOTEROL FUMARATE DIHYDRATE 2 PUFF: 160; 4.5 AEROSOL RESPIRATORY (INHALATION) at 07:48

## 2022-11-27 RX ADMIN — METHYLPREDNISOLONE SODIUM SUCCINATE 40 MG: 40 INJECTION, POWDER, FOR SOLUTION INTRAMUSCULAR; INTRAVENOUS at 05:21

## 2022-11-27 RX ADMIN — IPRATROPIUM BROMIDE AND ALBUTEROL SULFATE 3 ML: .5; 2.5 SOLUTION RESPIRATORY (INHALATION) at 01:50

## 2022-11-27 RX ADMIN — LEVOTHYROXINE SODIUM 75 MCG: 0.07 TABLET ORAL at 08:16

## 2022-11-27 RX ADMIN — METOPROLOL TARTRATE 25 MG: 25 TABLET, FILM COATED ORAL at 15:15

## 2022-11-27 RX ADMIN — DILTIAZEM HYDROCHLORIDE 240 MG: 120 CAPSULE, COATED, EXTENDED RELEASE ORAL at 21:28

## 2022-11-27 RX ADMIN — DULOXETINE HYDROCHLORIDE 60 MG: 30 CAPSULE, DELAYED RELEASE ORAL at 08:16

## 2022-11-27 RX ADMIN — ACETAMINOPHEN 650 MG: 325 TABLET ORAL at 02:14

## 2022-11-27 RX ADMIN — PANTOPRAZOLE SODIUM 40 MG: 40 TABLET, DELAYED RELEASE ORAL at 08:16

## 2022-11-27 RX ADMIN — METOPROLOL TARTRATE 25 MG: 25 TABLET, FILM COATED ORAL at 08:16

## 2022-11-27 RX ADMIN — APIXABAN 5 MG: 5 TABLET, FILM COATED ORAL at 21:28

## 2022-11-27 RX ADMIN — HYDROXYCHLOROQUINE SULFATE 200 MG: 200 TABLET, FILM COATED ORAL at 08:16

## 2022-11-27 RX ADMIN — METHYLPREDNISOLONE SODIUM SUCCINATE 40 MG: 40 INJECTION, POWDER, FOR SOLUTION INTRAMUSCULAR; INTRAVENOUS at 23:16

## 2022-11-27 RX ADMIN — CEFTRIAXONE SODIUM 1 G: 1 INJECTION, POWDER, FOR SOLUTION INTRAMUSCULAR; INTRAVENOUS at 11:43

## 2022-11-27 RX ADMIN — BUDESONIDE AND FORMOTEROL FUMARATE DIHYDRATE 2 PUFF: 160; 4.5 AEROSOL RESPIRATORY (INHALATION) at 19:19

## 2022-11-27 RX ADMIN — DILTIAZEM HYDROCHLORIDE 240 MG: 120 CAPSULE, COATED, EXTENDED RELEASE ORAL at 08:16

## 2022-11-27 RX ADMIN — IPRATROPIUM BROMIDE AND ALBUTEROL SULFATE 3 ML: .5; 2.5 SOLUTION RESPIRATORY (INHALATION) at 19:24

## 2022-11-27 RX ADMIN — GABAPENTIN 400 MG: 400 CAPSULE ORAL at 08:16

## 2022-11-27 RX ADMIN — PRAVASTATIN SODIUM 40 MG: 40 TABLET ORAL at 08:16

## 2022-11-27 RX ADMIN — MONTELUKAST 10 MG: 10 TABLET, FILM COATED ORAL at 08:16

## 2022-11-27 RX ADMIN — ACETAMINOPHEN 650 MG: 325 TABLET ORAL at 18:51

## 2022-11-27 RX ADMIN — DOXYCYCLINE 100 MG: 100 INJECTION, POWDER, LYOPHILIZED, FOR SOLUTION INTRAVENOUS at 11:43

## 2022-11-27 RX ADMIN — APIXABAN 5 MG: 5 TABLET, FILM COATED ORAL at 08:16

## 2022-11-27 RX ADMIN — METHYLPREDNISOLONE SODIUM SUCCINATE 40 MG: 40 INJECTION, POWDER, FOR SOLUTION INTRAMUSCULAR; INTRAVENOUS at 11:43

## 2022-11-27 NOTE — PROGRESS NOTES
Progress Note    Patient: Zach Morin MRN: [id number]  SSN: [id number]    YOB: 1950  Age: 70 y.o. Sex: female      Admit Date: 11/22/2022    LOS: 5 days     Subjective:     70years old patient with severe COPD and paroxysmal atrial fibrillation    Objective:     Vitals:    11/27/22 0748 11/27/22 0749 11/27/22 0800 11/27/22 1146   BP:  123/78  138/81   Pulse:  (!) 132 (!) 132 (!) 108   Resp:  26  28   Temp:  97.6 °F (36.4 °C)  97.3 °F (36.3 °C)   SpO2: 98% 96%  98%   Weight:       Height:            Intake and Output:  Current Shift: No intake/output data recorded. Last three shifts: No intake/output data recorded. Physical Exam:   General:  Alert, cooperative, no distress, appears stated age. Eyes:  Conjunctivae/corneas clear. PERRL, EOMs intact. Fundi benign   Ears:  Normal TMs and external ear canals both ears. Nose: Nares normal. Septum midline. Mucosa normal. No drainage or sinus tenderness. Mouth/Throat: Lips, mucosa, and tongue normal. Teeth and gums normal.   Neck: Supple, symmetrical, trachea midline, no adenopathy, thyroid: no enlargment/tenderness/nodules, no carotid bruit and no JVD. Back:   Symmetric, no curvature. ROM normal. No CVA tenderness. Lungs:   Clear to auscultation bilaterally. Heart:  Regular rate and rhythm, S1, S2 normal, no murmur, click, rub or gallop. Abdomen:   Soft, non-tender. Bowel sounds normal. No masses,  No organomegaly. Extremities: Extremities normal, atraumatic, no cyanosis or edema. Pulses: 2+ and symmetric all extremities. Skin: Skin color, texture, turgor normal. No rashes or lesions   Lymph nodes: Cervical, supraclavicular, and axillary nodes normal.   Neurologic: CNII-XII intact. Normal strength, sensation and reflexes throughout. Lab/Data Review: All lab results for the last 24 hours reviewed. No results found for this or any previous visit (from the past 24 hour(s)).         Assessment:     Active Problems:    Atrial fibrillation with RVR ([facility] [address] (11/22/2022)      Atrial fibrillation ([address] (11/22/2022)    COPD severe  Possible pneumonia  Plan:     Continue present treatment    Signed By: Phong Jeter MD     November 27, 2022

## 2022-11-27 NOTE — PROGRESS NOTES
Pulmonary and critical care progress note       Reason for consultation:  Acute respiratory failure with hypoxia and atrial fibrillation with rapid ventricular response        Patient is a 70y.o. year old female with signal has a past medical history of chronic A. fib not on anticoagulation secondary to bleed COPD hypothyroidism hyperlipidemia chronic pain neuropathy     Seen and examined  Overnight events noted  Has developed constipation. Trying to move bowels. Gets jittery with nebulizer treatment. We will discontinue nebs. Start on Spiriva On 3 L nasal cannula oxygen  No acute distress  Has of shortness of breath when she comes out of bed to use her bedside commode. Remains in A. fib with elevated ventricular response  Likely to be placed back on IV Cardizem drip      Prior to Admission medications    Medication Sig Start Date End Date Taking? Authorizing Provider   gabapentin (NEURONTIN) 600 mg tablet Take 600 mg by mouth three (3) times daily. Yes Provider, Historical   hydrOXYzine HCL (ATARAX) 25 mg tablet Take 25 mg by mouth three (3) times daily as needed. Provider, Historical   pantoprazole (PROTONIX) 40 mg tablet Take 40 mg by mouth daily. Provider, Historical   pravastatin (PRAVACHOL) 40 mg tablet Take 40 mg by mouth daily. 7/2/18   Provider, Historical   nortriptyline (PAMELOR) 10 mg capsule Take 10 mg by mouth nightly. Provider, Historical   montelukast (SINGULAIR) 10 mg tablet Take 10 mg by mouth daily. Provider, Historical   acetaminophen (TYLENOL EXTRA STRENGTH) 500 mg tablet Take 500 mg by mouth every six (6) hours as needed. Provider, Historical   levothyroxine (SYNTHROID) 50 mcg tablet Take 50 mcg by mouth Daily (before breakfast). Provider, Historical   dilTIAZem ER (CARDIZEM CD) 120 mg capsule Take 120 mg by mouth daily. Provider, Historical   citalopram (CELEXA) 20 mg tablet Take 20 mg by mouth daily.     Provider, Historical   fluticasone-salmeterol (ADVAIR DISKUS) 250-50 mcg/Dose diskus inhaler Take 1 Puff by inhalation every twelve (12) hours. Provider, Historical   IPRATROPIUM/ALBUTEROL SULFATE (COMBIVENT IN) Take 14.7 g by inhalation four (4) times daily. 10/22/10   Provider, Historical   calcium-vitamin D (OYSTER SHELL) 500 mg(1,250mg) -200 unit per tablet Take 1 Tab by mouth two (2) times daily (with meals).     Provider, Historical         Current Facility-Administered Medications:     albuterol-ipratropium (DUO-NEB) 2.5 MG-0.5 MG/3 ML, 3 mL, Nebulization, Q6H RT, Tyrone Olivo MD, 3 mL at 11/27/22 0150    dilTIAZem ER (CARDIZEM CD) capsule 240 mg, 240 mg, Oral, BID, Michelle Pittman MD, 240 mg at 11/27/22 0816    sodium chloride (OCEAN) 0.65 % nasal squeeze bottle 2 Spray, 2 Spray, Both Nostrils, Q2H PRN, Radha Merino MD    polyethylene glycol (MIRALAX) packet 17 g, 17 g, Oral, DAILY, Asim Flores MD, 17 g at 11/25/22 0902    doxycycline (VIBRAMYCIN) 100 mg in 0.9% sodium chloride (MBP/ADV) 100 mL MBP, 100 mg, IntraVENous, Q12H, Tyrone Olivo MD, Last Rate: 100 mL/hr at 11/27/22 1143, 100 mg at 11/27/22 1143    metoprolol tartrate (LOPRESSOR) tablet 25 mg, 25 mg, Oral, BID, J Carlos Barker MD, 25 mg at 11/27/22 0816    apixaban (ELIQUIS) tablet 5 mg, 5 mg, Oral, BID, J Carlos Barker MD, 5 mg at 11/27/22 0816    budesonide-formoteroL (SYMBICORT) 160-4.5 mcg/actuation HFA inhaler 2 Puff, 2 Puff, Inhalation, BID, Asim Flores MD, 2 Puff at 11/27/22 0748    hydrOXYzine HCL (ATARAX) tablet 25 mg, 25 mg, Oral, TID PRN, Asim Flores MD    montelukast (SINGULAIR) tablet 10 mg, 10 mg, Oral, DAILY, Asim Flores MD, 10 mg at 11/27/22 0816    [Held by provider] nortriptyline (PAMELOR) capsule 10 mg, 10 mg, Oral, QHS, Asim Flores MD    pantoprazole (PROTONIX) tablet 40 mg, 40 mg, Oral, DAILY, Asim Flores MD, 40 mg at 11/27/22 0816    pravastatin (PRAVACHOL) tablet 40 mg, 40 mg, Oral, DAILY, Asim Flores MD, 40 mg at 11/27/22 0816    glucose chewable tablet 16 g, 4 Tablet, Oral, PRN, Sharon Flores MD    glucagon (GLUCAGEN) injection 1 mg, 1 mg, IntraMUSCular, PRN, Sharon Flores MD    acetaminophen (TYLENOL) tablet 650 mg, 650 mg, Oral, Q6H PRN, 650 mg at 11/27/22 0214 **OR** acetaminophen (TYLENOL) suppository 650 mg, 650 mg, Rectal, Q6H PRN, Asim Flores MD    polyethylene glycol (MIRALAX) packet 17 g, 17 g, Oral, DAILY PRN, Asim Flores MD, 17 g at 11/24/22 1636    ondansetron (ZOFRAN ODT) tablet 4 mg, 4 mg, Oral, Q8H PRN **OR** ondansetron (ZOFRAN) injection 4 mg, 4 mg, IntraVENous, Q6H PRN, Asim Flores MD    levothyroxine (SYNTHROID) tablet 75 mcg, 75 mcg, Oral, ACB, Asim Flores MD, 75 mcg at 11/27/22 0816    DULoxetine (CYMBALTA) capsule 60 mg, 60 mg, Oral, DAILY, Asim Flores MD, 60 mg at 11/27/22 0816    gabapentin (NEURONTIN) capsule 400 mg, 400 mg, Oral, BID, Asim Flores MD, 400 mg at 11/27/22 9027    hydrOXYchloroQUINE (PLAQUENIL) tablet 200 mg, 200 mg, Oral, DAILY WITH BREAKFAST, Asim Flores MD, 200 mg at 11/27/22 0816    methylPREDNISolone (PF) (SOLU-MEDROL) injection 40 mg, 40 mg, IntraVENous, Q6H, Asim Flores MD, 40 mg at 11/27/22 1143    LAB DATA REVIEWED:    No results found for this or any previous visit (from the past 24 hour(s)). XR Results (most recent):  Results from Hospital Encounter encounter on 11/22/22    XR CHEST PORT    Narrative  PORTABLE CHEST RADIOGRAPH/S: 11/24/2022 3:53 AM    INDICATION: Respiratory failure. COMPARISON: 11/22/2022, CT chest 11/23/2022. TECHNIQUE: Portable frontal semiupright radiograph/s of the chest.    FINDINGS:  The lungs are emphysematous, but clear. The central airways are patent. No  pneumothorax or pleural effusion. Impression  Emphysematous but clear lungs. XR CHEST PORT   Final Result   Emphysematous but clear lungs. CTA CHEST W OR W WO CONT   Final Result   1.   No evidence of pulmonary embolus. 2.  Small right pleural effusion with mild right basilar airspace disease. 3. Centrilobular emphysema. 4. Cardiomegaly. XR CHEST PORT   Final Result      No acute process on portable chest.              Review of Systems:  Review of 12 systems was performed and is noncontributory other than as outlined above    Objective:   VITALS:    Visit Vitals  /81 (BP 1 Location: Right upper arm, BP Patient Position: Semi fowlers)   Pulse (!) 108   Temp 97.3 °F (36.3 °C)   Resp 28   Ht 5' 4\" (1.626 m)   Wt 60.3 kg (133 lb)   SpO2 98%   Breastfeeding No   BMI 22.83 kg/m²       Physical Exam:   Constitutional: pt is oriented to person, place, and time. HENT:   Head: Normocephalic and atraumatic. Eyes: Pupils are equal, round, and reactive to light. EOM are normal.   Cardiovascular: Irregularly irregular, tachycardic  Pulmonary/Chest: Reduced air entry bilaterally with prolonged exhalation, occasional wheezing, basal crackles  Exhibits no tenderness. Abdominal: Soft. Bowel sounds are normal. There is no abdominal tenderness. There is no rebound and no guarding. Musculoskeletal: Normal range of motion. Neurological: pt is alert and oriented to person, place, and time. Alert. Normal strength. No cranial nerve deficit or sensory deficit. Displays a negative Romberg sign. ASSESSMENT:    1. Acute on chronic respiratory failure with hypoxia  2. Acute exacerbation of COPD  3. Paroxysmal atrial fibrillation with rapid ventricular response  4. Left-sided pleuritic chest pain  5. Hypertension  6. Depression    Plan:    Continue nasal cannula oxygen as needed to keep saturation above 92%    Patient in rapid A. ora  Has been complaining of headache when she stands up. She may need AV node ablation with permanent pacemaker if rate control remains an issue . Left-sided pleuritic chest pain    CT angiogram of chest did not show any pulmonary embolism.   Showed cardiomegaly and changes of emphysema. Acute exacerbation of COPD  Continue Solu-Medrol  Continue Rocephin  Added doxycycline for atypical coverage  D/C Nebulizers as it is causing jitteryness. Start Spiriva. Cardizem and atenolol. placed on Lasix 40 mg IV  Check electrolytes and replace as needed  Intake and output charting  Monitor renal function with fluid changes    Continue blood pressure medications  Cymbalta 60 mg daily  Neurontin 400 mg twice a day    DVT and GI prophylaxis    Questions of patient were answered at bedside in detail  Case discussed in detail with RN, RT, and care team  Thank you for involving me in the care of this patient  I will follow with you closely during hospitalization    Time spent more than 30 minutes under patient care with no overlap reviewing results and records, decision making, and answering questions.     Uriah Hernandez MD  [facility]

## 2022-11-27 NOTE — PROGRESS NOTES
Problem: Pressure Injury - Risk of  Goal: *Prevention of pressure injury  Description: Document Ismael Scale and appropriate interventions in the flowsheet. Outcome: Progressing Towards Goal  Note: Pressure Injury Interventions:  Sensory Interventions: Assess changes in LOC         Activity Interventions: PT/OT evaluation    Mobility Interventions: PT/OT evaluation, HOB 30 degrees or less    Nutrition Interventions: Document food/fluid/supplement intake                     Problem: Patient Education: Go to Patient Education Activity  Goal: Patient/Family Education  Outcome: Progressing Towards Goal     Problem: Falls - Risk of  Goal: *Absence of Falls  Description: Document Ghazal Fall Risk and appropriate interventions in the flowsheet.   Outcome: Progressing Towards Goal  Note: Fall Risk Interventions:  Mobility Interventions: Bed/chair exit alarm, OT consult for ADLs, PT Consult for mobility concerns, PT Consult for assist device competence         Medication Interventions: Teach patient to arise slowly, Patient to call before getting OOB, Bed/chair exit alarm    Elimination Interventions: Call light in reach

## 2022-11-28 LAB
ARTERIAL PATENCY WRIST A: YES
BASE EXCESS BLDA CALC-SCNC: 2.1 MMOL/L (ref 0–3)
BDY SITE: ABNORMAL
BODY TEMPERATURE: 98.6
COHGB MFR BLD: 0.3 % (ref 1–2)
FIO2 ON VENT: 32 %
GAS FLOW.O2 O2 DELIVERY SYS: 3 L/MIN
HCO3 BLDA-SCNC: 29 MMOL/L (ref 22–26)
METHGB MFR BLD: 0.3 % (ref 0–1.4)
OXYHGB MFR BLD: 96.3 % (ref 95–99)
PCO2 BLDA: 59 MMHG (ref 35–45)
PERFORMED BY, TECHID: ABNORMAL
PH BLDA: 7.31 [PH] (ref 7.35–7.45)
PO2 BLDA: 99 MMHG (ref 80–100)
SAO2 % BLD: 97 % (ref 95–99)
SAO2% DEVICE SAO2% SENSOR NAME: ABNORMAL
SPECIMEN SITE: ABNORMAL

## 2022-11-28 PROCEDURE — 82803 BLOOD GASES ANY COMBINATION: CPT

## 2022-11-28 PROCEDURE — 74011250637 HC RX REV CODE- 250/637: Performed by: FAMILY MEDICINE

## 2022-11-28 PROCEDURE — 74011250636 HC RX REV CODE- 250/636: Performed by: FAMILY MEDICINE

## 2022-11-28 PROCEDURE — 74011250637 HC RX REV CODE- 250/637: Performed by: INTERNAL MEDICINE

## 2022-11-28 PROCEDURE — 74011250636 HC RX REV CODE- 250/636: Performed by: INTERNAL MEDICINE

## 2022-11-28 PROCEDURE — 94761 N-INVAS EAR/PLS OXIMETRY MLT: CPT

## 2022-11-28 PROCEDURE — 94660 CPAP INITIATION&MGMT: CPT

## 2022-11-28 PROCEDURE — 77010033678 HC OXYGEN DAILY

## 2022-11-28 PROCEDURE — 74011000258 HC RX REV CODE- 258: Performed by: INTERNAL MEDICINE

## 2022-11-28 PROCEDURE — 94640 AIRWAY INHALATION TREATMENT: CPT

## 2022-11-28 PROCEDURE — 65270000029 HC RM PRIVATE

## 2022-11-28 PROCEDURE — 36600 WITHDRAWAL OF ARTERIAL BLOOD: CPT

## 2022-11-28 RX ADMIN — PANTOPRAZOLE SODIUM 40 MG: 40 TABLET, DELAYED RELEASE ORAL at 08:44

## 2022-11-28 RX ADMIN — METOPROLOL TARTRATE 25 MG: 25 TABLET, FILM COATED ORAL at 15:59

## 2022-11-28 RX ADMIN — BUDESONIDE AND FORMOTEROL FUMARATE DIHYDRATE 2 PUFF: 160; 4.5 AEROSOL RESPIRATORY (INHALATION) at 08:02

## 2022-11-28 RX ADMIN — DOXYCYCLINE 100 MG: 100 INJECTION, POWDER, LYOPHILIZED, FOR SOLUTION INTRAVENOUS at 11:30

## 2022-11-28 RX ADMIN — DULOXETINE HYDROCHLORIDE 60 MG: 30 CAPSULE, DELAYED RELEASE ORAL at 08:44

## 2022-11-28 RX ADMIN — METHYLPREDNISOLONE SODIUM SUCCINATE 40 MG: 40 INJECTION, POWDER, FOR SOLUTION INTRAMUSCULAR; INTRAVENOUS at 06:32

## 2022-11-28 RX ADMIN — GABAPENTIN 400 MG: 400 CAPSULE ORAL at 21:27

## 2022-11-28 RX ADMIN — METOPROLOL TARTRATE 25 MG: 25 TABLET, FILM COATED ORAL at 08:44

## 2022-11-28 RX ADMIN — DOXYCYCLINE 100 MG: 100 INJECTION, POWDER, LYOPHILIZED, FOR SOLUTION INTRAVENOUS at 23:55

## 2022-11-28 RX ADMIN — PRAVASTATIN SODIUM 40 MG: 40 TABLET ORAL at 08:44

## 2022-11-28 RX ADMIN — DILTIAZEM HYDROCHLORIDE 240 MG: 120 CAPSULE, COATED, EXTENDED RELEASE ORAL at 21:27

## 2022-11-28 RX ADMIN — IPRATROPIUM BROMIDE AND ALBUTEROL SULFATE 3 ML: .5; 2.5 SOLUTION RESPIRATORY (INHALATION) at 13:19

## 2022-11-28 RX ADMIN — GABAPENTIN 400 MG: 400 CAPSULE ORAL at 08:44

## 2022-11-28 RX ADMIN — LEVOTHYROXINE SODIUM 75 MCG: 0.07 TABLET ORAL at 08:44

## 2022-11-28 RX ADMIN — MONTELUKAST 10 MG: 10 TABLET, FILM COATED ORAL at 08:44

## 2022-11-28 RX ADMIN — METHYLPREDNISOLONE SODIUM SUCCINATE 40 MG: 40 INJECTION, POWDER, FOR SOLUTION INTRAMUSCULAR; INTRAVENOUS at 11:30

## 2022-11-28 RX ADMIN — APIXABAN 5 MG: 5 TABLET, FILM COATED ORAL at 08:44

## 2022-11-28 RX ADMIN — HYDROXYCHLOROQUINE SULFATE 200 MG: 200 TABLET, FILM COATED ORAL at 08:44

## 2022-11-28 RX ADMIN — DILTIAZEM HYDROCHLORIDE 240 MG: 120 CAPSULE, COATED, EXTENDED RELEASE ORAL at 08:44

## 2022-11-28 RX ADMIN — APIXABAN 5 MG: 5 TABLET, FILM COATED ORAL at 21:27

## 2022-11-28 RX ADMIN — TIOTROPIUM BROMIDE INHALATION SPRAY 2 PUFF: 3.12 SPRAY, METERED RESPIRATORY (INHALATION) at 08:03

## 2022-11-28 RX ADMIN — ACETAMINOPHEN 650 MG: 325 TABLET ORAL at 01:05

## 2022-11-28 NOTE — PROGRESS NOTES
Doctor ordered continuous bipap. RT put patient on bipap and patient took it right off and refusing to wear it.

## 2022-11-28 NOTE — PROGRESS NOTES
General Daily Progress Note          Patient Name:   Gentry Carpenter       YOB: 1950       Age:  67 y.o. Admit Date: 11/22/2022      Subjective:     Patient is a 70y.o. year old female with signal past medical history of chronic A. fib not on anticoagulation secondary to bleed COPD hypothyroidism hyperlipidemia chronic pain neuropathy came to emergency room  Complaining of shortness of breath for last 2 weeks and worsening patient normally at 3 L oxygen by nasal cannula for COPD at home Emergency room seen by the ER physician work-up and showed patient on A. fib with rapid ventricular rate patient received 10 mg of IV Cardizem twice but patient still in rapid ventricular rate patient on Cardizem drip cardiology was consulted by the ER physician cardiology recommended patient to be admitted for further evaluation and treatment patient denies any fever chills has not shortness of breath no nausea no vomiting  In the ER blood work done BNP of 1300      Patient still on Cardizem drip heart rate fluctuating       11/24  Patient is resting comfortably in bed and has no complaints. States breathing treatments help. D Dimer 0.61  Elevated WBC count 15.7K    11/28  A. Fib with RVR, followed by cardiology, on Diltiazem 240 mg twice daily. Today patient states she feels sleepy, but has no other complaints. ECHO results    Left Ventricle: Normal left ventricular systolic function with a visually estimated EF of 50 - 55%. Left ventricle is smaller than normal. Mild septal thickening. Normal wall motion. Mitral Valve: Mild regurgitation. Tricuspid Valve: The estimated RVSP is 47 mmHg.     Objective:     Visit Vitals  /72 (BP 1 Location: Right upper arm, BP Patient Position: At rest)   Pulse (!) 116   Temp 97.8 °F (36.6 °C)   Resp 19   Ht 5' 4\" (1.626 m)   Wt 60.3 kg (133 lb)   SpO2 97%   Breastfeeding No   BMI 22.83 kg/m²        No results found for this or any previous visit (from the past 24 hour(s)). [unfilled]      Review of Systems    Constitutional: Negative for chills and fever. HENT: Negative. Eyes: Negative. Respiratory: Negative. Cardiovascular: Negative. Gastrointestinal: Negative for abdominal pain and nausea. Skin: Negative. Neurological: Negative. Physical Exam:      Constitutional: pt is oriented to person, place, and time. HENT:   Head: Normocephalic and atraumatic. Eyes: Pupils are equal, round, and reactive to light. EOM are normal.   Cardiovascular: Normal rate, regular rhythm and normal heart sounds. Pulmonary/Chest: Breath sounds normal. No wheezes. No rales. Exhibits no tenderness. Abdominal: Soft. Bowel sounds are normal. There is no abdominal tenderness. There is no rebound and no guarding. Musculoskeletal: Normal range of motion. Neurological: pt is alert and oriented to person, place, and time. XR CHEST PORT   Final Result   Emphysematous but clear lungs. CTA CHEST W OR W WO CONT   Final Result   1. No evidence of pulmonary embolus. 2.  Small right pleural effusion with mild right basilar airspace disease. 3. Centrilobular emphysema. 4. Cardiomegaly. XR CHEST PORT   Final Result      No acute process on portable chest.              No results found for this or any previous visit (from the past 24 hour(s)).       Results       Procedure Component Value Units Date/Time    CULTURE, URINE [[id number]] Collected: 11/23/22 1555    Order Status: Completed Specimen: Urine Updated: 11/25/22 1236     Special Requests: No Special Requests        Culture result: No Growth (<1000 cfu/mL)       CULTURE, BLOOD, PAIRED [[id number]]     Order Status: Canceled Specimen: Blood     COVID-19 RAPID TEST [[id number]]     Order Status: Canceled     INFLUENZA A & B AG (RAPID TEST) [[id number]]     Order Status: Canceled Specimen: Nasopharyngeal              Labs:     No results for input(s): WBC, HGB, HCT, PLT, HGBEXT, HCTEXT, PLTEXT, HGBEXT, HCTEXT, PLTEXT in the last 72 hours. Recent Labs     11/26/22  0717      K 4.8      CO2 31   BUN 19   CREA 0.68   *   CA 8.7   MG 2.7*       No results for input(s): ALT, AP, TBIL, TBILI, TP, ALB, GLOB, GGT, AML, LPSE in the last 72 hours. No lab exists for component: SGOT, GPT, AMYP, HLPSE    No results for input(s): INR, PTP, APTT, INREXT, INREXT in the last 72 hours. No results for input(s): FE, TIBC, PSAT, FERR in the last 72 hours. Lab Results   Component Value Date/Time    Folate 12.3 07/12/2018 08:10 AM        No results for input(s): PH, PCO2, PO2 in the last 72 hours. No results for input(s): CPK, CKNDX, TROIQ in the last 72 hours.     No lab exists for component: CPKMB  No results found for: CHOL, CHOLX, CHLST, CHOLV, HDL, HDLP, LDL, LDLC, DLDLP, TGLX, TRIGL, TRIGP, CHHD, CHHDX  No results found for: [facility]  Lab Results   Component Value Date/Time    Color Yellow/Straw 11/22/2022 04:12 PM    Appearance Clear 11/22/2022 04:12 PM    Specific gravity 1.005 11/22/2022 04:12 PM    pH (UA) 7.0 11/22/2022 04:12 PM    Protein Negative 11/22/2022 04:12 PM    Glucose Negative 11/22/2022 04:12 PM    Ketone Negative 11/22/2022 04:12 PM    Bilirubin Negative 11/22/2022 04:12 PM    Urobilinogen 0.1 11/22/2022 04:12 PM    Nitrites Negative 11/22/2022 04:12 PM    Leukocyte Esterase Moderate (A) 11/22/2022 04:12 PM    Bacteria Negative 11/22/2022 04:12 PM    Bacteria Negative 11/22/2022 04:12 PM    WBC 20-50 11/22/2022 04:12 PM    WBC 20-50 11/22/2022 04:12 PM    RBC 0-5 11/22/2022 04:12 PM    RBC 0-5 11/22/2022 04:12 PM         Assessment:     KP. ora with rapid ventricular rate  COPD  Chronic hypoxic respiratory failure on 3 L  Hypertension  Hypothyroidism  Depression  Neuropathy  UTI  Elevated WBC count      Plan:     Cardizem 240 mg twice a day  COPD follow-up with pulmonologist  Continue other home medications  Continue Lasix 40 mg  Continue Solumedrol, nebulizers as needed  Continue doxycycline     Continue to monitor heart rate        Current Facility-Administered Medications:     tiotropium bromide (SPIRIVA RESPIMAT) 2.5 mcg Amish [place], 2 [place], Inhalation, DAILY, Americo Aguilera MD, 2 Puff at 11/28/22 0803    albuterol-ipratropium (DUO-NEB) 2.5 MG-0.5 MG/3 ML, 3 mL, Nebulization, Q6H PRN, Asim Flores MD, 3 mL at 11/27/22 1924    dilTIAZem ER (CARDIZEM CD) capsule 240 mg, 240 mg, Oral, BID, Alex Chris MD, 240 mg at 11/27/22 2128    sodium chloride (OCEAN) 0.65 % nasal squeeze bottle 2 Spray, 2 Spray, Both Nostrils, Q2H PRN, Americo Aguilera MD    polyethylene glycol (MIRALAX) packet 17 g, 17 g, Oral, DAILY, Asim Flores MD, 17 g at 11/25/22 0902    doxycycline (VIBRAMYCIN) 100 mg in 0.9% sodium chloride (MBP/ADV) 100 mL MBP, 100 mg, IntraVENous, Q12H, Tyrone Olivo MD, Last Rate: 100 mL/hr at 11/27/22 2316, 100 mg at 11/27/22 2316    metoprolol tartrate (LOPRESSOR) tablet 25 mg, 25 mg, Oral, BID, Rolando Degroot MD, 25 mg at 11/27/22 1515    apixaban (ELIQUIS) tablet 5 mg, 5 mg, Oral, BID, Rolando Degroot MD, 5 mg at 11/27/22 2128    budesonide-formoteroL (SYMBICORT) 160-4.5 mcg/actuation HFA inhaler 2 Puff, 2 Puff, Inhalation, BID, Asim Flores MD, 2 Puff at 11/28/22 0802    hydrOXYzine HCL (ATARAX) tablet 25 mg, 25 mg, Oral, TID PRN, Asim Flores MD    montelukast (SINGULAIR) tablet 10 mg, 10 mg, Oral, DAILY, Asim Flores MD, 10 mg at 11/27/22 0816    [Held by provider] nortriptyline (PAMELOR) capsule 10 mg, 10 mg, Oral, QHS, Asim Flores MD    pantoprazole (PROTONIX) tablet 40 mg, 40 mg, Oral, DAILY, Asim Flores MD, 40 mg at 11/27/22 0816    pravastatin (PRAVACHOL) tablet 40 mg, 40 mg, Oral, DAILY, Asim Flores MD, 40 mg at 11/27/22 0816    glucose chewable tablet 16 g, 4 Tablet, Oral, PRN, Asim Flores MD    glucagon (GLUCAGEN) injection 1 mg, 1 mg, IntraMUSCular, PRN, Mohiuddin, Corrinne Cords, MD    acetaminophen (TYLENOL) tablet 650 mg, 650 mg, Oral, Q6H PRN, 650 mg at 11/28/22 0105 **OR** acetaminophen (TYLENOL) suppository 650 mg, 650 mg, Rectal, Q6H PRN, Asim Flores MD    polyethylene glycol (MIRALAX) packet 17 g, 17 g, Oral, DAILY PRN, Asim Flores MD, 17 g at 11/24/22 1636    ondansetron (ZOFRAN ODT) tablet 4 mg, 4 mg, Oral, Q8H PRN **OR** ondansetron (ZOFRAN) injection 4 mg, 4 mg, IntraVENous, Q6H PRN, Asim Flores MD    levothyroxine (SYNTHROID) tablet 75 mcg, 75 mcg, Oral, ACB, Asim Flores MD, 75 mcg at 11/27/22 0816    DULoxetine (CYMBALTA) capsule 60 mg, 60 mg, Oral, DAILY, Asim Flores MD, 60 mg at 11/27/22 0816    gabapentin (NEURONTIN) capsule 400 mg, 400 mg, Oral, BID, Asim Flores MD, 400 mg at 11/27/22 2128    hydrOXYchloroQUINE (PLAQUENIL) tablet 200 mg, 200 mg, Oral, DAILY WITH BREAKFAST, Asim Flores MD, 200 mg at 11/27/22 0816    methylPREDNISolone (PF) (SOLU-MEDROL) injection 40 mg, 40 mg, IntraVENous, Q6H, Asim Flores MD, 40 mg at 11/28/22 6242

## 2022-11-28 NOTE — PROGRESS NOTES
General Daily Progress Note          Patient Name:   Ilana Reid       YOB: 1950       Age:  67 y.o. Admit Date: 11/22/2022      Subjective:     Patient is a 70y.o. year old female with signal past medical history of chronic A. fib not on anticoagulation secondary to bleed COPD hypothyroidism hyperlipidemia chronic pain neuropathy came to emergency room  Complaining of shortness of breath for last 2 weeks and worsening patient normally at 3 L oxygen by nasal cannula for COPD at home Emergency room seen by the ER physician work-up and showed patient on A. fib with rapid ventricular rate patient received 10 mg of IV Cardizem twice but patient still in rapid ventricular rate patient on Cardizem drip cardiology was consulted by the ER physician cardiology recommended patient to be admitted for further evaluation and treatment patient denies any fever chills has not shortness of breath no nausea no vomiting  In the ER blood work done BNP of 1300      Patient still on Cardizem drip heart rate fluctuating       11/24  Patient is resting comfortably in bed and has no complaints. States breathing treatments help. D Dimer 0.61  Elevated WBC count 15.7K    11/28  A. Fib with RVR, followed by cardiology, on Diltiazem 240 mg twice daily. Today patient states she feels sleepy, but has no other complaints. ECHO results    Left Ventricle: Normal left ventricular systolic function with a visually estimated EF of 50 - 55%. Left ventricle is smaller than normal. Mild septal thickening. Normal wall motion. Mitral Valve: Mild regurgitation. Tricuspid Valve: The estimated RVSP is 47 mmHg.     Objective:     Visit Vitals  /63 (BP 1 Location: Left upper arm, BP Patient Position: At rest)   Pulse 70   Temp 98.2 °F (36.8 °C)   Resp 18   Ht 5' 4\" (1.626 m)   Wt 60.3 kg (133 lb)   SpO2 93%   Breastfeeding No   BMI 22.83 kg/m²        Recent Results (from the past 24 hour(s))   BLOOD GAS, ARTERIAL    Collection Time: 11/28/22 10:03 AM   Result Value Ref Range    pH 7.31 (L) 7.35 - 7.45      PCO2 59 (H) 35 - 45 mmHg    PO2 99 80 - 100 mmHg    O2 SATURATION 97 95 - 99 %    BICARBONATE 29 (H) 22 - 26 mmol/L    BASE EXCESS 2.1 0 - 3 mmol/L    O2 METHOD Nasal Cannula      O2 FLOW RATE 3.00 L/min    FIO2 32 %    Sample source Arterial      SITE Right Radial      GUMARO'S TEST YES      Carboxy-Hgb 0.3 (L) 1 - 2 %    Methemoglobin 0.3 0 - 1.4 %    Oxyhemoglobin 96.3 95 - 99 %    Performed by Chinomnso Patrice     TEMPERATURE 98.6         [unfilled]      Review of Systems    Constitutional: Negative for chills and fever. HENT: Negative. Eyes: Negative. Respiratory: Negative. Cardiovascular: Negative. Gastrointestinal: Negative for abdominal pain and nausea. Skin: Negative. Neurological: Negative. Physical Exam:      Constitutional: pt is oriented to person, place, and time. HENT:   Head: Normocephalic and atraumatic. Eyes: Pupils are equal, round, and reactive to light. EOM are normal.   Cardiovascular: Normal rate, regular rhythm and normal heart sounds. Pulmonary/Chest: Breath sounds normal. No wheezes. No rales. Exhibits no tenderness. Abdominal: Soft. Bowel sounds are normal. There is no abdominal tenderness. There is no rebound and no guarding. Musculoskeletal: Normal range of motion. Neurological: pt is alert and oriented to person, place, and time. XR CHEST PORT   Final Result   Emphysematous but clear lungs. CTA CHEST W OR W WO CONT   Final Result   1. No evidence of pulmonary embolus. 2.  Small right pleural effusion with mild right basilar airspace disease. 3. Centrilobular emphysema. 4. Cardiomegaly.             XR CHEST PORT   Final Result      No acute process on portable chest.              Recent Results (from the past 24 hour(s))   BLOOD GAS, ARTERIAL    Collection Time: 11/28/22 10:03 AM   Result Value Ref Range    pH 7.31 (L) 7.35 - 7.45      PCO2 59 (H) 35 - 45 mmHg    PO2 99 80 - 100 mmHg    O2 SATURATION 97 95 - 99 %    BICARBONATE 29 (H) 22 - 26 mmol/L    BASE EXCESS 2.1 0 - 3 mmol/L    O2 METHOD Nasal Cannula      O2 FLOW RATE 3.00 L/min    FIO2 32 %    Sample source Arterial      SITE Right Radial      GUMARO'S TEST YES      Carboxy-Hgb 0.3 (L) 1 - 2 %    Methemoglobin 0.3 0 - 1.4 %    Oxyhemoglobin 96.3 95 - 99 %    Performed by Chinomnso Makinde     TEMPERATURE 98.6           Results       Procedure Component Value Units Date/Time    CULTURE, URINE [[id number]] Collected: 11/23/22 1555    Order Status: Completed Specimen: Urine Updated: 11/25/22 1236     Special Requests: No Special Requests        Culture result: No Growth (<1000 cfu/mL)       CULTURE, BLOOD, PAIRED [[id number]]     Order Status: Canceled Specimen: Blood     COVID-19 RAPID TEST [[id number]]     Order Status: Canceled     INFLUENZA A & B AG (RAPID TEST) [[id number]]     Order Status: Canceled Specimen: Nasopharyngeal              Labs:     No results for input(s): WBC, HGB, HCT, PLT, HGBEXT, HCTEXT, PLTEXT, HGBEXT, HCTEXT, PLTEXT in the last 72 hours. Recent Labs     11/26/22  0717      K 4.8      CO2 31   BUN 19   CREA 0.68   *   CA 8.7   MG 2.7*       No results for input(s): ALT, AP, TBIL, TBILI, TP, ALB, GLOB, GGT, AML, LPSE in the last 72 hours. No lab exists for component: SGOT, GPT, AMYP, HLPSE    No results for input(s): INR, PTP, APTT, INREXT, INREXT in the last 72 hours. No results for input(s): FE, TIBC, PSAT, FERR in the last 72 hours. Lab Results   Component Value Date/Time    Folate 12.3 07/12/2018 08:10 AM        Recent Labs     11/28/22  1003   PH 7.31*   PCO2 59*   PO2 99     No results for input(s): CPK, CKNDX, TROIQ in the last 72 hours.     No lab exists for component: CPKMB  No results found for: CHOL, CHOLX, CHLST, CHOLV, HDL, HDLP, LDL, LDLC, DLDLP, TGLX, TRIGL, TRIGP, CHHD, CHHDX  No results found for: 4295  [facility]  Lab Results Component Value Date/Time    Color Yellow/Straw 11/22/2022 04:12 PM    Appearance Clear 11/22/2022 04:12 PM    Specific gravity 1.005 11/22/2022 04:12 PM    pH (UA) 7.0 11/22/2022 04:12 PM    Protein Negative 11/22/2022 04:12 PM    Glucose Negative 11/22/2022 04:12 PM    Ketone Negative 11/22/2022 04:12 PM    Bilirubin Negative 11/22/2022 04:12 PM    Urobilinogen 0.1 11/22/2022 04:12 PM    Nitrites Negative 11/22/2022 04:12 PM    Leukocyte Esterase Moderate (A) 11/22/2022 04:12 PM    Bacteria Negative 11/22/2022 04:12 PM    Bacteria Negative 11/22/2022 04:12 PM    WBC 20-50 11/22/2022 04:12 PM    WBC 20-50 11/22/2022 04:12 PM    RBC 0-5 11/22/2022 04:12 PM    RBC 0-5 11/22/2022 04:12 PM         Assessment:     A. fib with rapid ventricular rate  COPD  Chronic hypoxic respiratory failure on 3 L  Hypertension  Hypothyroidism  Depression  Neuropathy  UTI  Leukocytosis with steroids      Plan:     Cardizem 240 mg twice a day  COPD follow-up with pulmonologist  Continue other home medications  Continue Lasix 40 mg  Continue Solumedrol, nebulizers as needed  Continue doxycycline     Continue to monitor heart rate    Patient seen by the pulmonologist and plan for put on BiPAP but patient refused    Will do PT OT consult if stable possible discharge home tomorrow        Current Facility-Administered Medications:     tiotropium bromide (SPIRIVA RESPIMAT) 2.5 mcg Jeffrey Christiano, 2 [place], Inhalation, DAILY, Alton Riojas MD, 2 Puff at 11/28/22 0803    albuterol-ipratropium (DUO-NEB) 2.5 MG-0.5 MG/3 ML, 3 mL, Nebulization, Q6H PRN, Asim Flores MD, 3 mL at 11/27/22 1924    dilTIAZem ER (CARDIZEM CD) capsule 240 mg, 240 mg, Oral, BID, Marco Rader MD, 240 mg at 11/28/22 0844    sodium chloride (OCEAN) 0.65 % nasal squeeze bottle 2 Spray, 2 Spray, Both Nostrils, Q2H PRN, Alton Riojas MD    polyethylene glycol (MIRALAX) packet 17 g, 17 g, Oral, DAILY, Asim Flores MD, 17 g at 11/25/22 0902    doxycycline (VIBRAMYCIN) 100 mg in 0.9% sodium chloride (MBP/ADV) 100 mL MBP, 100 mg, IntraVENous, Q12H, Tyrone Olivo MD, Last Rate: 100 mL/hr at 11/28/22 1130, 100 mg at 11/28/22 1130    metoprolol tartrate (LOPRESSOR) tablet 25 mg, 25 mg, Oral, BID, Keiko Sims MD, 25 mg at 11/28/22 0844    apixaban (ELIQUIS) tablet 5 mg, 5 mg, Oral, BID, Keiko Sims MD, 5 mg at 11/28/22 0844    budesonide-formoteroL (SYMBICORT) 160-4.5 mcg/actuation HFA inhaler 2 Puff, 2 Puff, Inhalation, BID, sAim Flores MD, 2 Puff at 11/28/22 0802    hydrOXYzine HCL (ATARAX) tablet 25 mg, 25 mg, Oral, TID PRN, Natalya Flores MD    montelukast (SINGULAIR) tablet 10 mg, 10 mg, Oral, DAILY, Asim Flores MD, 10 mg at 11/28/22 0844    [Held by provider] nortriptyline (PAMELOR) capsule 10 mg, 10 mg, Oral, QHS, Natalya Flores MD    pantoprazole (PROTONIX) tablet 40 mg, 40 mg, Oral, DAILY, Asim Flores MD, 40 mg at 11/28/22 0844    pravastatin (PRAVACHOL) tablet 40 mg, 40 mg, Oral, DAILY, Asim Flores MD, 40 mg at 11/28/22 0844    glucose chewable tablet 16 g, 4 Tablet, Oral, PRN, Natalya Flores MD    glucagon (GLUCAGEN) injection 1 mg, 1 mg, IntraMUSCular, PRN, Natalya Flores MD    acetaminophen (TYLENOL) tablet 650 mg, 650 mg, Oral, Q6H PRN, 650 mg at 11/28/22 0105 **OR** acetaminophen (TYLENOL) suppository 650 mg, 650 mg, Rectal, Q6H PRN, Asim Flores MD    polyethylene glycol (MIRALAX) packet 17 g, 17 g, Oral, DAILY PRN, Asim Flores MD, 17 g at 11/24/22 1636    ondansetron (ZOFRAN ODT) tablet 4 mg, 4 mg, Oral, Q8H PRN **OR** ondansetron (ZOFRAN) injection 4 mg, 4 mg, IntraVENous, Q6H PRN, Asim Flores MD    levothyroxine (SYNTHROID) tablet 75 mcg, 75 mcg, Oral, ACB, Asim Flores MD, 75 mcg at 11/28/22 0844    DULoxetine (CYMBALTA) capsule 60 mg, 60 mg, Oral, DAILY, Asim Flores MD, 60 mg at 11/28/22 0844    gabapentin (NEURONTIN) capsule 400 mg, 400 mg, Oral, BID, Natalya Flores, MD, 400 mg at 11/28/22 0844    hydrOXYchloroQUINE (PLAQUENIL) tablet 200 mg, 200 mg, Oral, DAILY WITH BREAKFAST, Asim Flores MD, 200 mg at 11/28/22 0844    methylPREDNISolone (PF) (SOLU-MEDROL) injection 40 mg, 40 mg, IntraVENous, Q6H, Asim Flores MD, 40 mg at 11/28/22 1130

## 2022-11-28 NOTE — PROGRESS NOTES
Progress Note      11/28/2022 10:20 AM  NAME: Eilsa Ortiz   MRN:  [id number]   Admit Diagnosis: Atrial fibrillation with RVR ([facility] [address] [I48.91]  Atrial fibrillation ([facility] [address] [I48.91]          Assessment/Plan:   Atrial fibrillation with rapid ventricular response. Patient's rate control of atrial fibrillation is better at rest but increases with activities due to underlying advanced COPD. Patient heart rate with out of bed activities goes [id number] making her lightheaded. The patient has maxed out on long-acting diltiazem. Cannot increase metoprolol dose as is likely to make her COPD worse. metoprolol 25 mg twice daily along with anticoagulation by Eliquis. Patient is reluctant to use amiodarone due to her advanced COPD. She will be high risk AV node ablation with permanent pacemaker as she is not likely to tolerate sedation due to her advanced COPD. The options  of optimizing rate control are limited. This was explained to the patient at length. Patient now wants to go home. NSVT. Patient's episodic NSVT appears to be due to her hypoxic respiratory failure. Patient's recent echocardiogram has shown preserved LV systolic function. I will continue current regimen of beta-blocker with observation. History of GERD with GI bleed 5 years ago. Continue Protonix. COPD, on home oxygen. Pulmonary hypertension related to advanced COPD. PA systolic pressure of 47 mmHg. Normal LV function by echo.         []       High complexity decision making was performed in this patient at high risk for decompensation with multiple organ involvement. Subjective:     Elisa Ortiz denies chest pain, get short of breath on moving around discussed with RN events overnight.      Review of Systems:    Symptom Y/N Comments  Symptom Y/N Comments   Fever/Chills N   Chest Pain N    Poor Appetite N   Edema N    Cough N   Abdominal Pain N    Sputum N   Joint Pain N    SOB/PEDRAZA Y   Pruritis/Rash N    Nausea/vomit N   Tolerating PT/OT Y    Diarrhea N   Tolerating Diet Y    Constipation N   Other       Could NOT obtain due to:      Objective:      Physical Exam:    Last 24hrs VS reviewed since prior progress note. Most recent are:    Visit Vitals  /76 (BP 1 Location: Right upper arm, BP Patient Position: At rest)   Pulse (!) 114   Temp 97.7 °F (36.5 °C)   Resp 18   Ht 5' 4\" (1.626 m)   Wt 60.3 kg (133 lb)   SpO2 94%   Breastfeeding No   BMI 22.83 kg/m²     No intake or output data in the 24 hours ending 11/28/22 1608       General Appearance: Well developed, well nourished, alert; no acute distress. Ears/Nose/Mouth/Throat: Hearing grossly normal; moist mucous membranes  Neck: Supple. Chest: Lungs scattered rhonchi to auscultation bilaterally. Cardiovascular: Irregular rate and rhythm, S1S2 normal, no murmur. Abdomen: Soft, non-tender, bowel sounds are active. Extremities: No edema bilaterally. Skin: Warm and dry. []         Post-cath site without hematoma, bruit, tenderness, or thrill. Distal pulses intact. PMH/SH reviewed - no change compared to H&P    Data Review    Telemetry:     EKG:   []  No new EKG for review    Lab Data Personally Reviewed:    No results for input(s): WBC, HGB, HCT, PLT, HGBEXT, HCTEXT, PLTEXT, HGBEXT, HCTEXT, PLTEXT in the last 72 hours. No results for input(s): INR, PTP, APTT, INREXT, INREXT in the last 72 hours. Recent Labs     11/26/22  0717      K 4.8      CO2 31   BUN 19   CREA 0.68   *   CA 8.7   MG 2.7*       No results for input(s): CPK, CKNDX, TROIQ in the last 72 hours. No lab exists for component: CPKMB  No results found for: CHOL, CHOLX, CHLST, CHOLV, HDL, HDLP, LDL, LDLC, DLDLP, TGLX, TRIGL, TRIGP, CHHD, CHHDX    No results for input(s): AP, TBIL, TP, ALB, GLOB, GGT, AML, LPSE in the last 72 hours.     No lab exists for component: SGOT, GPT, AMYP, HLPSE    Recent Labs     11/28/22  1003   PH 7.31*   PCO2 59*   PO2 99       Medications Personally Reviewed:    Current Facility-Administered Medications   Medication Dose Route Frequency    tiotropium bromide (SPIRIVA RESPIMAT) 2.5 mcg /actuation  2 Puff Inhalation DAILY    albuterol-ipratropium (DUO-NEB) 2.5 MG-0.5 MG/3 ML  3 mL Nebulization Q6H PRN    dilTIAZem ER (CARDIZEM CD) capsule 240 mg  240 mg Oral BID    sodium chloride (OCEAN) 0.65 % nasal squeeze bottle 2 Spray  2 Spray Both Nostrils Q2H PRN    polyethylene glycol (MIRALAX) packet 17 g  17 g Oral DAILY    doxycycline (VIBRAMYCIN) 100 mg in 0.9% sodium chloride (MBP/ADV) 100 mL MBP  100 mg IntraVENous Q12H    metoprolol tartrate (LOPRESSOR) tablet 25 mg  25 mg Oral BID    apixaban (ELIQUIS) tablet 5 mg  5 mg Oral BID    budesonide-formoteroL (SYMBICORT) 160-4.5 mcg/actuation HFA inhaler 2 Puff  2 Puff Inhalation BID    hydrOXYzine HCL (ATARAX) tablet 25 mg  25 mg Oral TID PRN    montelukast (SINGULAIR) tablet 10 mg  10 mg Oral DAILY    [Held by provider] nortriptyline (PAMELOR) capsule 10 mg  10 mg Oral QHS    pantoprazole (PROTONIX) tablet 40 mg  40 mg Oral DAILY    pravastatin (PRAVACHOL) tablet 40 mg  40 mg Oral DAILY    glucose chewable tablet 16 g  4 Tablet Oral PRN    glucagon (GLUCAGEN) injection 1 mg  1 mg IntraMUSCular PRN    acetaminophen (TYLENOL) tablet 650 mg  650 mg Oral Q6H PRN    Or    acetaminophen (TYLENOL) suppository 650 mg  650 mg Rectal Q6H PRN    polyethylene glycol (MIRALAX) packet 17 g  17 g Oral DAILY PRN    ondansetron (ZOFRAN ODT) tablet 4 mg  4 mg Oral Q8H PRN    Or    ondansetron (ZOFRAN) injection 4 mg  4 mg IntraVENous Q6H PRN    levothyroxine (SYNTHROID) tablet 75 mcg  75 mcg Oral ACB    DULoxetine (CYMBALTA) capsule 60 mg  60 mg Oral DAILY    gabapentin (NEURONTIN) capsule 400 mg  400 mg Oral BID    hydrOXYchloroQUINE (PLAQUENIL) tablet 200 mg  200 mg Oral DAILY WITH BREAKFAST    methylPREDNISolone (PF) (SOLU-MEDROL) injection 40 mg  40 mg IntraVENous Q6H         Clinical care time spent 30 minutes.       Marco Anna Marie Hodgson MD

## 2022-11-28 NOTE — PROGRESS NOTES
Pulmonary and critical care progress note       Reason for consultation:  Acute respiratory failure with hypoxia and atrial fibrillation with rapid ventricular response    Patient seen and examined  Overnight events noted  Lying in bed  Was somewhat lethargic and sleepy on 3 L when I saw her  ABGs done which showed pH of 7.31, PCO2 59,     Prior to Admission medications    Medication Sig Start Date End Date Taking? Authorizing Provider   gabapentin (NEURONTIN) 600 mg tablet Take 600 mg by mouth three (3) times daily. Yes Provider, Historical   hydrOXYzine HCL (ATARAX) 25 mg tablet Take 25 mg by mouth three (3) times daily as needed. Provider, Historical   pantoprazole (PROTONIX) 40 mg tablet Take 40 mg by mouth daily. Provider, Historical   pravastatin (PRAVACHOL) 40 mg tablet Take 40 mg by mouth daily. 7/2/18   Provider, Historical   nortriptyline (PAMELOR) 10 mg capsule Take 10 mg by mouth nightly. Provider, Historical   montelukast (SINGULAIR) 10 mg tablet Take 10 mg by mouth daily. Provider, Historical   acetaminophen (TYLENOL EXTRA STRENGTH) 500 mg tablet Take 500 mg by mouth every six (6) hours as needed. Provider, Historical   levothyroxine (SYNTHROID) 50 mcg tablet Take 50 mcg by mouth Daily (before breakfast). Provider, Historical   dilTIAZem ER (CARDIZEM CD) 120 mg capsule Take 120 mg by mouth daily. Provider, Historical   citalopram (CELEXA) 20 mg tablet Take 20 mg by mouth daily. Provider, Historical   fluticasone-salmeterol (ADVAIR DISKUS) 250-50 mcg/Dose diskus inhaler Take 1 Puff by inhalation every twelve (12) hours. Provider, Historical   IPRATROPIUM/ALBUTEROL SULFATE (COMBIVENT IN) Take 14.7 g by inhalation four (4) times daily. 10/22/10   Provider, Historical   calcium-vitamin D (OYSTER SHELL) 500 mg(1,250mg) -200 unit per tablet Take 1 Tab by mouth two (2) times daily (with meals).     Provider, Historical         Current Facility-Administered Medications: tiotropium bromide (SPIRIVA RESPIMAT) 2.5 mcg Susanna Divine, 2 [place], Inhalation, DAILY, Yoselin Murrieta MD, 2 Puff at 11/28/22 5789    albuterol-ipratropium (DUO-NEB) 2.5 MG-0.5 MG/3 ML, 3 mL, Nebulization, Q6H PRN, Asim Flores MD, 3 mL at 11/28/22 1319    dilTIAZem ER (CARDIZEM CD) capsule 240 mg, 240 mg, Oral, BID, Bia Contreras MD, 240 mg at 11/28/22 0844    sodium chloride (OCEAN) 0.65 % nasal squeeze bottle 2 Spray, 2 Spray, Both Nostrils, Q2H PRN, Yoselin Murrieta MD    polyethylene glycol (MIRALAX) packet 17 g, 17 g, Oral, DAILY, Asim Flores MD, 17 g at 11/25/22 0902    doxycycline (VIBRAMYCIN) 100 mg in 0.9% sodium chloride (MBP/ADV) 100 mL MBP, 100 mg, IntraVENous, Q12H, Tyrone Olivo MD, Last Rate: 100 mL/hr at 11/28/22 1130, 100 mg at 11/28/22 1130    metoprolol tartrate (LOPRESSOR) tablet 25 mg, 25 mg, Oral, BID, Jesse Roberts MD, 25 mg at 11/28/22 1559    apixaban (ELIQUIS) tablet 5 mg, 5 mg, Oral, BID, Jesse Roberts MD, 5 mg at 11/28/22 0844    budesonide-formoteroL (SYMBICORT) 160-4.5 mcg/actuation HFA inhaler 2 Puff, 2 Puff, Inhalation, BID, Asim Flores MD, 2 Puff at 11/28/22 0802    hydrOXYzine HCL (ATARAX) tablet 25 mg, 25 mg, Oral, TID PRN, Asim Flores MD    montelukast (SINGULAIR) tablet 10 mg, 10 mg, Oral, DAILY, Asim Flores MD, 10 mg at 11/28/22 0844    [Held by provider] nortriptyline (PAMELOR) capsule 10 mg, 10 mg, Oral, QHS, Asim Flores MD    pantoprazole (PROTONIX) tablet 40 mg, 40 mg, Oral, DAILY, Asim Flores MD, 40 mg at 11/28/22 0844    pravastatin (PRAVACHOL) tablet 40 mg, 40 mg, Oral, DAILY, Asim Flores MD, 40 mg at 11/28/22 0844    glucose chewable tablet 16 g, 4 Tablet, Oral, PRN, Asim Flores MD    glucagon (GLUCAGEN) injection 1 mg, 1 mg, IntraMUSCular, PRN, Asim Flores MD    acetaminophen (TYLENOL) tablet 650 mg, 650 mg, Oral, Q6H PRN, 650 mg at 11/28/22 0105 **OR** acetaminophen (TYLENOL) suppository 650 mg, 650 mg, Rectal, Q6H PRN, Danilo Flores MD    polyethylene glycol (MIRALAX) packet 17 g, 17 g, Oral, DAILY PRN, Asim Flores MD, 17 g at 11/24/22 1636    ondansetron (ZOFRAN ODT) tablet 4 mg, 4 mg, Oral, Q8H PRN **OR** ondansetron (ZOFRAN) injection 4 mg, 4 mg, IntraVENous, Q6H PRN, Asim Flores MD    levothyroxine (SYNTHROID) tablet 75 mcg, 75 mcg, Oral, ACB, Asim Flores MD, 75 mcg at 11/28/22 0844    DULoxetine (CYMBALTA) capsule 60 mg, 60 mg, Oral, DAILY, Asim Flores MD, 60 mg at 11/28/22 0844    gabapentin (NEURONTIN) capsule 400 mg, 400 mg, Oral, BID, Danilo Flores MD, 400 mg at 11/28/22 9151    hydrOXYchloroQUINE (PLAQUENIL) tablet 200 mg, 200 mg, Oral, DAILY WITH BREAKFAST, Asim Flores MD, 200 mg at 11/28/22 0844    methylPREDNISolone (PF) (SOLU-MEDROL) injection 40 mg, 40 mg, IntraVENous, Q6H, Asim Flores MD, 40 mg at 11/28/22 1130    LAB DATA REVIEWED:    Recent Results (from the past 24 hour(s))   BLOOD GAS, ARTERIAL    Collection Time: 11/28/22 10:03 AM   Result Value Ref Range    pH 7.31 (L) 7.35 - 7.45      PCO2 59 (H) 35 - 45 mmHg    PO2 99 80 - 100 mmHg    O2 SATURATION 97 95 - 99 %    BICARBONATE 29 (H) 22 - 26 mmol/L    BASE EXCESS 2.1 0 - 3 mmol/L    O2 METHOD Nasal Cannula      O2 FLOW RATE 3.00 L/min    FIO2 32 %    Sample source Arterial      SITE Right Radial      GUMARO'S TEST YES      Carboxy-Hgb 0.3 (L) 1 - 2 %    Methemoglobin 0.3 0 - 1.4 %    Oxyhemoglobin 96.3 95 - 99 %    Performed by Chinomnso Patrice     TEMPERATURE 98.6           XR Results (most recent):  Results from Hospital Encounter encounter on 11/22/22    XR CHEST PORT    Narrative  PORTABLE CHEST RADIOGRAPH/S: 11/24/2022 3:53 AM    INDICATION: Respiratory failure. COMPARISON: 11/22/2022, CT chest 11/23/2022. TECHNIQUE: Portable frontal semiupright radiograph/s of the chest.    FINDINGS:  The lungs are emphysematous, but clear. The central airways are patent.  No  pneumothorax or pleural effusion. Impression  Emphysematous but clear lungs. XR CHEST PORT   Final Result   Emphysematous but clear lungs. CTA CHEST W OR W WO CONT   Final Result   1. No evidence of pulmonary embolus. 2.  Small right pleural effusion with mild right basilar airspace disease. 3. Centrilobular emphysema. 4. Cardiomegaly. XR CHEST PORT   Final Result      No acute process on portable chest.              Review of Systems:  Review of 12 systems was performed and is noncontributory other than as outlined above    Objective:   VITALS:    Visit Vitals  /76 (BP 1 Location: Right upper arm, BP Patient Position: At rest)   Pulse (!) 114   Temp 97.7 °F (36.5 °C)   Resp 18   Ht 5' 4\" (1.626 m)   Wt 60.3 kg (133 lb)   SpO2 94%   Breastfeeding No   BMI 22.83 kg/m²       Physical Exam:   Constitutional: pt is oriented to person, place, and time. HENT:   Head: Normocephalic and atraumatic. Eyes: Pupils are equal, round, and reactive to light. EOM are normal.   Cardiovascular: Irregularly irregular, tachycardic  Pulmonary/Chest: Reduced air entry bilaterally with prolonged exhalation, occasional wheezing, basal crackles  Exhibits no tenderness. Abdominal: Soft. Bowel sounds are normal. There is no abdominal tenderness. There is no rebound and no guarding. Musculoskeletal: Normal range of motion. Neurological: pt is alert and oriented to person, place, and time. Alert. Normal strength. No cranial nerve deficit or sensory deficit. Displays a negative Romberg sign. ASSESSMENT:    1. Acute on chronic respiratory failure with hypoxia and hypercapnia  2. Acute exacerbation of COPD  3. Paroxysmal atrial fibrillation with rapid ventricular response  4. Left-sided pleuritic chest pain  5. Hypertension  6.   Depression    Plan:    Continue nasal cannula oxygen as needed to keep saturation above 92%    Start BiPAP 14/8, rate of 16, effort to 30%  Wwill use continuously for now with brakes for meals  Further changes in BiPAP setting based on clinical response and ABG results    Patient in rapid A. fib  Has been complaining of headache when she stands up. She may need AV node ablation with permanent pacemaker if rate control remains an issue . Left-sided pleuritic chest pain    CT angiogram of chest did not show any pulmonary embolism. Showed cardiomegaly and changes of emphysema. Acute exacerbation of COPD  Continue Solu-Medrol  Continue Rocephin  Added doxycycline for atypical coverage  D/C Nebulizers as it is causing jitteryness. Start Spiriva. Cardizem and atenolol. placed on Lasix 40 mg IV  Check electrolytes and replace as needed  Intake and output charting  Monitor renal function with fluid changes    Continue blood pressure medications  Cymbalta 60 mg daily  Neurontin 400 mg twice a day    DVT and GI prophylaxis    Questions of patient were answered at bedside in detail  Case discussed in detail with RN, RT, and care team  Thank you for involving me in the care of this patient  I will follow with you closely during hospitalization    Time spent more than 50 minutes under patient care with no overlap reviewing results and records, decision making, and answering questions.     Yaneli Luna MD  [facility]

## 2022-11-28 NOTE — PROGRESS NOTES
Chart reviewed, DCP remains for patient to d/c to home self once medically stable, however CM continues to monitor chart for PT/OT reccs to determine further needs on d/c

## 2022-11-29 LAB
ALBUMIN SERPL-MCNC: 3.9 G/DL (ref 3.5–5)
ALBUMIN/GLOB SERPL: 1.4 {RATIO} (ref 1.1–2.2)
ALP SERPL-CCNC: 59 U/L (ref 45–117)
ALT SERPL-CCNC: 112 U/L (ref 12–78)
ANION GAP SERPL CALC-SCNC: 7 MMOL/L (ref 5–15)
AST SERPL W P-5'-P-CCNC: 50 U/L (ref 15–37)
BILIRUB SERPL-MCNC: 0.6 MG/DL (ref 0.2–1)
BUN SERPL-MCNC: 29 MG/DL (ref 6–20)
BUN/CREAT SERPL: 42 (ref 12–20)
CA-I BLD-MCNC: 8.8 MG/DL (ref 8.5–10.1)
CHLORIDE SERPL-SCNC: 94 MMOL/L (ref 97–108)
CO2 SERPL-SCNC: 34 MMOL/L (ref 21–32)
CREAT SERPL-MCNC: 0.69 MG/DL (ref 0.55–1.02)
ERYTHROCYTE [DISTWIDTH] IN BLOOD BY AUTOMATED COUNT: 14 % (ref 11.5–14.5)
GLOBULIN SER CALC-MCNC: 2.7 G/DL (ref 2–4)
GLUCOSE SERPL-MCNC: 156 MG/DL (ref 65–100)
HCT VFR BLD AUTO: 30.3 % (ref 35–47)
HGB BLD-MCNC: 9.6 G/DL (ref 11.5–16)
MCH RBC QN AUTO: 30 PG (ref 26–34)
MCHC RBC AUTO-ENTMCNC: 31.7 G/DL (ref 30–36.5)
MCV RBC AUTO: 94.7 FL (ref 80–99)
NRBC # BLD: 0.11 K/UL (ref 0–0.01)
NRBC BLD-RTO: 0.8 PER 100 WBC
PLATELET # BLD AUTO: 330 K/UL (ref 150–400)
PMV BLD AUTO: 10.6 FL (ref 8.9–12.9)
POTASSIUM SERPL-SCNC: 5.1 MMOL/L (ref 3.5–5.1)
PROT SERPL-MCNC: 6.6 G/DL (ref 6.4–8.2)
RBC # BLD AUTO: 3.2 M/UL (ref 3.8–5.2)
SODIUM SERPL-SCNC: 135 MMOL/L (ref 136–145)
WBC # BLD AUTO: 13.9 K/UL (ref 3.6–11)

## 2022-11-29 PROCEDURE — 94761 N-INVAS EAR/PLS OXIMETRY MLT: CPT

## 2022-11-29 PROCEDURE — 74011000258 HC RX REV CODE- 258: Performed by: INTERNAL MEDICINE

## 2022-11-29 PROCEDURE — 74011250637 HC RX REV CODE- 250/637: Performed by: FAMILY MEDICINE

## 2022-11-29 PROCEDURE — 36415 COLL VENOUS BLD VENIPUNCTURE: CPT

## 2022-11-29 PROCEDURE — 74011250637 HC RX REV CODE- 250/637: Performed by: INTERNAL MEDICINE

## 2022-11-29 PROCEDURE — 85027 COMPLETE CBC AUTOMATED: CPT

## 2022-11-29 PROCEDURE — 74011250636 HC RX REV CODE- 250/636: Performed by: INTERNAL MEDICINE

## 2022-11-29 PROCEDURE — 65270000029 HC RM PRIVATE

## 2022-11-29 PROCEDURE — 77010033678 HC OXYGEN DAILY

## 2022-11-29 PROCEDURE — 80053 COMPREHEN METABOLIC PANEL: CPT

## 2022-11-29 PROCEDURE — 94640 AIRWAY INHALATION TREATMENT: CPT

## 2022-11-29 PROCEDURE — 74011250636 HC RX REV CODE- 250/636: Performed by: FAMILY MEDICINE

## 2022-11-29 RX ORDER — METOPROLOL TARTRATE 50 MG/1
50 TABLET ORAL 2 TIMES DAILY
Status: DISCONTINUED | OUTPATIENT
Start: 2022-11-29 | End: 2022-11-30 | Stop reason: HOSPADM

## 2022-11-29 RX ORDER — VERAPAMIL HYDROCHLORIDE 100 MG/1
300 CAPSULE, EXTENDED RELEASE ORAL
Status: DISCONTINUED | OUTPATIENT
Start: 2022-11-29 | End: 2022-11-29

## 2022-11-29 RX ORDER — VERAPAMIL HYDROCHLORIDE 80 MG/1
120 TABLET ORAL 3 TIMES DAILY
Status: DISCONTINUED | OUTPATIENT
Start: 2022-11-29 | End: 2022-11-30 | Stop reason: HOSPADM

## 2022-11-29 RX ADMIN — BUDESONIDE AND FORMOTEROL FUMARATE DIHYDRATE 2 PUFF: 160; 4.5 AEROSOL RESPIRATORY (INHALATION) at 07:36

## 2022-11-29 RX ADMIN — DOXYCYCLINE 100 MG: 100 INJECTION, POWDER, LYOPHILIZED, FOR SOLUTION INTRAVENOUS at 23:28

## 2022-11-29 RX ADMIN — METHYLPREDNISOLONE SODIUM SUCCINATE 40 MG: 40 INJECTION, POWDER, FOR SOLUTION INTRAMUSCULAR; INTRAVENOUS at 17:37

## 2022-11-29 RX ADMIN — HYDROXYZINE HYDROCHLORIDE 25 MG: 10 TABLET ORAL at 12:12

## 2022-11-29 RX ADMIN — SALINE NASAL SPRAY 2 SPRAY: 1.5 SOLUTION NASAL at 11:21

## 2022-11-29 RX ADMIN — VERAPAMIL HYDROCHLORIDE 120 MG: 80 TABLET ORAL at 21:22

## 2022-11-29 RX ADMIN — DILTIAZEM HYDROCHLORIDE 240 MG: 120 CAPSULE, COATED, EXTENDED RELEASE ORAL at 09:19

## 2022-11-29 RX ADMIN — METOPROLOL TARTRATE 50 MG: 50 TABLET, FILM COATED ORAL at 16:16

## 2022-11-29 RX ADMIN — METHYLPREDNISOLONE SODIUM SUCCINATE 40 MG: 40 INJECTION, POWDER, FOR SOLUTION INTRAMUSCULAR; INTRAVENOUS at 21:23

## 2022-11-29 RX ADMIN — APIXABAN 5 MG: 5 TABLET, FILM COATED ORAL at 09:19

## 2022-11-29 RX ADMIN — MONTELUKAST 10 MG: 10 TABLET, FILM COATED ORAL at 09:19

## 2022-11-29 RX ADMIN — VERAPAMIL HYDROCHLORIDE 120 MG: 80 TABLET ORAL at 12:13

## 2022-11-29 RX ADMIN — HYDROXYZINE HYDROCHLORIDE 25 MG: 10 TABLET ORAL at 21:22

## 2022-11-29 RX ADMIN — PRAVASTATIN SODIUM 40 MG: 40 TABLET ORAL at 09:19

## 2022-11-29 RX ADMIN — METHYLPREDNISOLONE SODIUM SUCCINATE 40 MG: 40 INJECTION, POWDER, FOR SOLUTION INTRAMUSCULAR; INTRAVENOUS at 12:12

## 2022-11-29 RX ADMIN — DILTIAZEM HYDROCHLORIDE 240 MG: 120 CAPSULE, COATED, EXTENDED RELEASE ORAL at 21:23

## 2022-11-29 RX ADMIN — PANTOPRAZOLE SODIUM 40 MG: 40 TABLET, DELAYED RELEASE ORAL at 09:19

## 2022-11-29 RX ADMIN — BUDESONIDE AND FORMOTEROL FUMARATE DIHYDRATE 2 PUFF: 160; 4.5 AEROSOL RESPIRATORY (INHALATION) at 20:23

## 2022-11-29 RX ADMIN — TIOTROPIUM BROMIDE INHALATION SPRAY 2 PUFF: 3.12 SPRAY, METERED RESPIRATORY (INHALATION) at 07:38

## 2022-11-29 RX ADMIN — ACETAMINOPHEN 650 MG: 325 TABLET ORAL at 02:24

## 2022-11-29 RX ADMIN — DULOXETINE HYDROCHLORIDE 60 MG: 30 CAPSULE, DELAYED RELEASE ORAL at 09:19

## 2022-11-29 RX ADMIN — HYDROXYCHLOROQUINE SULFATE 200 MG: 200 TABLET, FILM COATED ORAL at 09:19

## 2022-11-29 RX ADMIN — METOPROLOL TARTRATE 25 MG: 25 TABLET, FILM COATED ORAL at 09:19

## 2022-11-29 RX ADMIN — GABAPENTIN 400 MG: 400 CAPSULE ORAL at 21:23

## 2022-11-29 RX ADMIN — GABAPENTIN 400 MG: 400 CAPSULE ORAL at 09:19

## 2022-11-29 RX ADMIN — ONDANSETRON 4 MG: 4 TABLET, ORALLY DISINTEGRATING ORAL at 18:44

## 2022-11-29 RX ADMIN — APIXABAN 5 MG: 5 TABLET, FILM COATED ORAL at 21:23

## 2022-11-29 RX ADMIN — VERAPAMIL HYDROCHLORIDE 120 MG: 80 TABLET ORAL at 16:16

## 2022-11-29 RX ADMIN — DOXYCYCLINE 100 MG: 100 INJECTION, POWDER, LYOPHILIZED, FOR SOLUTION INTRAVENOUS at 12:13

## 2022-11-29 NOTE — PROGRESS NOTES
General Daily Progress Note          Patient Name:   Clemencia Cooney       YOB: 1950       Age:  67 y.o. Admit Date: 11/22/2022      Subjective:     Patient is a 70y.o. year old female with signal past medical history of chronic A. fib not on anticoagulation secondary to bleed COPD hypothyroidism hyperlipidemia chronic pain neuropathy came to emergency room  Complaining of shortness of breath for last 2 weeks and worsening patient normally at 3 L oxygen by nasal cannula for COPD at home Emergency room seen by the ER physician work-up and showed patient on A. fib with rapid ventricular rate patient received 10 mg of IV Cardizem twice but patient still in rapid ventricular rate patient on Cardizem drip cardiology was consulted by the ER physician cardiology recommended patient to be admitted for further evaluation and treatment patient denies any fever chills has not shortness of breath no nausea no vomiting  In the ER blood work done BNP of 1300      Patient still on Cardizem drip heart rate fluctuating       11/24  Patient is resting comfortably in bed and has no complaints. States breathing treatments help. D Dimer 0.61  Elevated WBC count 15.7K    11/28  A. Fib with RVR, followed by cardiology, on Diltiazem 240 mg twice daily. Today patient states she feels sleepy, but has no other complaints. 11/29  Patient wants to go home. She is requesting xanax for anxiety. ECHO results    Left Ventricle: Normal left ventricular systolic function with a visually estimated EF of 50 - 55%. Left ventricle is smaller than normal. Mild septal thickening. Normal wall motion. Mitral Valve: Mild regurgitation. Tricuspid Valve: The estimated RVSP is 47 mmHg.     Objective:     Visit Vitals  BP (!) 157/81 (BP 1 Location: Left upper arm, BP Patient Position: At rest;Lying)   Pulse 82   Temp 98.8 °F (37.1 °C)   Resp 22   Ht 5' 4.02\" (1.626 m)   Wt 61.2 kg (134 lb 14.7 oz)   SpO2 92% Breastfeeding No   BMI 23.15 kg/m²        Recent Results (from the past 24 hour(s))   CBC W/O DIFF    Collection Time: 11/29/22  9:38 AM   Result Value Ref Range    WBC 13.9 (H) 3.6 - 11.0 K/uL    RBC 3.20 (L) 3.80 - 5.20 M/uL    HGB 9.6 (L) 11.5 - 16.0 g/dL    HCT 30.3 (L) 35.0 - 47.0 %    MCV 94.7 80.0 - 99.0 FL    MCH 30.0 26.0 - 34.0 PG    MCHC 31.7 30.0 - 36.5 g/dL    RDW 14.0 11.5 - 14.5 %    PLATELET 690 904 - 415 K/uL    MPV 10.6 8.9 - 12.9 FL    NRBC 0.8 (H) 0.0  WBC    ABSOLUTE NRBC 0.11 (H) 0.00 - 4.39 K/uL   METABOLIC PANEL, COMPREHENSIVE    Collection Time: 11/29/22  9:38 AM   Result Value Ref Range    Sodium 135 (L) 136 - 145 mmol/L    Potassium 5.1 3.5 - 5.1 mmol/L    Chloride 94 (L) 97 - 108 mmol/L    CO2 34 (H) 21 - 32 mmol/L    Anion gap 7 5 - 15 mmol/L    Glucose 156 (H) 65 - 100 mg/dL    BUN 29 (H) 6 - 20 mg/dL    Creatinine 0.69 0.55 - 1.02 mg/dL    BUN/Creatinine ratio 42 (H) 12 - 20      eGFR >60 >60 ml/min/1.73m2    Calcium 8.8 8.5 - 10.1 mg/dL    Bilirubin, total 0.6 0.2 - 1.0 mg/dL    AST (SGOT) 50 (H) 15 - 37 U/L    ALT (SGPT) 112 (H) 12 - 78 U/L    Alk. phosphatase 59 45 - 117 U/L    Protein, total 6.6 6.4 - 8.2 g/dL    Albumin 3.9 3.5 - 5.0 g/dL    Globulin 2.7 2.0 - 4.0 g/dL    A-G Ratio 1.4 1.1 - 2.2         [unfilled]      Review of Systems    Constitutional: Negative for chills and fever. HENT: Negative. Eyes: Negative. Respiratory: Negative. Cardiovascular: Negative. Gastrointestinal: Negative for abdominal pain and nausea. Skin: Negative. Neurological: Negative. Physical Exam:      Constitutional: pt is oriented to person, place, and time. HENT:   Head: Normocephalic and atraumatic. Eyes: Pupils are equal, round, and reactive to light. EOM are normal.   Cardiovascular: Normal rate, regular rhythm and normal heart sounds. Pulmonary/Chest: Breath sounds normal. No wheezes. No rales. Exhibits no tenderness. Abdominal: Soft.  Bowel sounds are normal. There is no abdominal tenderness. There is no rebound and no guarding. Musculoskeletal: Normal range of motion. Neurological: pt is alert and oriented to person, place, and time. XR CHEST PORT   Final Result   Emphysematous but clear lungs. CTA CHEST W OR W WO CONT   Final Result   1. No evidence of pulmonary embolus. 2.  Small right pleural effusion with mild right basilar airspace disease. 3. Centrilobular emphysema. 4. Cardiomegaly. XR CHEST PORT   Final Result      No acute process on portable chest.              Recent Results (from the past 24 hour(s))   CBC W/O DIFF    Collection Time: 11/29/22  9:38 AM   Result Value Ref Range    WBC 13.9 (H) 3.6 - 11.0 K/uL    RBC 3.20 (L) 3.80 - 5.20 M/uL    HGB 9.6 (L) 11.5 - 16.0 g/dL    HCT 30.3 (L) 35.0 - 47.0 %    MCV 94.7 80.0 - 99.0 FL    MCH 30.0 26.0 - 34.0 PG    MCHC 31.7 30.0 - 36.5 g/dL    RDW 14.0 11.5 - 14.5 %    PLATELET 675 699 - 301 K/uL    MPV 10.6 8.9 - 12.9 FL    NRBC 0.8 (H) 0.0  WBC    ABSOLUTE NRBC 0.11 (H) 0.00 - 6.02 K/uL   METABOLIC PANEL, COMPREHENSIVE    Collection Time: 11/29/22  9:38 AM   Result Value Ref Range    Sodium 135 (L) 136 - 145 mmol/L    Potassium 5.1 3.5 - 5.1 mmol/L    Chloride 94 (L) 97 - 108 mmol/L    CO2 34 (H) 21 - 32 mmol/L    Anion gap 7 5 - 15 mmol/L    Glucose 156 (H) 65 - 100 mg/dL    BUN 29 (H) 6 - 20 mg/dL    Creatinine 0.69 0.55 - 1.02 mg/dL    BUN/Creatinine ratio 42 (H) 12 - 20      eGFR >60 >60 ml/min/1.73m2    Calcium 8.8 8.5 - 10.1 mg/dL    Bilirubin, total 0.6 0.2 - 1.0 mg/dL    AST (SGOT) 50 (H) 15 - 37 U/L    ALT (SGPT) 112 (H) 12 - 78 U/L    Alk.  phosphatase 59 45 - 117 U/L    Protein, total 6.6 6.4 - 8.2 g/dL    Albumin 3.9 3.5 - 5.0 g/dL    Globulin 2.7 2.0 - 4.0 g/dL    A-G Ratio 1.4 1.1 - 2.2           Results       Procedure Component Value Units Date/Time    CULTURE, URINE [[id number]] Collected: 11/23/22 4214    Order Status: Completed Specimen: Urine Updated: 11/25/22 1236     Special Requests: No Special Requests        Culture result: No Growth (<1000 cfu/mL)       CULTURE, BLOOD, PAIRED [[id number]]     Order Status: Canceled Specimen: Blood     COVID-19 RAPID TEST [[id number]]     Order Status: Canceled     INFLUENZA A & B AG (RAPID TEST) [[id number]]     Order Status: Canceled Specimen: Nasopharyngeal              Labs:     Recent Labs     11/29/22  0938   WBC 13.9*   HGB 9.6*   HCT 30.3*          Recent Labs     11/29/22  0938   *   K 5.1   CL 94*   CO2 34*   BUN 29*   CREA 0.69   *   CA 8.8       Recent Labs     11/29/22  0938   *   AP 59   TBILI 0.6   TP 6.6   ALB 3.9   GLOB 2.7       No results for input(s): INR, PTP, APTT, INREXT, INREXT in the last 72 hours. No results for input(s): FE, TIBC, PSAT, FERR in the last 72 hours. Lab Results   Component Value Date/Time    Folate 12.3 07/12/2018 08:10 AM        Recent Labs     11/28/22  1003   PH 7.31*   PCO2 59*   PO2 99       No results for input(s): CPK, CKNDX, TROIQ in the last 72 hours.     No lab exists for component: CPKMB  No results found for: CHOL, CHOLX, CHLST, CHOLV, HDL, HDLP, LDL, LDLC, DLDLP, TGLX, TRIGL, TRIGP, CHHD, CHHDX  No results found for: [facility]  Lab Results   Component Value Date/Time    Color Yellow/Straw 11/22/2022 04:12 PM    Appearance Clear 11/22/2022 04:12 PM    Specific gravity 1.005 11/22/2022 04:12 PM    pH (UA) 7.0 11/22/2022 04:12 PM    Protein Negative 11/22/2022 04:12 PM    Glucose Negative 11/22/2022 04:12 PM    Ketone Negative 11/22/2022 04:12 PM    Bilirubin Negative 11/22/2022 04:12 PM    Urobilinogen 0.1 11/22/2022 04:12 PM    Nitrites Negative 11/22/2022 04:12 PM    Leukocyte Esterase Moderate (A) 11/22/2022 04:12 PM    Bacteria Negative 11/22/2022 04:12 PM    Bacteria Negative 11/22/2022 04:12 PM    WBC 20-50 11/22/2022 04:12 PM    WBC 20-50 11/22/2022 04:12 PM    RBC 0-5 11/22/2022 04:12 PM    RBC 0-5 11/22/2022 04:12 PM Assessment:     KP. ora with rapid ventricular rate  COPD  Chronic hypoxic respiratory failure on 3 L  Hypertension  Hypothyroidism  Depression  Neuropathy  UTI  Leukocytosis with steroids      Plan:     Cardizem 240 mg twice a day  COPD follow-up with pulmonologist  Continue other home medications  Continue Lasix 40 mg  Continue Solumedrol, nebulizers as needed  Continue doxycycline   Metoprolol 25 mg    Continue to monitor heart rate    Patient seen by the pulmonologist and plan for put on BiPAP but patient refused    Patient followed by cardiology. She is maxed out on long-acting diltiazem. Per cardiology, cannot increase metoprolol dose as it will make her COPD worse. Patient is reluctant to use amiodarone due to her advanced COPD. And elevated LFT She will be high risk AV node ablation with permanent pacemaker as she is not likely to tolerate sedation due to her advanced COPD.        Advance BB for better HR control  Try verapamil with diltiazem  Consider adding digoxin if rate control remains problematic  Pacemaker and AV junction ablation seems the best option long term    Pending PT/OT eval.         Current Facility-Administered Medications:     metoprolol tartrate (LOPRESSOR) tablet 50 mg, 50 mg, Oral, BID, Ji Holman MD    verapamiL (CALAN) tablet 120 mg, 120 mg, Oral, TID, Lamberto Quinonez MD, 120 mg at 11/29/22 1213    tiotropium bromide (SPIRIVA RESPIMAT) 2.5 mcg /actuation, 2 [place], Inhalation, DAILY, Leodan Wilson MD, 2 Puff at 11/29/22 0738    albuterol-ipratropium (DUO-NEB) 2.5 MG-0.5 MG/3 ML, 3 mL, Nebulization, Q6H PRN, Asim Flores MD, 3 mL at 11/28/22 1319    dilTIAZem ER (CARDIZEM CD) capsule 240 mg, 240 mg, Oral, BID, Elodia Booth MD, 240 mg at 11/29/22 0919    sodium chloride (OCEAN) 0.65 % nasal squeeze bottle 2 Spray, 2 Spray, Both Nostrils, Q2H PRN, Leodan Wilson MD, 2 [place] at 11/29/22 1121    polyethylene glycol (MIRALAX) packet 17 g, 17 g, Oral, DAILY, Mark Bryant Guzmán MD, 17 g at 11/25/22 3044    doxycycline (VIBRAMYCIN) 100 mg in 0.9% sodium chloride (MBP/ADV) 100 mL MBP, 100 mg, IntraVENous, Q12H, Tyrone Olivo MD, Last Rate: 100 mL/hr at 11/29/22 1213, 100 mg at 11/29/22 1213    apixaban (ELIQUIS) tablet 5 mg, 5 mg, Oral, BID, Jovanna Cabrera MD, 5 mg at 11/29/22 0919    budesonide-formoteroL (SYMBICORT) 160-4.5 mcg/actuation HFA inhaler 2 Puff, 2 Puff, Inhalation, BID, Asim Flores MD, 2 Puff at 11/29/22 0736    hydrOXYzine HCL (ATARAX) tablet 25 mg, 25 mg, Oral, TID PRN, Asim Flores MD, 25 mg at 11/29/22 1212    montelukast (SINGULAIR) tablet 10 mg, 10 mg, Oral, DAILY, Asim Flores MD, 10 mg at 11/29/22 0919    [Held by provider] nortriptyline (PAMELOR) capsule 10 mg, 10 mg, Oral, QHS, Bryant Flores MD    pantoprazole (PROTONIX) tablet 40 mg, 40 mg, Oral, DAILY, Asim Flores MD, 40 mg at 11/29/22 0919    pravastatin (PRAVACHOL) tablet 40 mg, 40 mg, Oral, DAILY, Asim Flores MD, 40 mg at 11/29/22 0919    glucose chewable tablet 16 g, 4 Tablet, Oral, PRN, Bryant Flores MD    glucagon (GLUCAGEN) injection 1 mg, 1 mg, IntraMUSCular, PRN, Bryant Flores MD    acetaminophen (TYLENOL) tablet 650 mg, 650 mg, Oral, Q6H PRN, 650 mg at 11/29/22 0224 **OR** acetaminophen (TYLENOL) suppository 650 mg, 650 mg, Rectal, Q6H PRN, Asim Flores MD    polyethylene glycol (MIRALAX) packet 17 g, 17 g, Oral, DAILY PRN, Asim Flores MD, 17 g at 11/24/22 1636    ondansetron (ZOFRAN ODT) tablet 4 mg, 4 mg, Oral, Q8H PRN **OR** ondansetron (ZOFRAN) injection 4 mg, 4 mg, IntraVENous, Q6H PRN, Asim Flores MD    levothyroxine (SYNTHROID) tablet 75 mcg, 75 mcg, Oral, ACB, Asim Flores MD, 75 mcg at 11/28/22 0844    DULoxetine (CYMBALTA) capsule 60 mg, 60 mg, Oral, DAILY, Asim Flores MD, 60 mg at 11/29/22 0919    gabapentin (NEURONTIN) capsule 400 mg, 400 mg, Oral, BID, Asim Flores MD, 400 mg at 11/29/22 9643 hydrOXYchloroQUINE (PLAQUENIL) tablet 200 mg, 200 mg, Oral, DAILY WITH BREAKFAST, Asim Flores MD, 200 mg at 11/29/22 0919    methylPREDNISolone (PF) (SOLU-MEDROL) injection 40 mg, 40 mg, IntraVENous, Q6H, Asim Flores MD, 40 mg at 11/29/22 1212

## 2022-11-29 NOTE — PROGRESS NOTES
General Daily Progress Note          Patient Name:   Lizbeth Pringle       YOB: 1950       Age:  67 y.o. Admit Date: 11/22/2022      Subjective:     Patient is a 70y.o. year old female with signal past medical history of chronic A. fib not on anticoagulation secondary to bleed COPD hypothyroidism hyperlipidemia chronic pain neuropathy came to emergency room  Complaining of shortness of breath for last 2 weeks and worsening patient normally at 3 L oxygen by nasal cannula for COPD at home Emergency room seen by the ER physician work-up and showed patient on A. fib with rapid ventricular rate patient received 10 mg of IV Cardizem twice but patient still in rapid ventricular rate patient on Cardizem drip cardiology was consulted by the ER physician cardiology recommended patient to be admitted for further evaluation and treatment patient denies any fever chills has not shortness of breath no nausea no vomiting  In the ER blood work done BNP of 1300      Patient still on Cardizem drip heart rate fluctuating       11/24  Patient is resting comfortably in bed and has no complaints. States breathing treatments help. D Dimer 0.61  Elevated WBC count 15.7K    11/28  A. Fib with RVR, followed by cardiology, on Diltiazem 240 mg twice daily. Today patient states she feels sleepy, but has no other complaints. 11/29  Patient wants to go home. She is requesting xanax for anxiety. ECHO results    Left Ventricle: Normal left ventricular systolic function with a visually estimated EF of 50 - 55%. Left ventricle is smaller than normal. Mild septal thickening. Normal wall motion. Mitral Valve: Mild regurgitation. Tricuspid Valve: The estimated RVSP is 47 mmHg.     Objective:     Visit Vitals  BP (!) 144/86 (BP 1 Location: Left upper arm, BP Patient Position: At rest;Lying)   Pulse (!) 108   Temp 98.6 °F (37 °C)   Resp 22   Ht 5' 4\" (1.626 m)   Wt 61.2 kg (134 lb 14.7 oz)   SpO2 92% Breastfeeding No   BMI 23.16 kg/m²        Recent Results (from the past 24 hour(s))   BLOOD GAS, ARTERIAL    Collection Time: 11/28/22 10:03 AM   Result Value Ref Range    pH 7.31 (L) 7.35 - 7.45      PCO2 59 (H) 35 - 45 mmHg    PO2 99 80 - 100 mmHg    O2 SATURATION 97 95 - 99 %    BICARBONATE 29 (H) 22 - 26 mmol/L    BASE EXCESS 2.1 0 - 3 mmol/L    O2 METHOD Nasal Cannula      O2 FLOW RATE 3.00 L/min    FIO2 32 %    Sample source Arterial      SITE Right Radial      GUMARO'S TEST YES      Carboxy-Hgb 0.3 (L) 1 - 2 %    Methemoglobin 0.3 0 - 1.4 %    Oxyhemoglobin 96.3 95 - 99 %    Performed by James Ibrahim     TEMPERATURE 98.6         [unfilled]      Review of Systems    Constitutional: Negative for chills and fever. HENT: Negative. Eyes: Negative. Respiratory: Negative. Cardiovascular: Negative. Gastrointestinal: Negative for abdominal pain and nausea. Skin: Negative. Neurological: Negative. Physical Exam:      Constitutional: pt is oriented to person, place, and time. HENT:   Head: Normocephalic and atraumatic. Eyes: Pupils are equal, round, and reactive to light. EOM are normal.   Cardiovascular: Normal rate, regular rhythm and normal heart sounds. Pulmonary/Chest: Breath sounds normal. No wheezes. No rales. Exhibits no tenderness. Abdominal: Soft. Bowel sounds are normal. There is no abdominal tenderness. There is no rebound and no guarding. Musculoskeletal: Normal range of motion. Neurological: pt is alert and oriented to person, place, and time. XR CHEST PORT   Final Result   Emphysematous but clear lungs. CTA CHEST W OR W WO CONT   Final Result   1. No evidence of pulmonary embolus. 2.  Small right pleural effusion with mild right basilar airspace disease. 3. Centrilobular emphysema. 4. Cardiomegaly.             XR CHEST PORT   Final Result      No acute process on portable chest.              Recent Results (from the past 24 hour(s))   BLOOD GAS, ARTERIAL    Collection Time: 11/28/22 10:03 AM   Result Value Ref Range    pH 7.31 (L) 7.35 - 7.45      PCO2 59 (H) 35 - 45 mmHg    PO2 99 80 - 100 mmHg    O2 SATURATION 97 95 - 99 %    BICARBONATE 29 (H) 22 - 26 mmol/L    BASE EXCESS 2.1 0 - 3 mmol/L    O2 METHOD Nasal Cannula      O2 FLOW RATE 3.00 L/min    FIO2 32 %    Sample source Arterial      SITE Right Radial      GUMARO'S TEST YES      Carboxy-Hgb 0.3 (L) 1 - 2 %    Methemoglobin 0.3 0 - 1.4 %    Oxyhemoglobin 96.3 95 - 99 %    Performed by Chinomnso Makinde     TEMPERATURE 98.6           Results       Procedure Component Value Units Date/Time    CULTURE, URINE [[id number]] Collected: 11/23/22 1555    Order Status: Completed Specimen: Urine Updated: 11/25/22 1236     Special Requests: No Special Requests        Culture result: No Growth (<1000 cfu/mL)       CULTURE, BLOOD, PAIRED [[id number]]     Order Status: Canceled Specimen: Blood     COVID-19 RAPID TEST [[id number]]     Order Status: Canceled     INFLUENZA A & B AG (RAPID TEST) [[id number]]     Order Status: Canceled Specimen: Nasopharyngeal              Labs:     No results for input(s): WBC, HGB, HCT, PLT, HGBEXT, HCTEXT, PLTEXT, HGBEXT, HCTEXT, PLTEXT in the last 72 hours. No results for input(s): NA, K, CL, CO2, BUN, CREA, GLU, CA, MG, PHOS, URICA in the last 72 hours. No results for input(s): ALT, AP, TBIL, TBILI, TP, ALB, GLOB, GGT, AML, LPSE in the last 72 hours. No lab exists for component: SGOT, GPT, AMYP, HLPSE    No results for input(s): INR, PTP, APTT, INREXT, INREXT in the last 72 hours. No results for input(s): FE, TIBC, PSAT, FERR in the last 72 hours. Lab Results   Component Value Date/Time    Folate 12.3 07/12/2018 08:10 AM        Recent Labs     11/28/22  1003   PH 7.31*   PCO2 59*   PO2 99       No results for input(s): CPK, CKNDX, TROIQ in the last 72 hours.     No lab exists for component: CPKMB  No results found for: CHOL, CHOLX, CHLST, CHOLV, HDL, HDLP, LDL, LDLC, DLDLP, TGLX, TRIGL, TRIGP, CHHD, CHHDX  No results found for: [facility]  Lab Results   Component Value Date/Time    Color Yellow/Straw 11/22/2022 04:12 PM    Appearance Clear 11/22/2022 04:12 PM    Specific gravity 1.005 11/22/2022 04:12 PM    pH (UA) 7.0 11/22/2022 04:12 PM    Protein Negative 11/22/2022 04:12 PM    Glucose Negative 11/22/2022 04:12 PM    Ketone Negative 11/22/2022 04:12 PM    Bilirubin Negative 11/22/2022 04:12 PM    Urobilinogen 0.1 11/22/2022 04:12 PM    Nitrites Negative 11/22/2022 04:12 PM    Leukocyte Esterase Moderate (A) 11/22/2022 04:12 PM    Bacteria Negative 11/22/2022 04:12 PM    Bacteria Negative 11/22/2022 04:12 PM    WBC 20-50 11/22/2022 04:12 PM    WBC 20-50 11/22/2022 04:12 PM    RBC 0-5 11/22/2022 04:12 PM    RBC 0-5 11/22/2022 04:12 PM         Assessment:     A. fib with rapid ventricular rate  COPD  Chronic hypoxic respiratory failure on 3 L  Hypertension  Hypothyroidism  Depression  Neuropathy  UTI  Leukocytosis with steroids      Plan:     Cardizem 240 mg twice a day  COPD follow-up with pulmonologist  Continue other home medications  Continue Lasix 40 mg  Continue Solumedrol, nebulizers as needed  Continue doxycycline   Metoprolol 25 mg    Continue to monitor heart rate    Patient seen by the pulmonologist and plan for put on BiPAP but patient refused    Patient followed by cardiology. She is maxed out on long-acting diltiazem. Per cardiology, cannot increase metoprolol dose as it will make her COPD worse. Patient is reluctant to use amiodarone due to her advanced COPD. She will be high risk AV node ablation with permanent pacemaker as she is not likely to tolerate sedation due to her advanced COPD.      Pending PT/OT eval.         Current Facility-Administered Medications:     tiotropium bromide (SPIRIVA RESPIMAT) 2.5 mcg Ray Apt, 2 [place], Inhalation, DAILY, Donna Dave MD, 2 Puff at 11/29/22 0738    albuterol-ipratropium (DUO-NEB) 2.5 MG-0.5 MG/3 ML, 3 mL, Nebulization, Q6H PRN, Asim Flores MD, 3 mL at 11/28/22 1319    dilTIAZem ER (CARDIZEM CD) capsule 240 mg, 240 mg, Oral, BID, Marco Alan MD, 240 mg at 11/28/22 2127    sodium chloride (OCEAN) 0.65 % nasal squeeze bottle 2 Spray, 2 Spray, Both Nostrils, Q2H PRN, Alex Zepeda MD    polyethylene glycol (MIRALAX) packet 17 g, 17 g, Oral, DAILY, Asim Flores MD, 17 g at 11/25/22 0902    doxycycline (VIBRAMYCIN) 100 mg in 0.9% sodium chloride (MBP/ADV) 100 mL MBP, 100 mg, IntraVENous, Q12H, Tyrone Olivo MD, Last Rate: 100 mL/hr at 11/28/22 2355, 100 mg at 11/28/22 2355    metoprolol tartrate (LOPRESSOR) tablet 25 mg, 25 mg, Oral, BID, Melissa Blum MD, 25 mg at 11/28/22 1559    apixaban (ELIQUIS) tablet 5 mg, 5 mg, Oral, BID, Melissa Blum MD, 5 mg at 11/28/22 2127    budesonide-formoteroL (SYMBICORT) 160-4.5 mcg/actuation HFA inhaler 2 Puff, 2 Puff, Inhalation, BID, Asim Flores MD, 2 Puff at 11/29/22 0736    hydrOXYzine HCL (ATARAX) tablet 25 mg, 25 mg, Oral, TID PRN, Asim Flroes MD    montelukast (SINGULAIR) tablet 10 mg, 10 mg, Oral, DAILY, Asim Flores MD, 10 mg at 11/28/22 0844    [Held by provider] nortriptyline (PAMELOR) capsule 10 mg, 10 mg, Oral, QHS, Asim Flores MD    pantoprazole (PROTONIX) tablet 40 mg, 40 mg, Oral, DAILY, Asim Flores MD, 40 mg at 11/28/22 0844    pravastatin (PRAVACHOL) tablet 40 mg, 40 mg, Oral, DAILY, Asim Flores MD, 40 mg at 11/28/22 0844    glucose chewable tablet 16 g, 4 Tablet, Oral, PRN, Asim Flores MD    glucagon (GLUCAGEN) injection 1 mg, 1 mg, IntraMUSCular, PRN, Asim Flores MD    acetaminophen (TYLENOL) tablet 650 mg, 650 mg, Oral, Q6H PRN, 650 mg at 11/29/22 0224 **OR** acetaminophen (TYLENOL) suppository 650 mg, 650 mg, Rectal, Q6H PRN, Asim Flores MD    polyethylene glycol (MIRALAX) packet 17 g, 17 g, Oral, DAILY PRN, Asim Flores MD, 17 g at 11/24/22 1636    ondansetron (ZOFRAN ODT) tablet 4 mg, 4 mg, Oral, Q8H PRN **OR** ondansetron (ZOFRAN) injection 4 mg, 4 mg, IntraVENous, Q6H PRN, Asim Flores MD    levothyroxine (SYNTHROID) tablet 75 mcg, 75 mcg, Oral, ACB, Asim Flores MD, 75 mcg at 11/28/22 0844    DULoxetine (CYMBALTA) capsule 60 mg, 60 mg, Oral, DAILY, Asim Flores MD, 60 mg at 11/28/22 0844    gabapentin (NEURONTIN) capsule 400 mg, 400 mg, Oral, BID, Asim Flores MD, 400 mg at 11/28/22 2127    hydrOXYchloroQUINE (PLAQUENIL) tablet 200 mg, 200 mg, Oral, DAILY WITH BREAKFAST, Asim Flores MD, 200 mg at 11/28/22 0844    methylPREDNISolone (PF) (SOLU-MEDROL) injection 40 mg, 40 mg, IntraVENous, Q6H, Asim Flores MD, 40 mg at 11/28/22 1130

## 2022-11-29 NOTE — PROGRESS NOTES
Progress Note      2022 11:32 AM  NAME: Ysabel Kong   MRN:  [id number]   Admit Diagnosis: Atrial fibrillation with RVR ([facility]) [I48.91]  Atrial fibrillation ([address] [I48.91]      Problem List:   Atrial fibrillation with RVR  Chronic COPD on home oxygen  Pulmonary hypertension  Anxiety     Assessment/Plan:   Advance BB for better HR control  Try verapamil with diltiazem  Consider adding digoxin if rate control remains problematic  Pacemaker and AV junction ablation seems the best option long term         []       High complexity decision making was performed in this patient at high risk for decompensation with multiple organ involvement. Subjective:     Ysabel Kong reports anxiety. Discussed with RN events overnight. Review of Systems:   Negative except for as noted above. Objective:      Physical Exam:    Last 24hrs VS reviewed since prior progress note. Most recent are:    Visit Vitals  BP (!) 144/86 (BP 1 Location: Left upper arm, BP Patient Position: At rest;Lying)   Pulse (!) 108   Temp 98.6 °F (37 °C)   Resp 22   Ht 5' 4\" (1.626 m)   Wt 61.2 kg (134 lb 14.7 oz)   SpO2 92%   Breastfeeding No   BMI 23.16 kg/m²     No intake or output data in the 24 hours ending 22 1132     General Appearance: Alert; no acute distress. Ears/Nose/Mouth/Throat: moist mucous membranes  Neck: Supple. Chest: Lungs clear to auscultation bilaterally. Cardiovascular: Regular rate and rhythm, S1S2 normal  Abdomen: Soft, non-tender, bowel sounds are active. Extremities: No edema bilaterally. Skin: Warm and dry. []         Post-cath site without hematoma, bruit, tenderness, or thrill. Distal pulses intact. PMH/SH reviewed - no change compared to H&P    Telemetry: Afib     EK/22/22    ECHO ADULT COMPLETE 2022    Interpretation Summary    Left Ventricle: Normal left ventricular systolic function with a visually estimated EF of 50 - 55%.  Left ventricle is smaller than normal. Mild septal thickening. Normal wall motion. Mitral Valve: Mild regurgitation. Tricuspid Valve: The estimated RVSP is 47 mmHg. Signed by: Akanksha Leonard MD on 11/23/2022  4:54 PM      []  No new EKG for review    Lab Data Personally Reviewed:    Recent Labs     11/29/22  0938   WBC 13.9*   HGB 9.6*   HCT 30.3*        No results for input(s): INR, PTP, APTT, INREXT in the last 72 hours. Recent Labs     11/29/22  0938   *   K 5.1   CL 94*   CO2 34*   BUN 29*   CREA 0.69   *   CA 8.8     No results for input(s): CPK, CKNDX, TROIQ in the last 72 hours.     No lab exists for component: CPKMB  No results found for: CHOL, CHOLX, CHLST, CHOLV, HDL, HDLP, LDL, LDLC, DLDLP, TGLX, TRIGL, TRIGP, CHHD, CHHDX    Recent Labs     11/29/22  0938   AP 59   TP 6.6   ALB 3.9   GLOB 2.7     Recent Labs     11/28/22  1003   PH 7.31*   PCO2 59*   PO2 99       Medications Personally Reviewed:    Current Facility-Administered Medications   Medication Dose Route Frequency    tiotropium bromide (SPIRIVA RESPIMAT) 2.5 mcg /actuation  2 Puff Inhalation DAILY    albuterol-ipratropium (DUO-NEB) 2.5 MG-0.5 MG/3 ML  3 mL Nebulization Q6H PRN    dilTIAZem ER (CARDIZEM CD) capsule 240 mg  240 mg Oral BID    sodium chloride (OCEAN) 0.65 % nasal squeeze bottle 2 Spray  2 Spray Both Nostrils Q2H PRN    polyethylene glycol (MIRALAX) packet 17 g  17 g Oral DAILY    doxycycline (VIBRAMYCIN) 100 mg in 0.9% sodium chloride (MBP/ADV) 100 mL MBP  100 mg IntraVENous Q12H    metoprolol tartrate (LOPRESSOR) tablet 25 mg  25 mg Oral BID    apixaban (ELIQUIS) tablet 5 mg  5 mg Oral BID    budesonide-formoteroL (SYMBICORT) 160-4.5 mcg/actuation HFA inhaler 2 Puff  2 Puff Inhalation BID    hydrOXYzine HCL (ATARAX) tablet 25 mg  25 mg Oral TID PRN    montelukast (SINGULAIR) tablet 10 mg  10 mg Oral DAILY    [Held by provider] nortriptyline (PAMELOR) capsule 10 mg  10 mg Oral QHS    pantoprazole (PROTONIX) tablet 40 mg  40 mg Oral DAILY    pravastatin (PRAVACHOL) tablet 40 mg  40 mg Oral DAILY    glucose chewable tablet 16 g  4 Tablet Oral PRN    glucagon (GLUCAGEN) injection 1 mg  1 mg IntraMUSCular PRN    acetaminophen (TYLENOL) tablet 650 mg  650 mg Oral Q6H PRN    Or    acetaminophen (TYLENOL) suppository 650 mg  650 mg Rectal Q6H PRN    polyethylene glycol (MIRALAX) packet 17 g  17 g Oral DAILY PRN    ondansetron (ZOFRAN ODT) tablet 4 mg  4 mg Oral Q8H PRN    Or    ondansetron (ZOFRAN) injection 4 mg  4 mg IntraVENous Q6H PRN    levothyroxine (SYNTHROID) tablet 75 mcg  75 mcg Oral ACB    DULoxetine (CYMBALTA) capsule 60 mg  60 mg Oral DAILY    gabapentin (NEURONTIN) capsule 400 mg  400 mg Oral BID    hydrOXYchloroQUINE (PLAQUENIL) tablet 200 mg  200 mg Oral DAILY WITH BREAKFAST    methylPREDNISolone (PF) (SOLU-MEDROL) injection 40 mg  40 mg IntraVENous Danilo Plaza MD

## 2022-11-29 NOTE — PROGRESS NOTES
Nutrition Assessment     Type and Reason for Visit: RD nutrition re-screen/LOS    Nutrition Recommendations/Plan:   Continue current diet  Monitor and record PO intakes and Bms in I/Os     Nutrition Assessment:  Admitted for atrial fibrillation with RVR. Intakes good since admission, >50%. Hx desired weight gain per [facility]. Labs: Na 135, K 5.1, BUN 29, Creat 0.69, Gluc 156, Alb 3.9. Meds: levothyroxine, pantoprazole, pravastatin    Malnutrition Assessment:  Malnutrition Status: Mild malnutrition     Estimated Daily Nutrient Needs:  Energy (kcal):  1530kcal (25kcal/kg)  Protein (g):  61g (1g/kg)       Fluid (ml/day):  1530mL    Nutrition Related Findings:  Nourished per NFPE. No n/v, d/c, or problems chewing/swallowing. No edema. BM 11/26.     Current Nutrition Therapies:  ADULT DIET Regular; Low Fat/Low Chol/High Fiber/2 gm Na    Anthropometric Measures:  Height:  5' 4.02\" (162.6 cm)  Current Body Wt:  61.2 kg (134 lb 14.7 oz)  BMI: 23.1    Nutrition Diagnosis:   (P) No nutrition diagnosis at this time     Nutrition Interventions:   Food and/or Nutrient Delivery: (P) Continue current diet  Nutrition Education/Counseling: (P) Education not indicated  Coordination of Nutrition Care: (P) Continue to monitor while inpatient    Nutrition Monitoring and Evaluation:   Behavioral-Environmental Outcomes: (P) None identified  Food/Nutrient Intake Outcomes: (P) Food and nutrient intake  Physical Signs/Symptoms Outcomes: (P) Meal time behavior, Weight    Discharge Planning:    (P) Too soon to determine    Alecia Orta RD  Contact: [phone]

## 2022-11-30 VITALS
DIASTOLIC BLOOD PRESSURE: 84 MMHG | HEIGHT: 64 IN | SYSTOLIC BLOOD PRESSURE: 134 MMHG | WEIGHT: 140.6 LBS | HEART RATE: 96 BPM | BODY MASS INDEX: 24.01 KG/M2 | TEMPERATURE: 99.2 F | OXYGEN SATURATION: 97 % | RESPIRATION RATE: 16 BRPM

## 2022-11-30 LAB
ALBUMIN SERPL-MCNC: 3.8 G/DL (ref 3.5–5)
ALBUMIN/GLOB SERPL: 1.4 {RATIO} (ref 1.1–2.2)
ALP SERPL-CCNC: 58 U/L (ref 45–117)
ALT SERPL-CCNC: 96 U/L (ref 12–78)
ANION GAP SERPL CALC-SCNC: 5 MMOL/L (ref 5–15)
AST SERPL W P-5'-P-CCNC: 37 U/L (ref 15–37)
BILIRUB SERPL-MCNC: 0.6 MG/DL (ref 0.2–1)
BUN SERPL-MCNC: 28 MG/DL (ref 6–20)
BUN/CREAT SERPL: 39 (ref 12–20)
CA-I BLD-MCNC: 9 MG/DL (ref 8.5–10.1)
CHLORIDE SERPL-SCNC: 95 MMOL/L (ref 97–108)
CO2 SERPL-SCNC: 34 MMOL/L (ref 21–32)
CREAT SERPL-MCNC: 0.71 MG/DL (ref 0.55–1.02)
ERYTHROCYTE [DISTWIDTH] IN BLOOD BY AUTOMATED COUNT: 14.1 % (ref 11.5–14.5)
GLOBULIN SER CALC-MCNC: 2.7 G/DL (ref 2–4)
GLUCOSE SERPL-MCNC: 155 MG/DL (ref 65–100)
HCT VFR BLD AUTO: 30 % (ref 35–47)
HGB BLD-MCNC: 9.7 G/DL (ref 11.5–16)
MCH RBC QN AUTO: 30 PG (ref 26–34)
MCHC RBC AUTO-ENTMCNC: 32.3 G/DL (ref 30–36.5)
MCV RBC AUTO: 92.9 FL (ref 80–99)
NRBC # BLD: 0.05 K/UL (ref 0–0.01)
NRBC BLD-RTO: 0.4 PER 100 WBC
PLATELET # BLD AUTO: 330 K/UL (ref 150–400)
PMV BLD AUTO: 10.5 FL (ref 8.9–12.9)
POTASSIUM SERPL-SCNC: 5.3 MMOL/L (ref 3.5–5.1)
PROT SERPL-MCNC: 6.5 G/DL (ref 6.4–8.2)
RBC # BLD AUTO: 3.23 M/UL (ref 3.8–5.2)
SODIUM SERPL-SCNC: 134 MMOL/L (ref 136–145)
WBC # BLD AUTO: 12.2 K/UL (ref 3.6–11)

## 2022-11-30 PROCEDURE — 74011250637 HC RX REV CODE- 250/637: Performed by: INTERNAL MEDICINE

## 2022-11-30 PROCEDURE — 36415 COLL VENOUS BLD VENIPUNCTURE: CPT

## 2022-11-30 PROCEDURE — 94761 N-INVAS EAR/PLS OXIMETRY MLT: CPT

## 2022-11-30 PROCEDURE — 85027 COMPLETE CBC AUTOMATED: CPT

## 2022-11-30 PROCEDURE — 74011250636 HC RX REV CODE- 250/636: Performed by: INTERNAL MEDICINE

## 2022-11-30 PROCEDURE — 74011250637 HC RX REV CODE- 250/637: Performed by: FAMILY MEDICINE

## 2022-11-30 PROCEDURE — 80053 COMPREHEN METABOLIC PANEL: CPT

## 2022-11-30 RX ORDER — LEVOTHYROXINE SODIUM 75 UG/1
75 TABLET ORAL
Qty: 60 TABLET | Refills: 0 | Status: SHIPPED | OUTPATIENT
Start: 2022-12-01

## 2022-11-30 RX ORDER — DILTIAZEM HYDROCHLORIDE 240 MG/1
240 CAPSULE, COATED, EXTENDED RELEASE ORAL 2 TIMES DAILY
Qty: 30 CAPSULE | Refills: 2 | Status: SHIPPED | OUTPATIENT
Start: 2022-11-30

## 2022-11-30 RX ORDER — PREDNISONE 10 MG/1
TABLET ORAL
Qty: 20 TABLET | Refills: 0 | Status: SHIPPED | OUTPATIENT
Start: 2022-11-30

## 2022-11-30 RX ORDER — HYDROXYCHLOROQUINE SULFATE 200 MG/1
200 TABLET, FILM COATED ORAL
Qty: 30 TABLET | Refills: 0 | Status: SHIPPED | OUTPATIENT
Start: 2022-12-01

## 2022-11-30 RX ORDER — SODIUM POLYSTYRENE SULFONATE 15 G/60ML
30 SUSPENSION ORAL; RECTAL
Status: COMPLETED | OUTPATIENT
Start: 2022-11-30 | End: 2022-11-30

## 2022-11-30 RX ORDER — METOPROLOL TARTRATE 50 MG/1
50 TABLET ORAL 2 TIMES DAILY
Qty: 60 TABLET | Refills: 0 | Status: SHIPPED | OUTPATIENT
Start: 2022-11-30

## 2022-11-30 RX ORDER — DOXYCYCLINE 100 MG/1
100 CAPSULE ORAL 2 TIMES DAILY
Qty: 20 CAPSULE | Refills: 0 | Status: SHIPPED | OUTPATIENT
Start: 2022-11-30

## 2022-11-30 RX ORDER — VERAPAMIL HYDROCHLORIDE 120 MG/1
120 TABLET, FILM COATED ORAL 3 TIMES DAILY
Qty: 30 TABLET | Refills: 0 | Status: SHIPPED | OUTPATIENT
Start: 2022-11-30

## 2022-11-30 RX ORDER — DULOXETIN HYDROCHLORIDE 60 MG/1
60 CAPSULE, DELAYED RELEASE ORAL DAILY
Qty: 60 CAPSULE | Refills: 0 | Status: SHIPPED | OUTPATIENT
Start: 2022-12-01

## 2022-11-30 RX ADMIN — DILTIAZEM HYDROCHLORIDE 240 MG: 120 CAPSULE, COATED, EXTENDED RELEASE ORAL at 08:28

## 2022-11-30 RX ADMIN — APIXABAN 5 MG: 5 TABLET, FILM COATED ORAL at 08:28

## 2022-11-30 RX ADMIN — METHYLPREDNISOLONE SODIUM SUCCINATE 40 MG: 40 INJECTION, POWDER, FOR SOLUTION INTRAMUSCULAR; INTRAVENOUS at 05:47

## 2022-11-30 RX ADMIN — PRAVASTATIN SODIUM 40 MG: 40 TABLET ORAL at 08:29

## 2022-11-30 RX ADMIN — HYDROXYCHLOROQUINE SULFATE 200 MG: 200 TABLET, FILM COATED ORAL at 08:28

## 2022-11-30 RX ADMIN — LEVOTHYROXINE SODIUM 75 MCG: 0.07 TABLET ORAL at 08:29

## 2022-11-30 RX ADMIN — PANTOPRAZOLE SODIUM 40 MG: 40 TABLET, DELAYED RELEASE ORAL at 08:29

## 2022-11-30 RX ADMIN — MONTELUKAST 10 MG: 10 TABLET, FILM COATED ORAL at 08:29

## 2022-11-30 RX ADMIN — GABAPENTIN 400 MG: 400 CAPSULE ORAL at 08:29

## 2022-11-30 RX ADMIN — SODIUM POLYSTYRENE SULFONATE 30 G: 15 SUSPENSION ORAL; RECTAL at 14:18

## 2022-11-30 RX ADMIN — METOPROLOL TARTRATE 50 MG: 50 TABLET, FILM COATED ORAL at 08:29

## 2022-11-30 RX ADMIN — DULOXETINE HYDROCHLORIDE 60 MG: 30 CAPSULE, DELAYED RELEASE ORAL at 08:29

## 2022-11-30 NOTE — PROGRESS NOTES
CM met with patient to discuss DC. Attending [facility] patient to d/c with HH, SARAI explained HH, patient stated she does not need PT/OT or SN at this time. Patient reports that she has been walking around the room and feels safe to d/c to home at this time and her daughter lives 3 minutes down the road and is able to assist as needed. Family in room raised no concerns. CM explained that [facility] could be set up at a later time when she follows up with her PCP. Medicare pt has received, reviewed, and signed 2nd IM letter informing them of their right to appeal the discharge. Signed copied has been placed on pt bedside chart. Patient clear to d/c from CM at this time.

## 2022-11-30 NOTE — PROGRESS NOTES
Progress Note      2022 3:33 PM  NAME: Luisa Arciniega   MRN:  [id number]   Admit Diagnosis: Atrial fibrillation with RVR ([address] [I48.91]  Atrial fibrillation ([address] [I48.91]      Problem List:   Atrial fibrillation with RVR  Chronic COPD on home oxygen  Pulmonary hypertension  Anxiety     Assessment/Plan:   Continue with dual CCB and beta-blocker for heart rate control-> adequate rate control today  Digoxin can be added if needed for uncontrolled heart rate  Patient follow-up with primary cardiologist regarding AV junction ablation         []       High complexity decision making was performed in this patient at high risk for decompensation with multiple organ involvement. Subjective:     Luisa Arciniega with exertional dyspnea. Discussed with RN events overnight. Review of Systems:   Negative except for as noted above. Objective:      Physical Exam:    Last 24hrs VS reviewed since prior progress note. Most recent are:    Visit Vitals  /84 (BP 1 Location: Right upper arm)   Pulse 96   Temp 99.2 °F (37.3 °C)   Resp 16   Ht 5' 4.02\" (1.626 m)   Wt 63.8 kg (140 lb 9.6 oz)   SpO2 97%   Breastfeeding No   BMI 24.12 kg/m²     No intake or output data in the 24 hours ending 22 1533     General Appearance: Alert; no acute distress. Ears/Nose/Mouth/Throat: moist mucous membranes  Neck: Supple. Chest: Lungs clear to auscultation bilaterally. Cardiovascular: Regular rate and rhythm, S1S2 normal  Abdomen: Soft, non-tender, bowel sounds are active. Extremities: No edema bilaterally. Skin: Warm and dry. []         Post-cath site without hematoma, bruit, tenderness, or thrill. Distal pulses intact. PMH/SH reviewed - no change compared to H&P    Telemetry: Afib in 90's  EK/22/22    ECHO ADULT COMPLETE 2022    Interpretation Summary    Left Ventricle: Normal left ventricular systolic function with a visually estimated EF of 50 - 55%.  Left ventricle is smaller than normal. Mild septal thickening. Normal wall motion. Mitral Valve: Mild regurgitation. Tricuspid Valve: The estimated RVSP is 47 mmHg. Signed by: Madonna Mata MD on 11/23/2022  4:54 PM      []  No new EKG for review    Lab Data Personally Reviewed:    Recent Labs     11/30/22  0759 11/29/22  0938   WBC 12.2* 13.9*   HGB 9.7* 9.6*   HCT 30.0* 30.3*    330     No results for input(s): INR, PTP, APTT, INREXT in the last 72 hours. Recent Labs     11/30/22  0759 11/29/22  0938   * 135*   K 5.3* 5.1   CL 95* 94*   CO2 34* 34*   BUN 28* 29*   CREA 0.71 0.69   * 156*   CA 9.0 8.8     No results for input(s): CPK, CKNDX, TROIQ in the last 72 hours.     No lab exists for component: CPKMB  No results found for: CHOL, CHOLX, CHLST, CHOLV, HDL, HDLP, LDL, LDLC, DLDLP, Renaldo Pine, CHHD, CHHDX    Recent Labs     11/30/22  0759 11/29/22  0938   AP 58 59   TP 6.5 6.6   ALB 3.8 3.9   GLOB 2.7 2.7     Recent Labs     11/28/22  1003   PH 7.31*   PCO2 59*   PO2 99       Medications Personally Reviewed:    Current Facility-Administered Medications   Medication Dose Route Frequency    metoprolol tartrate (LOPRESSOR) tablet 50 mg  50 mg Oral BID    verapamiL (CALAN) tablet 120 mg  120 mg Oral TID    methylPREDNISolone (PF) (SOLU-MEDROL) injection 40 mg  40 mg IntraVENous Q8H    tiotropium bromide (SPIRIVA RESPIMAT) 2.5 mcg /actuation  2 Puff Inhalation DAILY    albuterol-ipratropium (DUO-NEB) 2.5 MG-0.5 MG/3 ML  3 mL Nebulization Q6H PRN    dilTIAZem ER (CARDIZEM CD) capsule 240 mg  240 mg Oral BID    sodium chloride (OCEAN) 0.65 % nasal squeeze bottle 2 Spray  2 Spray Both Nostrils Q2H PRN    polyethylene glycol (MIRALAX) packet 17 g  17 g Oral DAILY    doxycycline (VIBRAMYCIN) 100 mg in 0.9% sodium chloride (MBP/ADV) 100 mL MBP  100 mg IntraVENous Q12H    apixaban (ELIQUIS) tablet 5 mg  5 mg Oral BID    budesonide-formoteroL (SYMBICORT) 160-4.5 mcg/actuation HFA inhaler 2 Puff  2 Puff Inhalation BID    hydrOXYzine HCL (ATARAX) tablet 25 mg  25 mg Oral TID PRN    montelukast (SINGULAIR) tablet 10 mg  10 mg Oral DAILY    [Held by provider] nortriptyline (PAMELOR) capsule 10 mg  10 mg Oral QHS    pantoprazole (PROTONIX) tablet 40 mg  40 mg Oral DAILY    pravastatin (PRAVACHOL) tablet 40 mg  40 mg Oral DAILY    glucose chewable tablet 16 g  4 Tablet Oral PRN    glucagon (GLUCAGEN) injection 1 mg  1 mg IntraMUSCular PRN    acetaminophen (TYLENOL) tablet 650 mg  650 mg Oral Q6H PRN    Or    acetaminophen (TYLENOL) suppository 650 mg  650 mg Rectal Q6H PRN    polyethylene glycol (MIRALAX) packet 17 g  17 g Oral DAILY PRN    ondansetron (ZOFRAN ODT) tablet 4 mg  4 mg Oral Q8H PRN    Or    ondansetron (ZOFRAN) injection 4 mg  4 mg IntraVENous Q6H PRN    levothyroxine (SYNTHROID) tablet 75 mcg  75 mcg Oral ACB    DULoxetine (CYMBALTA) capsule 60 mg  60 mg Oral DAILY    gabapentin (NEURONTIN) capsule 400 mg  400 mg Oral BID    hydrOXYchloroQUINE (PLAQUENIL) tablet 200 mg  200 mg Oral DAILY WITH BREAKFAST     Current Outpatient Medications   Medication Sig    dilTIAZem ER (CARDIZEM CD) 240 mg capsule Take 1 Capsule by mouth two (2) times a day. [START ON 12/1/2022] levothyroxine (SYNTHROID) 75 mcg tablet Take 1 Tablet by mouth Daily (before breakfast). apixaban (ELIQUIS) 5 mg tablet Take 1 Tablet by mouth two (2) times a day. [START ON 12/1/2022] DULoxetine (CYMBALTA) 60 mg capsule Take 1 Capsule by mouth daily. [START ON 12/1/2022] hydrOXYchloroQUINE (PLAQUENIL) 200 mg tablet Take 1 Tablet by mouth daily (with breakfast). metoprolol tartrate (LOPRESSOR) 50 mg tablet Take 1 Tablet by mouth two (2) times a day. [START ON 12/1/2022] tiotropium bromide (SPIRIVA RESPIMAT) 2.5 mcg/actuation inhaler Take 2 Puffs by inhalation daily. verapamiL (CALAN) 120 mg tablet Take 1 Tablet by mouth three (3) times daily. doxycycline (MONODOX) 100 mg capsule Take 1 Capsule by mouth two (2) times a day. predniSONE (DELTASONE) 10 mg tablet 2 tablet daily    gabapentin (NEURONTIN) 600 mg tablet Take 600 mg by mouth three (3) times daily. hydrOXYzine HCL (ATARAX) 25 mg tablet Take 25 mg by mouth three (3) times daily as needed. pantoprazole (PROTONIX) 40 mg tablet Take 40 mg by mouth daily. pravastatin (PRAVACHOL) 40 mg tablet Take 40 mg by mouth daily. montelukast (SINGULAIR) 10 mg tablet Take 10 mg by mouth daily. fluticasone-salmeterol (ADVAIR DISKUS) 250-50 mcg/Dose diskus inhaler Take 1 Puff by inhalation every twelve (12) hours. IPRATROPIUM/ALBUTEROL SULFATE (COMBIVENT IN) Take 14.7 g by inhalation four (4) times daily. calcium-vitamin D (OYSTER SHELL) 500 mg(1,250mg) -200 unit per tablet Take 1 Tab by mouth two (2) times daily (with meals).          Diana Lozano MD

## 2022-11-30 NOTE — PROGRESS NOTES
Spiritual Care Assessment/Progress Note  [facility]      NAME: Imer Murry      MRN: [id number]  AGE: 67 y.o.  SEX: female  Buddhist Affiliation: Yazdanism   Language: English     11/30/2022     Total Time (in minutes): 30     Spiritual Assessment begun in [address] through conversation with:         [x]Patient        [] Family    [] Friend(s)        Reason for Consult: Initial/Spiritual assessment, patient floor     Spiritual beliefs: (Please include comment if needed)     [x] Identifies with a tiburcio tradition:         [x] Supported by a tiburcio community:            [] Claims no spiritual orientation:           [] Seeking spiritual identity:                [] Adheres to an individual form of spirituality:           [] Not able to assess:                           Identified resources for coping:      [x] Prayer                               [] Music                  [] Guided Imagery     [x] Family/friends                 [] Pet visits     [] Devotional reading                         [] Unknown     [] Other:                                               Interventions offered during this visit: (See comments for more details)    Patient Interventions: Affirmation of emotions/emotional suffering, Affirmation of tiburcio, Catharsis/review of pertinent events in supportive environment, Coping skills reviewed/reinforced, Guidance concerning next steps/process to be expected, Iconic (affirming the presence of God/Higher Power), Initial/Spiritual assessment, patient floor, Prayer (actual), Buddhist beliefs/image of God discussed           Plan of Care:     [] Support spiritual and/or cultural needs    [] Support AMD and/or advance care planning process      [] Support grieving process   [] Coordinate Rites and/or Rituals    [] Coordination with community clergy   [] No spiritual needs identified at this time   [] Detailed Plan of Care below (See Comments)  [] Make referral to Music Therapy  [] Make referral to Pet Therapy     [] Make referral to Addiction services  [] Make referral to [facility]  [] Make referral to Spiritual Care Partner  [] No future visits requested        [x] Contact Spiritual Care for further referrals     Comments: The purpose of the visit was to do a spiritual assessment on the patient. She shared that she was supported by her loving children and they are each hopeful that she comes soon. She expressed feeling comforted by God's love and how she finds strength in prayer. She talked about how she depends on her children for care and support. She shared that she has always valued her tiburcio in God, and how her hope is that everything will work in her favor and her outcome will be satisfactory. The  listened empathically, extended prayer, facilitated story-telling, affirmed supportive presence, provided the ministry of presence and the comfort of spiritual care. [facility] D.[employer], M.Div.  [address] can be reached by calling the  at [facility]  [phone]

## 2022-11-30 NOTE — DISCHARGE INSTRUCTIONS
Discharge Instructions       PATIENT ID: Shaniqua Brannon  MRN: [id number]   YOB: 1950    DATE OF ADMISSION: [unfilled]    DATE OF DISCHARGE: 11/30/2022    PRIMARY CARE PROVIDER: @PCP@     ATTENDING PHYSICIAN: [unfilled]  DISCHARGING PROVIDER: Jamel Webb MD    To contact this individual call [phone] and ask the  to page. If unavailable ask to be transferred the Adult Hospitalist Department. DISCHARGE DIAGNOSES A. fib    CONSULTATIONS: [unfilled]    PROCEDURES/SURGERIES: * No surgery found *    PENDING TEST RESULTS:   At the time of discharge the following test results are still pending: None    FOLLOW UP APPOINTMENTS:   [unfilled]     ADDITIONAL CARE RECOMMENDATIONS: Follow-up with the cardiology and PCP    DIET: Cardiac Diet      ACTIVITY: Activity as tolerated    Wound care: Wound Care Order: submitted to [facility] Please view [email or web address]    EQUIPMENT needed: ***      DISCHARGE MEDICATIONS:   See Medication Reconciliation Form    It is important that you take the medication exactly as they are prescribed. Keep your medication in the bottles provided by the pharmacist and keep a list of the medication names, dosages, and times to be taken in your wallet. Do not take other medications without consulting your doctor. NOTIFY YOUR PHYSICIAN FOR ANY OF THE FOLLOWING:   Fever over 101 degrees for 24 hours. Chest pain, shortness of breath, fever, chills, nausea, vomiting, diarrhea, change in mentation, falling, weakness, bleeding. Severe pain or pain not relieved by medications. Or, any other signs or symptoms that you may have questions about.       DISPOSITION:    Home With:   OT  PT  HH  RN       SNF/Inpatient Rehab/LTAC    Independent/assisted living    Hospice    Other:         PROBLEM LIST Updated:  Yes ***       Signed:   Jamel Webb MD  11/30/2022  12:57 PM

## 2022-11-30 NOTE — DISCHARGE SUMMARY
Discharge Summary       PATIENT ID: Chrissy Romo  MRN: [id number]   YOB: 1950    DATE OF ADMISSION: 11/22/2022  2:05 PM    DATE OF DISCHARGE:   PRIMARY CARE PROVIDER: Eric Adhikari DO     ATTENDING PHYSICIAN: Kelly Flores  DISCHARGING PROVIDER: Kelly Flores      CONSULTATIONS: IP CONSULT TO CARDIOLOGY  IP CONSULT TO PULMONOLOGY    PROCEDURES/SURGERIES: * No surgery found *    ADMITTING DIAGNOSES:    Patient Active Problem List    Diagnosis Date Noted    Atrial fibrillation with RVR ([facility] 11/22/2022    Atrial fibrillation ([facility] 11/22/2022    Vitamin D deficiency 06/13/2020    GERD without esophagitis 06/13/2020    Chronic obstructive pulmonary disease ([facility] 06/13/2020    Age-related osteoporosis without current pathological fracture 06/12/2020       DISCHARGE DIAGNOSES / PLAN:      A. fib with rapid ventricular rate  COPD  Chronic hypoxic respiratory failure on 3 L  Hypertension  Hypothyroidism  Depression  Neuropathy  UTI  Leukocytosis with steroids         DISCHARGE MEDICATIONS:  Current Discharge Medication List        START taking these medications    Details   apixaban (ELIQUIS) 5 mg tablet Take 1 Tablet by mouth two (2) times a day. Qty: 60 Tablet, Refills: 0  Start date: 11/30/2022      DULoxetine (CYMBALTA) 60 mg capsule Take 1 Capsule by mouth daily. Qty: 60 Capsule, Refills: 0  Start date: 12/1/2022      hydrOXYchloroQUINE (PLAQUENIL) 200 mg tablet Take 1 Tablet by mouth daily (with breakfast). Qty: 30 Tablet, Refills: 0  Start date: 12/1/2022      metoprolol tartrate (LOPRESSOR) 50 mg tablet Take 1 Tablet by mouth two (2) times a day. Qty: 60 Tablet, Refills: 0  Start date: 11/30/2022      tiotropium bromide (SPIRIVA RESPIMAT) 2.5 mcg/actuation inhaler Take 2 Puffs by inhalation daily. Qty: 4 g, Refills: 1  Start date: 12/1/2022      verapamiL (CALAN) 120 mg tablet Take 1 Tablet by mouth three (3) times daily.   Qty: 30 Tablet, Refills: 0  Start date: 11/30/2022 doxycycline (MONODOX) 100 mg capsule Take 1 Capsule by mouth two (2) times a day. Qty: 20 Capsule, Refills: 0  Start date: 11/30/2022      predniSONE (DELTASONE) 10 mg tablet 2 tablet daily  Qty: 20 Tablet, Refills: 0  Start date: 11/30/2022           CONTINUE these medications which have CHANGED    Details   dilTIAZem ER (CARDIZEM CD) 240 mg capsule Take 1 Capsule by mouth two (2) times a day. Qty: 30 Capsule, Refills: 2  Start date: 11/30/2022      levothyroxine (SYNTHROID) 75 mcg tablet Take 1 Tablet by mouth Daily (before breakfast). Qty: 60 Tablet, Refills: 0  Start date: 12/1/2022           CONTINUE these medications which have NOT CHANGED    Details   gabapentin (NEURONTIN) 600 mg tablet Take 600 mg by mouth three (3) times daily. hydrOXYzine HCL (ATARAX) 25 mg tablet Take 25 mg by mouth three (3) times daily as needed. pantoprazole (PROTONIX) 40 mg tablet Take 40 mg by mouth daily. pravastatin (PRAVACHOL) 40 mg tablet Take 40 mg by mouth daily. Refills: 0    Associated Diagnoses: Paresthesia      montelukast (SINGULAIR) 10 mg tablet Take 10 mg by mouth daily. fluticasone-salmeterol (ADVAIR DISKUS) 250-50 mcg/Dose diskus inhaler Take 1 Puff by inhalation every twelve (12) hours. IPRATROPIUM/ALBUTEROL SULFATE (COMBIVENT IN) Take 14.7 g by inhalation four (4) times daily. calcium-vitamin D (OYSTER SHELL) 500 mg(1,250mg) -200 unit per tablet Take 1 Tab by mouth two (2) times daily (with meals). STOP taking these medications       nortriptyline (PAMELOR) 10 mg capsule Comments:   Reason for Stopping:         acetaminophen (TYLENOL EXTRA STRENGTH) 500 mg tablet Comments:   Reason for Stopping:         citalopram (CELEXA) 20 mg tablet Comments:   Reason for Stopping:                 NOTIFY YOUR PHYSICIAN FOR ANY OF THE FOLLOWING:   Fever over 101 degrees for 24 hours.    Chest pain, shortness of breath, fever, chills, nausea, vomiting, diarrhea, change in mentation, falling, weakness, bleeding. Severe pain or pain not relieved by medications. Or, any other signs or symptoms that you may have questions about. DISPOSITION:  x  Home With:   OT  PT  HH  RN       Long term SNF/Inpatient Rehab    Independent/assisted living    Hospice    Other:       PATIENT CONDITION AT DISCHARGE: Stable      PHYSICAL EXAMINATION AT DISCHARGE:  General:          Alert, cooperative, no distress, appears stated age. HEENT:           Atraumatic, anicteric sclerae, pink conjunctivae                          No oral ulcers, mucosa moist, throat clear, dentition fair  Neck:               Supple, symmetrical  Lungs:             Clear to auscultation bilaterally. No Wheezing or Rhonchi. No rales. Chest wall:      No tenderness  No Accessory muscle use. Heart:              Regular  rhythm,  No  murmur   No edema  Abdomen:        Soft, non-tender. Not distended. Bowel sounds normal  Extremities:     No cyanosis. No clubbing,                            Skin turgor normal, Capillary refill normal  Skin:                Not pale. Not Jaundiced  No rashes   Psych:             Not anxious or agitated. Neurologic:      Alert, moves all extremities, answers questions appropriately and responds to commands     XR CHEST PORT   Final Result   Emphysematous but clear lungs. CTA CHEST W OR W WO CONT   Final Result   1. No evidence of pulmonary embolus. 2.  Small right pleural effusion with mild right basilar airspace disease. 3. Centrilobular emphysema. 4. Cardiomegaly.             XR CHEST PORT   Final Result      No acute process on portable chest.              Recent Results (from the past 24 hour(s))   CBC W/O DIFF    Collection Time: 11/30/22  7:59 AM   Result Value Ref Range    WBC 12.2 (H) 3.6 - 11.0 K/uL    RBC 3.23 (L) 3.80 - 5.20 M/uL    HGB 9.7 (L) 11.5 - 16.0 g/dL    HCT 30.0 (L) 35.0 - 47.0 %    MCV 92.9 80.0 - 99.0 FL    MCH 30.0 26.0 - 34.0 PG    MCHC 32.3 30.0 - 36.5 g/dL    RDW 14.1 11.5 - 14.5 %    PLATELET 672 406 - 827 K/uL    MPV 10.5 8.9 - 12.9 FL    NRBC 0.4 (H) 0.0  WBC    ABSOLUTE NRBC 0.05 (H) 0.00 - 1.14 K/uL   METABOLIC PANEL, COMPREHENSIVE    Collection Time: 11/30/22  7:59 AM   Result Value Ref Range    Sodium 134 (L) 136 - 145 mmol/L    Potassium 5.3 (H) 3.5 - 5.1 mmol/L    Chloride 95 (L) 97 - 108 mmol/L    CO2 34 (H) 21 - 32 mmol/L    Anion gap 5 5 - 15 mmol/L    Glucose 155 (H) 65 - 100 mg/dL    BUN 28 (H) 6 - 20 mg/dL    Creatinine 0.71 0.55 - 1.02 mg/dL    BUN/Creatinine ratio 39 (H) 12 - 20      eGFR >60 >60 ml/min/1.73m2    Calcium 9.0 8.5 - 10.1 mg/dL    Bilirubin, total 0.6 0.2 - 1.0 mg/dL    AST (SGOT) 37 15 - 37 U/L    ALT (SGPT) 96 (H) 12 - 78 U/L    Alk. phosphatase 58 45 - 117 U/L    Protein, total 6.5 6.4 - 8.2 g/dL    Albumin 3.8 3.5 - 5.0 g/dL    Globulin 2.7 2.0 - 4.0 g/dL    A-G Ratio 1.4 1.1 - 2.2            HOSPITAL COURSE:    Patient is a 70y.o. year old female with signal past medical history of chronic A. fib not on anticoagulation secondary to bleed COPD hypothyroidism hyperlipidemia chronic pain neuropathy came to emergency room  Complaining of shortness of breath for last 2 weeks and worsening patient normally at 3 L oxygen by nasal cannula for COPD at home Emergency room seen by the ER physician work-up and showed patient on A. fib with rapid ventricular rate patient received 10 mg of IV Cardizem twice but patient still in rapid ventricular rate patient on Cardizem drip cardiology was consulted by the ER physician cardiology recommended patient to be admitted for further evaluation and treatment patient denies any fever chills has not shortness of breath no nausea no vomiting  In the ER blood work done BNP of 1300        Patient still on Cardizem drip heart rate fluctuating        11/24  Patient is resting comfortably in bed and has no complaints. States breathing treatments help. D Dimer 0.61  Elevated WBC count 15.7K     11/28  A. Fib with RVR, followed by cardiology, on Diltiazem 240 mg twice daily. Today patient states she feels sleepy, but has no other complaints. 11/29  Patient wants to go home. She is requesting xanax for anxiety. 11/30  Patient states she feels well and wants to go home. Patient states she will not use Bipap because it makes her feel claustrophobic. Patient also states she does not want steroids anymore because they make her feel nauseous. ECHO results    Left Ventricle: Normal left ventricular systolic function with a visually estimated EF of 50 - 55%. Left ventricle is smaller than normal. Mild septal thickening. Normal wall motion. Mitral Valve: Mild regurgitation. Tricuspid Valve: The estimated RVSP is 47 mmHg.       Patient was seen by the cardiology medication was adjusted    Patient refusing the BiPAP while in the hospital    Patient discharged home in improved condition follow-up with cardiology pulmonologist and PCP in 1 to 2 weeks    Medication reconciliation done time shopping 35 minutes 50% time spent counseling and coordination of care      Signed:   Kiara Plata MD  11/30/2022  12:58 PM [facility] Counseling: I discussed with the patient the risks of risankizumab-rzaa including but not limited to immunosuppression, and serious infections.  The patient understands that monitoring is required including a PPD at baseline and must alert us or the primary physician if symptoms of infection or other concerning signs are noted.

## 2022-11-30 NOTE — PROGRESS NOTES
Pulmonary Progress Note      NAME: Cyril Cummins   :  1950  MRM:  [id number]    Date/Time: 2022  7:33 PM         Subjective:     Patient seen and examined. She is laying comfortably in bed. On 2 L oxygen. She feels better. Small amount of sputum production. She is asking for discharge plans. Past Medical History reviewed and unchanged from Admission History and Physical       Objective:     Physical Exam     Vitals:      Last 24hrs VS reviewed since prior progress note. Most recent are:    Visit Vitals  BP (!) 101/56 (BP 1 Location: Left upper arm, BP Patient Position: At rest;Sitting)   Pulse 71   Temp 97.8 °F (36.6 °C)   Resp 22   Ht 5' 4.02\" (1.626 m)   Wt 61.2 kg (134 lb 14.7 oz)   SpO2 97%   Breastfeeding No   BMI 23.15 kg/m²     SpO2 Readings from Last 6 Encounters:   22 97%   22 99%   08/10/20 93%   20 95%   18 93%   14 97%    O2 Flow Rate (L/min): 2 l/min   No intake or output data in the 24 hours ending 22     Physical Exam:   Constitutional: pt is oriented to person, place, and time. HEENT:   Head: Normocephalic and atraumatic. Eyes: Pupils are equal, round, and reactive to light. EOM are normal.   Cardiovascular: Irregularly irregular, tachycardic  Pulmonary/Chest: Reduced air entry bilaterally with prolonged exhalation, occasional wheezing, basal crackles  Exhibits no tenderness. Abdominal: Soft. Bowel sounds are normal. There is no abdominal tenderness. There is no rebound and no guarding. Musculoskeletal: Normal range of motion. Neurological: pt is alert and oriented to person, place, and time. Alert. Normal strength. No cranial nerve deficit or sensory deficit. XR CHEST PORT   Final Result   Emphysematous but clear lungs. CTA CHEST W OR W WO CONT   Final Result   1. No evidence of pulmonary embolus. 2.  Small right pleural effusion with mild right basilar airspace disease. 3. Centrilobular emphysema.    4. Cardiomegaly.             XR CHEST PORT   Final Result      No acute process on portable chest.             Lab Data Reviewed: (see below)      Medications:  Current Facility-Administered Medications   Medication Dose Route Frequency    metoprolol tartrate (LOPRESSOR) tablet 50 mg  50 mg Oral BID    verapamiL (CALAN) tablet 120 mg  120 mg Oral TID    tiotropium bromide (SPIRIVA RESPIMAT) 2.5 mcg /actuation  2 Puff Inhalation DAILY    albuterol-ipratropium (DUO-NEB) 2.5 MG-0.5 MG/3 ML  3 mL Nebulization Q6H PRN    dilTIAZem ER (CARDIZEM CD) capsule 240 mg  240 mg Oral BID    sodium chloride (OCEAN) 0.65 % nasal squeeze bottle 2 Spray  2 Spray Both Nostrils Q2H PRN    polyethylene glycol (MIRALAX) packet 17 g  17 g Oral DAILY    doxycycline (VIBRAMYCIN) 100 mg in 0.9% sodium chloride (MBP/ADV) 100 mL MBP  100 mg IntraVENous Q12H    apixaban (ELIQUIS) tablet 5 mg  5 mg Oral BID    budesonide-formoteroL (SYMBICORT) 160-4.5 mcg/actuation HFA inhaler 2 Puff  2 Puff Inhalation BID    hydrOXYzine HCL (ATARAX) tablet 25 mg  25 mg Oral TID PRN    montelukast (SINGULAIR) tablet 10 mg  10 mg Oral DAILY    [Held by provider] nortriptyline (PAMELOR) capsule 10 mg  10 mg Oral QHS    pantoprazole (PROTONIX) tablet 40 mg  40 mg Oral DAILY    pravastatin (PRAVACHOL) tablet 40 mg  40 mg Oral DAILY    glucose chewable tablet 16 g  4 Tablet Oral PRN    glucagon (GLUCAGEN) injection 1 mg  1 mg IntraMUSCular PRN    acetaminophen (TYLENOL) tablet 650 mg  650 mg Oral Q6H PRN    Or    acetaminophen (TYLENOL) suppository 650 mg  650 mg Rectal Q6H PRN    polyethylene glycol (MIRALAX) packet 17 g  17 g Oral DAILY PRN    ondansetron (ZOFRAN ODT) tablet 4 mg  4 mg Oral Q8H PRN    Or    ondansetron (ZOFRAN) injection 4 mg  4 mg IntraVENous Q6H PRN    levothyroxine (SYNTHROID) tablet 75 mcg  75 mcg Oral ACB    DULoxetine (CYMBALTA) capsule 60 mg  60 mg Oral DAILY    gabapentin (NEURONTIN) capsule 400 mg  400 mg Oral BID    hydrOXYchloroQUINE (PLAQUENIL) tablet 200 mg  200 mg Oral DAILY WITH BREAKFAST    methylPREDNISolone (PF) (SOLU-MEDROL) injection 40 mg  40 mg IntraVENous Q6H       ______________________________________________________________________      Lab Review:     Recent Labs     11/29/22  0938   WBC 13.9*   HGB 9.6*   HCT 30.3*        Recent Labs     11/29/22  0938   *   K 5.1   CL 94*   CO2 34*   *   BUN 29*   CREA 0.69   CA 8.8   ALB 3.9   *     No components found for: GLPOC  Recent Labs     11/28/22  1003   PH 7.31*   PCO2 59*   PO2 99   HCO3 29*   FIO2 32     No results for input(s): INR, INREXT, INREXT in the last 72 hours. Other pertinent lab:          Assessment & Plan:        1. Acute on chronic respiratory failure with hypoxia and hypercapnia  2. Acute exacerbation of COPD  3. Paroxysmal atrial fibrillation with rapid ventricular response  4. Left-sided pleuritic chest pain  5. Hypertension  6. Depression    Plan:    Continue nasal cannula oxygen as needed to keep saturation above 92%  She is currently on 2 L oxygen which she has at home. She was started on BiPAP 14/8 however she is very intolerant and is refusing it. She is feeling better and is asking for discharge plans. Patient with atrial fibrillation. Has been complaining of headache when she stands up. CT angiogram of chest did not show any pulmonary embolism. Showed cardiomegaly and changes of emphysema. Acute exacerbation of COPD  Continue Solu-Medrol  Continue Rocephin  Added doxycycline for atypical coverage  On Spiriva. Nebulized DuoNebs as needed only. Will decrease IV Solu-Medrol. Cardizem and atenolol.     placed on Lasix 40 mg IV  Check electrolytes and replace as needed  Intake and output charting  Monitor renal function with fluid changes    Continue blood pressure medications  Cymbalta 60 mg daily  Neurontin 400 mg twice a day     DVT and GI prophylaxis     Questions of patient were answered at bedside in detail  Case discussed in detail with RN, RT, and care team  Thank you for involving me in the care of this patient  I will follow with you closely during hospitalization     Time spent more than 30 minutes under patient care with no overlap reviewing results and records, decision making, and answering questions.        Flako Davis MD

## 2022-11-30 NOTE — PROGRESS NOTES
General Daily Progress Note          Patient Name:   Gentry Carpenter       YOB: 1950       Age:  67 y.o. Admit Date: 11/22/2022      Subjective:     Patient is a 70y.o. year old female with signal past medical history of chronic A. fib not on anticoagulation secondary to bleed COPD hypothyroidism hyperlipidemia chronic pain neuropathy came to emergency room  Complaining of shortness of breath for last 2 weeks and worsening patient normally at 3 L oxygen by nasal cannula for COPD at home Emergency room seen by the ER physician work-up and showed patient on A. fib with rapid ventricular rate patient received 10 mg of IV Cardizem twice but patient still in rapid ventricular rate patient on Cardizem drip cardiology was consulted by the ER physician cardiology recommended patient to be admitted for further evaluation and treatment patient denies any fever chills has not shortness of breath no nausea no vomiting  In the ER blood work done BNP of 1300      Patient still on Cardizem drip heart rate fluctuating       11/24  Patient is resting comfortably in bed and has no complaints. States breathing treatments help. D Dimer 0.61  Elevated WBC count 15.7K    11/28  A. Fib with RVR, followed by cardiology, on Diltiazem 240 mg twice daily. Today patient states she feels sleepy, but has no other complaints. 11/29  Patient wants to go home. She is requesting xanax for anxiety. 11/30  Patient states she feels well and wants to go home. Patient states she will not use Bipap because it makes her feel claustrophobic. Patient also states she does not want steroids anymore because they make her feel nauseous. ECHO results    Left Ventricle: Normal left ventricular systolic function with a visually estimated EF of 50 - 55%. Left ventricle is smaller than normal. Mild septal thickening. Normal wall motion. Mitral Valve: Mild regurgitation. Tricuspid Valve:  The estimated RVSP is 47 mmHg.    Objective:     Visit Vitals  BP (!) 110/44 (BP 1 Location: Right upper arm, BP Patient Position: Semi fowlers)   Pulse 90   Temp 98 °F (36.7 °C)   Resp 20   Ht 5' 4.02\" (1.626 m)   Wt 63.8 kg (140 lb 9.6 oz)   SpO2 97%   Breastfeeding No   BMI 24.12 kg/m²        Recent Results (from the past 24 hour(s))   CBC W/O DIFF    Collection Time: 11/29/22  9:38 AM   Result Value Ref Range    WBC 13.9 (H) 3.6 - 11.0 K/uL    RBC 3.20 (L) 3.80 - 5.20 M/uL    HGB 9.6 (L) 11.5 - 16.0 g/dL    HCT 30.3 (L) 35.0 - 47.0 %    MCV 94.7 80.0 - 99.0 FL    MCH 30.0 26.0 - 34.0 PG    MCHC 31.7 30.0 - 36.5 g/dL    RDW 14.0 11.5 - 14.5 %    PLATELET 334 872 - 418 K/uL    MPV 10.6 8.9 - 12.9 FL    NRBC 0.8 (H) 0.0  WBC    ABSOLUTE NRBC 0.11 (H) 0.00 - 2.31 K/uL   METABOLIC PANEL, COMPREHENSIVE    Collection Time: 11/29/22  9:38 AM   Result Value Ref Range    Sodium 135 (L) 136 - 145 mmol/L    Potassium 5.1 3.5 - 5.1 mmol/L    Chloride 94 (L) 97 - 108 mmol/L    CO2 34 (H) 21 - 32 mmol/L    Anion gap 7 5 - 15 mmol/L    Glucose 156 (H) 65 - 100 mg/dL    BUN 29 (H) 6 - 20 mg/dL    Creatinine 0.69 0.55 - 1.02 mg/dL    BUN/Creatinine ratio 42 (H) 12 - 20      eGFR >60 >60 ml/min/1.73m2    Calcium 8.8 8.5 - 10.1 mg/dL    Bilirubin, total 0.6 0.2 - 1.0 mg/dL    AST (SGOT) 50 (H) 15 - 37 U/L    ALT (SGPT) 112 (H) 12 - 78 U/L    Alk. phosphatase 59 45 - 117 U/L    Protein, total 6.6 6.4 - 8.2 g/dL    Albumin 3.9 3.5 - 5.0 g/dL    Globulin 2.7 2.0 - 4.0 g/dL    A-G Ratio 1.4 1.1 - 2.2         [unfilled]      Review of Systems    Constitutional: Negative for chills and fever. HENT: Negative. Eyes: Negative. Respiratory: Negative. Cardiovascular: Negative. Gastrointestinal: Negative for abdominal pain and nausea. Skin: Negative. Neurological: Negative. Physical Exam:      Constitutional: pt is oriented to person, place, and time. HENT:   Head: Normocephalic and atraumatic.    Eyes: Pupils are equal, round, and reactive to light. EOM are normal.   Cardiovascular: Normal rate, regular rhythm and normal heart sounds. Pulmonary/Chest: Breath sounds normal. No wheezes. No rales. Exhibits no tenderness. Abdominal: Soft. Bowel sounds are normal. There is no abdominal tenderness. There is no rebound and no guarding. Musculoskeletal: Normal range of motion. Neurological: pt is alert and oriented to person, place, and time. XR CHEST PORT   Final Result   Emphysematous but clear lungs. CTA CHEST W OR W WO CONT   Final Result   1. No evidence of pulmonary embolus. 2.  Small right pleural effusion with mild right basilar airspace disease. 3. Centrilobular emphysema. 4. Cardiomegaly. XR CHEST PORT   Final Result      No acute process on portable chest.              Recent Results (from the past 24 hour(s))   CBC W/O DIFF    Collection Time: 11/29/22  9:38 AM   Result Value Ref Range    WBC 13.9 (H) 3.6 - 11.0 K/uL    RBC 3.20 (L) 3.80 - 5.20 M/uL    HGB 9.6 (L) 11.5 - 16.0 g/dL    HCT 30.3 (L) 35.0 - 47.0 %    MCV 94.7 80.0 - 99.0 FL    MCH 30.0 26.0 - 34.0 PG    MCHC 31.7 30.0 - 36.5 g/dL    RDW 14.0 11.5 - 14.5 %    PLATELET 666 148 - 078 K/uL    MPV 10.6 8.9 - 12.9 FL    NRBC 0.8 (H) 0.0  WBC    ABSOLUTE NRBC 0.11 (H) 0.00 - 7.74 K/uL   METABOLIC PANEL, COMPREHENSIVE    Collection Time: 11/29/22  9:38 AM   Result Value Ref Range    Sodium 135 (L) 136 - 145 mmol/L    Potassium 5.1 3.5 - 5.1 mmol/L    Chloride 94 (L) 97 - 108 mmol/L    CO2 34 (H) 21 - 32 mmol/L    Anion gap 7 5 - 15 mmol/L    Glucose 156 (H) 65 - 100 mg/dL    BUN 29 (H) 6 - 20 mg/dL    Creatinine 0.69 0.55 - 1.02 mg/dL    BUN/Creatinine ratio 42 (H) 12 - 20      eGFR >60 >60 ml/min/1.73m2    Calcium 8.8 8.5 - 10.1 mg/dL    Bilirubin, total 0.6 0.2 - 1.0 mg/dL    AST (SGOT) 50 (H) 15 - 37 U/L    ALT (SGPT) 112 (H) 12 - 78 U/L    Alk.  phosphatase 59 45 - 117 U/L    Protein, total 6.6 6.4 - 8.2 g/dL    Albumin 3.9 3.5 - 5.0 g/dL    Globulin 2.7 2.0 - 4.0 g/dL    A-G Ratio 1.4 1.1 - 2.2           Results       Procedure Component Value Units Date/Time    CULTURE, URINE [[id number]] Collected: 11/23/22 1555    Order Status: Completed Specimen: Urine Updated: 11/25/22 1236     Special Requests: No Special Requests        Culture result: No Growth (<1000 cfu/mL)       CULTURE, BLOOD, PAIRED [[id number]]     Order Status: Canceled Specimen: Blood     COVID-19 RAPID TEST [[id number]]     Order Status: Canceled     INFLUENZA A & B AG (RAPID TEST) [[id number]]     Order Status: Canceled Specimen: Nasopharyngeal              Labs:     Recent Labs     11/29/22  0938   WBC 13.9*   HGB 9.6*   HCT 30.3*          Recent Labs     11/29/22  0938   *   K 5.1   CL 94*   CO2 34*   BUN 29*   CREA 0.69   *   CA 8.8       Recent Labs     11/29/22  0938   *   AP 59   TBILI 0.6   TP 6.6   ALB 3.9   GLOB 2.7       No results for input(s): INR, PTP, APTT, INREXT, INREXT in the last 72 hours. No results for input(s): FE, TIBC, PSAT, FERR in the last 72 hours. Lab Results   Component Value Date/Time    Folate 12.3 07/12/2018 08:10 AM        Recent Labs     11/28/22  1003   PH 7.31*   PCO2 59*   PO2 99       No results for input(s): CPK, CKNDX, TROIQ in the last 72 hours.     No lab exists for component: CPKMB  No results found for: CHOL, CHOLX, CHLST, CHOLV, HDL, HDLP, LDL, LDLC, DLDLP, TGLX, TRIGL, TRIGP, CHHD, CHHDX  No results found for: [facility]  [facility]  Lab Results   Component Value Date/Time    Color Yellow/Straw 11/22/2022 04:12 PM    Appearance Clear 11/22/2022 04:12 PM    Specific gravity 1.005 11/22/2022 04:12 PM    pH (UA) 7.0 11/22/2022 04:12 PM    Protein Negative 11/22/2022 04:12 PM    Glucose Negative 11/22/2022 04:12 PM    Ketone Negative 11/22/2022 04:12 PM    Bilirubin Negative 11/22/2022 04:12 PM    Urobilinogen 0.1 11/22/2022 04:12 PM    Nitrites Negative 11/22/2022 04:12 PM    Leukocyte Esterase Moderate (A) 11/22/2022 04:12 PM    Bacteria Negative 11/22/2022 04:12 PM    Bacteria Negative 11/22/2022 04:12 PM    WBC 20-50 11/22/2022 04:12 PM    WBC 20-50 11/22/2022 04:12 PM    RBC 0-5 11/22/2022 04:12 PM    RBC 0-5 11/22/2022 04:12 PM         Assessment:     CARLOS payan with rapid ventricular rate  COPD  Chronic hypoxic respiratory failure on 3 L  Hypertension  Hypothyroidism  Depression  Neuropathy  UTI  Leukocytosis with steroids      Plan:     Cardizem 240 mg twice a day  COPD follow-up with pulmonologist  Continue other home medications  Continue Lasix 40 mg  Continue Solumedrol, nebulizers as needed  Continue doxycycline   Metoprolol 25 mg    Continue to monitor heart rate    Patient seen by the pulmonologist and plan for put on BiPAP but patient refused    Patient followed by cardiology. She is maxed out on long-acting diltiazem. Per cardiology, cannot increase metoprolol dose as it will make her COPD worse. Patient is reluctant to use amiodarone due to her advanced COPD. And elevated LFT She will be high risk AV node ablation with permanent pacemaker as she is not likely to tolerate sedation due to her advanced COPD.      Advance BB for better HR control  Try verapamil with diltiazem  Consider adding digoxin if rate control remains problematic  Pacemaker and AV junction ablation seems the best option long term            Current Facility-Administered Medications:     metoprolol tartrate (LOPRESSOR) tablet 50 mg, 50 mg, Oral, BID, Howie Chiang MD, 50 mg at 11/29/22 1616    verapamiL (CALAN) tablet 120 mg, 120 mg, Oral, TID, Howie Chiang MD, 120 mg at 11/29/22 2122    methylPREDNISolone (PF) (SOLU-MEDROL) injection 40 mg, 40 mg, IntraVENous, Q8H, Beryl Nair MD, 40 mg at 11/30/22 0547    tiotropium bromide (SPIRIVA RESPIMAT) 2.5 mcg Miladys Mckinney, 2 Puff, Inhalation, DAILY, Maria R Mora MD, 2 Puff at 11/29/22 0738    albuterol-ipratropium (DUO-NEB) 2.5 MG-0.5 MG/3 ML, 3 mL, Nebulization, Q6H PRN, Golden Kent MD, 3 mL at 11/28/22 1319    dilTIAZem ER (CARDIZEM CD) capsule 240 mg, 240 mg, Oral, BID, Marco Shelby MD, 240 mg at 11/29/22 2123    sodium chloride (OCEAN) 0.65 % nasal squeeze bottle 2 Spray, 2 Spray, Both Nostrils, Q2H PRN, Karen Willams MD, 2 Spray at 11/29/22 1121    polyethylene glycol (MIRALAX) packet 17 g, 17 g, Oral, DAILY, Asim Flores MD, 17 g at 11/25/22 0902    doxycycline (VIBRAMYCIN) 100 mg in 0.9% sodium chloride (MBP/ADV) 100 mL MBP, 100 mg, IntraVENous, Q12H, Tyrone Olivo MD, Last Rate: 100 mL/hr at 11/29/22 2328, 100 mg at 11/29/22 2328    apixaban (ELIQUIS) tablet 5 mg, 5 mg, Oral, BID, Macario Mott MD, 5 mg at 11/29/22 2123    budesonide-formoteroL (SYMBICORT) 160-4.5 mcg/actuation HFA inhaler 2 Puff, 2 Puff, Inhalation, BID, Asim Flores MD, 2 Puff at 11/29/22 2023    hydrOXYzine HCL (ATARAX) tablet 25 mg, 25 mg, Oral, TID PRN, Asim Flores MD, 25 mg at 11/29/22 2122    montelukast (SINGULAIR) tablet 10 mg, 10 mg, Oral, DAILY, Asim Flores MD, 10 mg at 11/29/22 0919    [Held by provider] nortriptyline (PAMELOR) capsule 10 mg, 10 mg, Oral, QHS, Asim Flores MD    pantoprazole (PROTONIX) tablet 40 mg, 40 mg, Oral, DAILY, Asim Flores MD, 40 mg at 11/29/22 0919    pravastatin (PRAVACHOL) tablet 40 mg, 40 mg, Oral, DAILY, Asim Flores MD, 40 mg at 11/29/22 0919    glucose chewable tablet 16 g, 4 Tablet, Oral, PRN, Irene Flores MD    glucagon (GLUCAGEN) injection 1 mg, 1 mg, IntraMUSCular, PRN, Asim Flores MD    acetaminophen (TYLENOL) tablet 650 mg, 650 mg, Oral, Q6H PRN, 650 mg at 11/29/22 0224 **OR** acetaminophen (TYLENOL) suppository 650 mg, 650 mg, Rectal, Q6H PRN, Asim Flores MD    polyethylene glycol (MIRALAX) packet 17 g, 17 g, Oral, DAILY PRN, Asim Flores MD, 17 g at 11/24/22 1636    ondansetron (ZOFRAN ODT) tablet 4 mg, 4 mg, Oral, Q8H PRN, 4 mg at 11/29/22 1844 **OR** ondansetron (ZOFRAN) injection 4 mg, 4 mg, IntraVENous, Q6H PRN, Asim Flores MD    levothyroxine (SYNTHROID) tablet 75 mcg, 75 mcg, Oral, ACB, Asim Flores MD, 75 mcg at 11/28/22 0844    DULoxetine (CYMBALTA) capsule 60 mg, 60 mg, Oral, DAILY, Asim Flores MD, 60 mg at 11/29/22 0919    gabapentin (NEURONTIN) capsule 400 mg, 400 mg, Oral, BID, Asim Flores MD, 400 mg at 11/29/22 8897    hydrOXYchloroQUINE (PLAQUENIL) tablet 200 mg, 200 mg, Oral, DAILY WITH BREAKFAST, Asim Flores MD, 200 mg at 11/29/22 6011

## 2022-12-21 NOTE — ED PROVIDER NOTES
EMERGENCY DEPARTMENT HISTORY AND PHYSICAL EXAM      Date: 12/21/2022  Patient Name: Clemencia Cooney    History of Presenting Illness     Chief Complaint   Patient presents with    Irregular Heart Beat       History Provided By: Patient and EMS    HPI: Clemencia Cooney, 67 y.o. female with a past medical history significant COPD presents to the ED with chief complaint of Irregular Heart Beat  . 59-year-old female history of atrial fibrillation presents with shortness of breath palpitations irregular heartbeat and fast heartbeat ongoing for 3 days. EMS found her to be in SVT given adenosine showed that she was in A. fib. Tried amiodarone. And blood pressure heart rate did appear improved. Upon arrival to the ER she says she feels slightly better but still very short of breath. History of COPD as well. Similar admit in the past.  Follows with cardiology Dr. Agata Rowe. Compliant on her medications. There are no other complaints, changes, or physical findings at this time. PCP: John Banks DO    Current Facility-Administered Medications   Medication Dose Route Frequency Provider Last Rate Last Admin    dilTIAZem (CARDIZEM) 125 mg in 0.9% sodium chloride 125 mL ([id number])  5 mg/hr IntraVENous TITRATE Mary Browne MD 5 mL/hr at 12/21/22 1421 5 mg/hr at 12/21/22 1421    methylPREDNISolone (PF) (Solu-MEDROL) injection 125 mg  125 mg IntraVENous NOW Erin Moscoso MD         Current Outpatient Medications   Medication Sig Dispense Refill    dilTIAZem ER (CARDIZEM CD) 240 mg capsule Take 1 Capsule by mouth two (2) times a day. 30 Capsule 2    levothyroxine (SYNTHROID) 75 mcg tablet Take 1 Tablet by mouth Daily (before breakfast). 60 Tablet 0    apixaban (ELIQUIS) 5 mg tablet Take 1 Tablet by mouth two (2) times a day. 60 Tablet 0    DULoxetine (CYMBALTA) 60 mg capsule Take 1 Capsule by mouth daily.  60 Capsule 0    hydrOXYchloroQUINE (PLAQUENIL) 200 mg tablet Take 1 Tablet by mouth daily (with breakfast). 30 Tablet 0    metoprolol tartrate (LOPRESSOR) 50 mg tablet Take 1 Tablet by mouth two (2) times a day. 60 Tablet 0    tiotropium bromide (SPIRIVA RESPIMAT) 2.5 mcg/actuation inhaler Take 2 Puffs by inhalation daily. 4 g 1    verapamiL (CALAN) 120 mg tablet Take 1 Tablet by mouth three (3) times daily. 30 Tablet 0    doxycycline (MONODOX) 100 mg capsule Take 1 Capsule by mouth two (2) times a day. 20 Capsule 0    predniSONE (DELTASONE) 10 mg tablet 2 tablet daily 20 Tablet 0    gabapentin (NEURONTIN) 600 mg tablet Take 600 mg by mouth three (3) times daily. hydrOXYzine HCL (ATARAX) 25 mg tablet Take 25 mg by mouth three (3) times daily as needed. pantoprazole (PROTONIX) 40 mg tablet Take 40 mg by mouth daily. pravastatin (PRAVACHOL) 40 mg tablet Take 40 mg by mouth daily. 0    montelukast (SINGULAIR) 10 mg tablet Take 10 mg by mouth daily. fluticasone-salmeterol (ADVAIR DISKUS) 250-50 mcg/Dose diskus inhaler Take 1 Puff by inhalation every twelve (12) hours. IPRATROPIUM/ALBUTEROL SULFATE (COMBIVENT IN) Take 14.7 g by inhalation four (4) times daily. calcium-vitamin D (OYSTER SHELL) 500 mg(1,250mg) -200 unit per tablet Take 1 Tab by mouth two (2) times daily (with meals).          Past History     Past Medical History:  Past Medical History:   Diagnosis Date    Anxiety     Arrhythmia     svt    Calculus of kidney     Chronic atrial fibrillation ([facility] [address]     COPD     emphysema    Depression     GERD (gastroesophageal reflux disease)     Hypothyroidism     Sciatica     Thyroid disease        Past Surgical History:  Past Surgical History:   Procedure Laterality Date    HX BREAST BIOPSY Right 2010    HX GYN  11/05    LEEP  for CIN2    HX ORTHOPAEDIC      left leg    [place] BREAST SURGERY PROCEDURE UNLISTED  2007    biopsy        Family History:  Family History   Problem Relation Age of Onset    Malignant Hyperthermia Neg Hx     Pseudocholinesterase Deficiency Neg Hx Delayed Awakening Neg Hx     Post-op Nausea/Vomiting Neg Hx     Emergence Delirium Neg Hx     Post-op Cognitive Dysfunction Neg Hx     Other Neg Hx        Social History:  Social History     Tobacco Use    Smoking status: Former     Types: Cigarettes     Quit date:      Years since quittin.9    Smokeless tobacco: Never   Substance Use Topics    Alcohol use: No    Drug use: No       Allergies: Allergies   Allergen Reactions    Nsaids (Non-Steroidal Anti-Inflammatory Drug) Unknown (comments)    Sulfa (Sulfonamide Antibiotics) Rash         Review of Systems   Review of Systems   Constitutional: Negative. Negative for chills, fatigue and fever. HENT: Negative. Negative for congestion, nosebleeds and sore throat. Eyes: Negative. Negative for pain, discharge and visual disturbance. Respiratory:  Positive for shortness of breath. Negative for cough and chest tightness. Cardiovascular:  Positive for palpitations. Negative for chest pain and leg swelling. Gastrointestinal:  Negative for abdominal pain, blood in stool, constipation, diarrhea, nausea and vomiting. Endocrine: Negative. Genitourinary: Negative. Negative for difficulty urinating, dysuria, pelvic pain and vaginal bleeding. Musculoskeletal: Negative. Negative for arthralgias, back pain and myalgias. Skin: Negative. Negative for rash and wound. Allergic/Immunologic: Negative. Neurological: Negative. Negative for dizziness, syncope, weakness, numbness and headaches. Hematological: Negative. Psychiatric/Behavioral: Negative. Negative for agitation, confusion and suicidal ideas. All other systems reviewed and are negative. Physical Exam   Patient Vitals for the past 24 hrs:   Temp Pulse Resp BP SpO2   22 1428 97.6 °F (36.4 °C) -- -- -- --   22 1422 -- (!) 133 30 (!) 126/94 --   22 1354 -- (!) 146 22 (!) 68/49 100 %         Physical Exam  Vitals and nursing note reviewed.  Exam conducted with a chaperone present. Constitutional:       Appearance: Normal appearance. She is normal weight. She is ill-appearing. HENT:      Head: Normocephalic and atraumatic. Nose: Nose normal.      Mouth/Throat:      Mouth: Mucous membranes are moist.      Pharynx: Oropharynx is clear. Eyes:      Extraocular Movements: Extraocular movements intact. Conjunctiva/sclera: Conjunctivae normal.      Pupils: Pupils are equal, round, and reactive to light. Cardiovascular:      Rate and Rhythm: Tachycardia present. Rhythm irregular. Pulses: Normal pulses. Heart sounds: Normal heart sounds. Pulmonary:      Effort: Respiratory distress present. Breath sounds: Normal breath sounds. Abdominal:      General: Abdomen is flat. Bowel sounds are normal. There is no distension. Palpations: Abdomen is soft. Tenderness: There is no abdominal tenderness. There is no guarding. Musculoskeletal:         General: No swelling, tenderness, deformity or signs of injury. Normal range of motion. Cervical back: Normal range of motion and neck supple. Right lower leg: No edema. Left lower leg: No edema. Skin:     General: Skin is warm and dry. Capillary Refill: Capillary refill takes less than 2 seconds. Findings: No lesion or rash. Neurological:      General: No focal deficit present. Mental Status: She is alert and oriented to person, place, and time. Mental status is at baseline. Cranial Nerves: No cranial nerve deficit. Psychiatric:         Mood and Affect: Mood normal.         Behavior: Behavior normal.         Thought Content: Thought content normal.         Judgment: Judgment normal.       Diagnostic Study Results     Labs -No results found for this or any previous visit (from the past 24 hour(s)).           Radiologic Studies -   XR CHEST PORT    (Results Pending)   DUPLEX LOWER EXT VENOUS BILAT    (Results Pending)     CT Results  (Last 48 hours)      None CXR Results  (Last 48 hours)      None            Medical Decision Making and ED Course   I am the first provider for this patient. I reviewed the vital signs, available nursing notes, past medical history, past surgical history, family history and social history. Vital Signs-Reviewed the patient's vital signs. Patient Vitals for the past 24 hrs:   Temp Pulse Resp BP SpO2   12/21/22 1428 97.6 °F (36.4 °C) -- -- -- --   12/21/22 1422 -- (!) 133 30 (!) 126/94 --   12/21/22 1354 -- (!) 146 22 (!) 68/49 100 %         EKG interpretation:         Records Reviewed: Previous Hospital chart. EMS run report      ED Course:   Initial assessment performed. The patients presenting problems have been discussed, and they are in agreement with the care plan formulated and outlined with them. I have encouraged them to ask questions as they arise throughout their visit. Orders Placed This Encounter    CULTURE, BLOOD, PAIRED     Standing Status:   Standing     Number of Occurrences:   1    CULTURE, BLOOD     Standing Status:   Standing     Number of Occurrences:   1    XR CHEST PORT     Standing Status:   Standing     Number of Occurrences:   1     Order Specific Question:   Reason for Exam     Answer:   sob    CBC WITH AUTOMATED DIFF     Standing Status:   Standing     Number of Occurrences:   1    COMPREHENSIVE METABOLIC PANEL     Standing Status:   Standing     Number of Occurrences:   1    PRO-BNP     Standing Status:   Standing     Number of Occurrences:   1    TROPONIN-HIGH SENSITIVITY     Standing Status:   Standing     Number of Occurrences:   1    MAGNESIUM     Standing Status:   Standing     Number of Occurrences:   1    LACTIC ACID, PLASMA     If lactic acid level is greater than 2, then a repeat lactic acid level is to be drawn in 6 hours.      Standing Status:   Standing     Number of Occurrences:   1    LACTIC ACID, PLASMA     If initial lactic acid level is greater than 2, then a repeat lactic acid level is to be drawn in 6 hours. Standing Status:   Standing     Number of Occurrences:   [id number]    URINALYSIS W/ RFLX MICROSCOPIC     Standing Status:   Standing     Number of Occurrences:   1    PROCALCITONIN     Standing Status:   Standing     Number of Occurrences:   1    VITAL SIGNS     Standing Status:   Standing     Number of Occurrences:   1    EKG, 12 LEAD, INITIAL     Standing Status:   Standing     Number of Occurrences:   1     Order Specific Question:   Reason for Exam:     Answer:   sob    SALINE LOCK IV ONE TIME STAT     Standing Status:   Standing     Number of Occurrences:   1    dilTIAZem (CARDIZEM) injection 10 mg    dilTIAZem (CARDIZEM) 125 mg in 0.9% sodium chloride 125 mL ([id number])     Order Specific Question:   Titrate Infusion? Answer:   Yes     Order Specific Question:   Initial Infusion Rate: Answer:   2.5 mg/hr     Order Specific Question:   Titrate Every 30 Minutes in Increments of     Answer:   5 mg/hr     Order Specific Question:   Goal of Therapy is: Answer:   HR less than 100 bpm     Order Specific Question:   Contact Physician for     Answer:   SBP less than 90 mmHg     Order Specific Question:   HOLD for     Answer:   HR less than 60 bpm    methylPREDNISolone (PF) (Solu-MEDROL) injection 125 mg                 Provider Notes (Medical Decision Making):   12-year-old female history of atrial fibrillation presents with shortness of breath palpitations irregular heartbeat and fast heartbeat ongoing for 3 days. EMS found her to be in SVT given adenosine showed that she was in A. fib. Tried amiodarone. And blood pressure heart rate did appear improved. Upon arrival to the ER she says she feels slightly better but still very short of breath. History of COPD as well. ED Course as of 12/21/22 1444   Wed Dec 21, 2022   1407 100. Irregular irregular intervals. Tachycardia rate of 156 atrial fibrillation. PVC. No ST changes. Reason rule out dysrhythmia.   Interpreted by ER physician. [HP]      ED Course User Index  [HP] Arina TAYLOR MD       Diltiazem bolus and drip. Consults              Admitted    Procedures         CRITICAL CARE NOTE :  2:45 PM  Amount of Critical Care Time: 45 minutes    IMPENDING DETERIORATION -Airway, Respiratory, Cardiovascular, and CNS  ASSOCIATED RISK FACTORS - Hypotension  MANAGEMENT- Bedside Assessment and Supervision of Care  INTERPRETATION -  Xrays  INTERVENTIONS - hemodynamic mngmt  CASE REVIEW - Medical Sub-Specialist  TREATMENT RESPONSE -Improved  PERFORMED BY - Self    NOTES   :  I have spent critical care time involved in lab review, consultations with specialist, family decision- making, bedside attention and documentation. This time excludes time spent in any separate billed procedures. During this entire length of time I was immediately available to the patient . Ofelia Irvin MD          Disposition       Emergency Department Disposition:  Admitted      Diagnosis     Clinical Impression:   1. Atrial fibrillation with rapid ventricular response (HCC)        Attestations:    Ofelia Irvin MD    Please note that this dictation was completed with [employer], the computer voice recognition software. Quite often unanticipated grammatical, syntax, homophones, and other interpretive errors are inadvertently transcribed by the computer software. Please disregard these errors. Please excuse any errors that have escaped final proofreading. Thank you.

## 2022-12-21 NOTE — H&P
History and Physical    Patient: Tiffanie Landa MRN: [id number]  SSN: [id number]    YOB: 1950  Age: 67 y.o. Sex: female      Subjective:      Tiffanie Landa is a 67 y.o. female with PMH of atrial fibrillation, SVT, COPD on home 3L NC and other medical problem as below. She presented to the ED with chief complaint of generalized weakness, shortness of breath, palpitation. She has been feeling unwell for the past 2 weeks, progressively worsened. Also associated with wheezing, however denies cough, fever or chills. He also admits to have intermittent chest pain for the past 2 weeks, however denies chest pain currently. Also associated with dizziness, headache and loss of appetite. In the ED, found to be hypoxic requiring 6 L NC. Also noted to be in A. fib, RVR , started on Cardizem drip. Afebrile, no leukocytosis. Chest X-ray showed new small left pleural effusion and emphysema. No consolidation.      Past Medical History:   Diagnosis Date    Anxiety     Arrhythmia     svt    Calculus of kidney     Chronic atrial fibrillation (HCC)     COPD     emphysema    Depression     GERD (gastroesophageal reflux disease)     Hypothyroidism     Sciatica     Thyroid disease      Past Surgical History:   Procedure Laterality Date    HX BREAST BIOPSY Right     HX GYN      LEEP  for CIN2    HX ORTHOPAEDIC      left leg    KY BREAST SURGERY PROCEDURE UNLISTED      biopsy       Family History   Problem Relation Age of Onset    Malignant Hyperthermia Neg Hx     Pseudocholinesterase Deficiency Neg Hx     Delayed Awakening Neg Hx     Post-op Nausea/Vomiting Neg Hx     Emergence Delirium Neg Hx     Post-op Cognitive Dysfunction Neg Hx     Other Neg Hx      Social History     Tobacco Use    Smoking status: Former     Types: Cigarettes     Quit date:      Years since quittin.9    Smokeless tobacco: Never   Substance Use Topics    Alcohol use: No      Prior to Admission medications Medication Sig Start Date End Date Taking? Authorizing Provider   dilTIAZem ER (CARDIZEM CD) 240 mg capsule Take 1 Capsule by mouth two (2) times a day. 11/30/22   Venessa Flores MD   levothyroxine (SYNTHROID) 75 mcg tablet Take 1 Tablet by mouth Daily (before breakfast). 12/1/22   Venessa Flores MD   apixaban (ELIQUIS) 5 mg tablet Take 1 Tablet by mouth two (2) times a day. 11/30/22   Venessa Flores MD   DULoxetine (CYMBALTA) 60 mg capsule Take 1 Capsule by mouth daily. 12/1/22   Venessa Flores MD   hydrOXYchloroQUINE (PLAQUENIL) 200 mg tablet Take 1 Tablet by mouth daily (with breakfast). 12/1/22   Venessa Flores MD   metoprolol tartrate (LOPRESSOR) 50 mg tablet Take 1 Tablet by mouth two (2) times a day. 11/30/22   Venessa Flores MD   tiotropium bromide (SPIRIVA RESPIMAT) 2.5 mcg/actuation inhaler Take 2 Puffs by inhalation daily. 12/1/22   Venessa Flores MD   verapamiL (CALAN) 120 mg tablet Take 1 Tablet by mouth three (3) times daily. 11/30/22   Venessa Flores MD   doxycycline (MONODOX) 100 mg capsule Take 1 Capsule by mouth two (2) times a day. 11/30/22   Venessa Flores MD   predniSONE (DELTASONE) 10 mg tablet 2 tablet daily 11/30/22   Venessa Flores MD   gabapentin (NEURONTIN) 600 mg tablet Take 600 mg by mouth three (3) times daily. Provider, Historical   hydrOXYzine HCL (ATARAX) 25 mg tablet Take 25 mg by mouth three (3) times daily as needed. Provider, Historical   pantoprazole (PROTONIX) 40 mg tablet Take 40 mg by mouth daily. Provider, Historical   pravastatin (PRAVACHOL) 40 mg tablet Take 40 mg by mouth daily. 7/2/18   Provider, Historical   montelukast (SINGULAIR) 10 mg tablet Take 10 mg by mouth daily. Provider, Historical   fluticasone-salmeterol (ADVAIR DISKUS) 250-50 mcg/Dose diskus inhaler Take 1 Puff by inhalation every twelve (12) hours.     Provider, Historical   IPRATROPIUM/ALBUTEROL SULFATE (COMBIVENT IN) Take 14.7 g by inhalation four (4) times daily. 10/22/10   Provider, Historical   calcium-vitamin D (OYSTER SHELL) 500 mg(1,250mg) -200 unit per tablet Take 1 Tab by mouth two (2) times daily (with meals). Provider, Historical        Allergies   Allergen Reactions    Nsaids (Non-Steroidal Anti-Inflammatory Drug) Unknown (comments)    Sulfa (Sulfonamide Antibiotics) Rash       Review of Systems:   Constitutional: No fevers, No chills, No fatigue, No weakness  Eyes: No visual disturbance  Ears, Nose, Mouth, Throat, and Face: No nasal congestion, No sore throat  Respiratory: See HPI  Cardiovascular: Positive chest pain, positive lower extremity edema, positive palpitations   Gastrointestinal: No nausea, No vomiting, No diarrhea, No constipation, No abdominal pain  Genitourinary: No frequency, No dysuria, No hematuria  Integument/Breast: No rash, No skin lesion(s), No dryness  Musculoskeletal: No arthralgias, No neck pain, No back pain  Neurological: No headaches, No dizziness, No confusion,  No seizures  Behavioral/Psychiatric: No anxiety, No depression      Objective:     Vitals:    12/21/22 1354 12/21/22 1422 12/21/22 1428 12/21/22 1500   BP: (!) 68/49 (!) 126/94  137/84   Pulse: (!) 146 (!) 133  (!) 139   Resp: 22 30  29   Temp:   97.6 °F (36.4 °C)    SpO2: 100%   100%   Weight: 63.8 kg (140 lb 10.5 oz)      Height: 5' 4\" (1.626 m)           Physical Exam:   General: alert, cooperative, no distress  Eye: conjunctivae/corneas clear. PERRL, EOM's intact. Throat and Neck: normal and no erythema or exudates noted. No mass   Lung: Decreased lung sounds on  auscultation bilaterally  Heart: regular rate and rhythm,   Abdomen: soft, non-tender. Bowel sounds normal. No masses,  Extremities: Nonpitting edema able to move all extremities normal, atraumatic  Skin: Normal.  Neurologic: AOx3. Motor function and sensation grossly intact.   Psychiatric: non focal    Recent Results (from the past 24 hour(s))   CBC WITH AUTOMATED DIFF    Collection Time: 12/21/22  2:18 PM   Result Value Ref Range    WBC 6.2 3.6 - 11.0 K/uL    RBC 3.01 (L) 3.80 - 5.20 M/uL    HGB 8.7 (L) 11.5 - 16.0 g/dL    HCT 29.1 (L) 35.0 - 47.0 %    MCV 96.7 80.0 - 99.0 FL    MCH 28.9 26.0 - 34.0 PG    MCHC 29.9 (L) 30.0 - 36.5 g/dL    RDW 14.7 (H) 11.5 - 14.5 %    PLATELET 207 (H) 098 - 400 K/uL    MPV 9.6 8.9 - 12.9 FL    NRBC 0.0 0.0  WBC    ABSOLUTE NRBC 0.00 0.00 - 0.01 K/uL    NEUTROPHILS 75 32 - 75 %    LYMPHOCYTES 13 12 - 49 %    MONOCYTES 10 5 - 13 %    EOSINOPHILS 0 0 - 7 %    BASOPHILS 1 0 - 1 %    IMMATURE GRANULOCYTES 1 (H) 0 - 0.5 %    ABS. NEUTROPHILS 4.7 1.8 - 8.0 K/UL    ABS. LYMPHOCYTES 0.8 0.8 - 3.5 K/UL    ABS. MONOCYTES 0.6 0.0 - 1.0 K/UL    ABS. EOSINOPHILS 0.0 0.0 - 0.4 K/UL    ABS. BASOPHILS 0.0 0.0 - 0.1 K/UL    ABS. IMM. GRANS. 0.0 0.00 - 0.04 K/UL    DF AUTOMATED     METABOLIC PANEL, COMPREHENSIVE    Collection Time: 12/21/22  2:18 PM   Result Value Ref Range    Sodium 131 (L) 136 - 145 mmol/L    Potassium Hemolysed specimen 3.5 - 5.1 mmol/L    Chloride 89 (L) 97 - 108 mmol/L    CO2 37 (H) 21 - 32 mmol/L    Anion gap 5 5 - 15 mmol/L    Glucose 213 (H) 65 - 100 mg/dL    BUN 15 6 - 20 mg/dL    Creatinine 1.18 (H) 0.55 - 1.02 mg/dL    BUN/Creatinine ratio 13 12 - 20      eGFR 49 (L) >60 ml/min/1.73m2    Calcium 9.5 8.5 - 10.1 mg/dL    Bilirubin, total 0.3 0.2 - 1.0 mg/dL    AST (SGOT) Hemolysed specimen 15 - 37 U/L    ALT (SGPT) 30 12 - 78 U/L    Alk.  phosphatase 79 45 - 117 U/L    Protein, total 6.6 6.4 - 8.2 g/dL    Albumin 3.5 3.5 - 5.0 g/dL    Globulin 3.1 2.0 - 4.0 g/dL    A-G Ratio 1.1 1.1 - 2.2     NT-PRO BNP    Collection Time: 12/21/22  2:18 PM   Result Value Ref Range    NT pro-BNP 4,550 (H) <125 pg/mL   TROPONIN-HIGH SENSITIVITY    Collection Time: 12/21/22  2:18 PM   Result Value Ref Range    Troponin-High Sensitivity 43 0 - 51 ng/L   MAGNESIUM    Collection Time: 12/21/22  2:18 PM   Result Value Ref Range    Magnesium Hemolysed specimen 1.6 - 2.4 mg/dL   PROCALCITONIN Collection Time: 12/21/22  2:18 PM   Result Value Ref Range    Procalcitonin <0.05 (H) 0 ng/mL       XR Results (maximum last 3): Results from Hospital Encounter encounter on 12/21/22    XR CHEST PORT    Narrative  EXAM:  XR CHEST PORT    INDICATION: Shortness of breath and dysrhythmia. Emphysema. COMPARISON: CT chest on 11/23/2022. Chest views on 11/24/2022 and 11/22/2022. TECHNIQUE: Semiupright portable chest AP view    FINDINGS: Cardiac monitoring wires are visible. Mild cardiomegaly is unchanged. Aortic arch is atherosclerotic but not enlarged. The pulmonary vasculature is  within normal limits. Left pleural effusion is new and small. Emphysema is unchanged. No visible  pneumonia. Bones are osteopenic. Impression  New small left pleural effusion. Emphysema. Recommend PA and lateral chest views  when the patient can better tolerate. Results from [place] encounter on 11/22/22    XR CHEST PORT    Narrative  PORTABLE CHEST RADIOGRAPH/S: 11/24/2022 3:53 AM    INDICATION: Respiratory failure. COMPARISON: 11/22/2022, CT chest 11/23/2022. TECHNIQUE: Portable frontal semiupright radiograph/s of the chest.    FINDINGS:  The lungs are emphysematous, but clear. The central airways are patent. No  pneumothorax or pleural effusion. Impression  Emphysematous but clear lungs. XR CHEST PORT    Narrative  EXAM:  XR CHEST PORT    INDICATION: Shortness of breath    COMPARISON: none    TECHNIQUE: portable chest AP view    FINDINGS: The cardiac silhouette is within normal limits. Emphysematous changes  are noted. Central pulmonary arteries are enlarged. There is no focal airspace disease. The visualized bones and upper abdomen are  age-appropriate. Impression  No acute process on portable chest.      CT Results (maximum last 3):   Results from [place] encounter on 11/22/22    CTA CHEST W OR W WO CONT    Narrative  INDICATION:   Left pleuritic chest pain with hypoxia    COMPARISON:  None    TECHNIQUE:  Following the uneventful intravenous administration of 100 cc Isovue  096, thin helical axial images were obtained through the chest. Postprocessing  was performed. 3D image postprocessing was performed. CT dose reduction was achieved through the use of a standardized protocol  tailored for this examination and automatic exposure control for dose  modulation. FINDINGS:    There are no enlarged mediastinal or hilar lymph nodes. The heart is enlarged. There is no pericardial effusion. No filling defect is seen within the pulmonary arterial system to suggest  pulmonary embolus. The aorta is normal in caliber. There is centrilobular emphysema. There is a small right pleural effusion. There  is mild patchy airspace disease at the right lung base. No pulmonary nodule or  mass is seen. There are no pleural effusions. Limited evaluation of the upper abdomen demonstrates no abnormality. The osseous  structures are unremarkable. Impression  1. No evidence of pulmonary embolus. 2.  Small right pleural effusion with mild right basilar airspace disease. 3. Centrilobular emphysema. 4. Cardiomegaly. MRI Results (maximum last 3): Results from [place] encounter on 07/23/18    MRI BRAIN WO CONT    Narrative  EXAM:  MRI BRAIN WO CONT      HISTORY: Left arm and left leg numbness  INDICATION:  Left arm and left leg numbness    COMPARISON: None. CONTRAST:  None. TECHNIQUE: Sagittal T1, axial FLAIR, T2,T1 and gradient echo images as well as  coronal T2 weighted images and axial diffusion weighted images of the head were  obtained. FINDINGS: No Chiari or syrinx. Pituitary and infundibulum unremarkable. Opacification of the sphenoid sinus. Cerebellopontine angles are unremarkable. Cavernous sinuses symmetric.  Scattered foci of increased T2 signal intensity in  the corona radiata centrum semiovale and also within the periventricular white  matter. There is no evidence of mass, hemorrhage, acute infarct or abnormal  extra-axial fluid collection. Normal appearing flow-voids are present in the  vertebral, basilar and carotid artery systems. The craniocervical junction is  normal.    Impression  IMPRESSION:    No acute intracranial process. No intracranial mass, hemorrhage or evidence of  acute infarction. Minimal chronic microvascular ischemic change and sphenoid sinus disease on the  left. Nuclear Medicine Results (maximum last 3): No results found for this or any previous visit. US Results (maximum last 3): Results from [place] encounter on 07/24/12    US TRANSVAGINAL    Narrative  **Final Report**      ICD Codes / Adm. Diagnosis: 780.96   / Generalized pain  Examination:  US TRANSVAGINAL NON OB  - [id number] - Jul 24 2012  1:26PM  Accession No:  [id number]  Reason:  pain in left side      REPORT:  INDICATION:  pain in left side    EXAMINATION:  PELVIC ULTRASOUND    COMPARISON:  None    FINDINGS:    Ultrasound imaging of the pelvis was performed. The transabdominal images  demonstrate a distended  urinary bladder. Uterus measures 5.7 x 2.6 x 3.9  cm. Ovaries are obscured by bowel gas. Transvaginal sonography was performed to better evaluate the endometrium and  adnexa. Myometrial echogenicity is normal. The endometrial stripe measures  2 mm . Ovaries are obscured by bowel gas. There is no pelvic free fluid. IMPRESSION:  Normal sonographic appearance of the uterus. Nonvisualization of the  ovaries. Signing/Reading Doctor: Hugo Dumont ([id number])  Approved: Amanda BYNUM ([id number])  07/24/2012      US PELV NON OBS    Narrative  **Final Report**      ICD Codes / Adm. Diagnosis: 780.96   / Unspecified symptom associated  Examination:  US PELVIC NON OB  - [id number] - Jul 24 2012  1:20PM  Accession No:  [id number]  Reason:  pelvic pain      REPORT:  INDICATION:  pain in left side    EXAMINATION:  PELVIC ULTRASOUND    COMPARISON:  None    FINDINGS:    Ultrasound imaging of the pelvis was performed. The transabdominal images  demonstrate a distended  urinary bladder. Uterus measures 5.7 x 2.6 x 3.9  cm. Ovaries are obscured by bowel gas. Transvaginal sonography was performed to better evaluate the endometrium and  adnexa. Myometrial echogenicity is normal. The endometrial stripe measures  2 mm . Ovaries are obscured by bowel gas. There is no pelvic free fluid. IMPRESSION:  Normal sonographic appearance of the uterus. Nonvisualization of the  ovaries. Signing/Reading Doctor: Michaelle Perea ([id number])  Approved: Michaelle Perea ([id number])  07/24/2012      Assessment and plan:   #Atrial fibrillation with RVR  -Continue Cardizem drip, titrate. ICU admission.   -Continue home PO diltiazem and metoprolol.  -Check K and Mg.   -Consult cardiology, appreciate recommendation    # COPD exacerbation  - Continue supplementary oxygen  - Nebulized bronchodilator treatment: symbicort, spiriva. DuoNeb PRN. - IV solumedrol  - Hold off antibiotics for now, no fever, cough or leukocytosis. Check CRP.  - Check ABG to evaluate for hypercapnia. # Acute respiratory failure with hypoxia  - Secondary to COPD exacerbation. I suspect hypoxia and possibly hypercapnia also contributing to RVR,   - Continue oxygen supplementation, wean as tolerated  - Management as above. # DESIRAE  -LA secondary to dehydration. Likely predisposed to atrial fibrillation RVR  - IVF resuscitation, monitor in AM.     # Hypothyroidism  - Continue home medications. - Check TSH. # Full code by default, need further clarification    # Medication reconciliation: Medication list reviewed on Epic and/or outside documentation. Not reviewed with patient. However, medication reconciliation incomplete, appreciate assistance from pharmacy or nursing staff.     Signed By: Clifford Lanes, MD     December 21, 2022

## 2022-12-21 NOTE — ACP (ADVANCE CARE PLANNING)
Advance Care Planning   Healthcare Decision Maker:       Primary Decision Maker: Diana Parks - Daughter - [phone]    Primary Decision Maker: Genoveva Brannon - Daughter - [phone]

## 2022-12-21 NOTE — CONSULTS
Cardiology Consult    NAME: Mela Parker   :  1950   MRN:  [id number]     Date/Time:  2022 4:38 PM    Patient PCP: Michael Noonan, DO  ________________________________________________________________________     Assessment:   Paroxysmal atrial fibrillation with RVR, not on anticoagulation due to history of GI bleed  Multifocal atrial tachycardia  Chronic diastolic dysfunction  COPD, severe  Hypothyroidism      Plan:   Dual CCB and beta-blocker for rate control  Patient has hard to control A. fib and the last admission she required verapamil and diltiazem along with metoprolol for rate control  Add digoxin as needed for heart rate control  Consider for outpatient DEMARCO closure device ? Watchman versus AVJ ablation      []        High complexity decision making was performed        Subjective:   CHIEF COMPLAINT: SOB      REASON FOR CONSULT: Atrial fibrillation      HISTORY OF PRESENT ILLNESS:     Mela Parker is a 67 y.o. WHITE/NON- female who is a history of paroxysmal atrial fibrillation, COPD on home oxygen, just discharged  with rapid A. fib. She is not a candidate for a watchman device because of her comorbidities. She was up to be considered for possible outpatient AV junction ablation. Returns now complaining of swelling in her legs. Shortness of breath, and says she could do nothing for herself. Says she felt so weak and fatigued that she could hardly get out of bed. Evaluation in ER shows rapid A. fib with heart rate 130-1 50 for which cardiology consult is requested. .        Past Medical History:   Diagnosis Date    Anxiety     Arrhythmia     svt    Calculus of kidney     Chronic atrial fibrillation (HCC)     COPD     emphysema    Depression     GERD (gastroesophageal reflux disease)     Hypothyroidism     Sciatica     Thyroid disease       Past Surgical History:   Procedure Laterality Date    HX BREAST BIOPSY Right     HX GYN      LEEP  for CIN2 HX ORTHOPAEDIC      left leg    VA BREAST SURGERY PROCEDURE UNLISTED      biopsy      Allergies   Allergen Reactions    Nsaids (Non-Steroidal Anti-Inflammatory Drug) Unknown (comments)    Sulfa (Sulfonamide Antibiotics) Rash      Meds:  See below  Social History     Tobacco Use    Smoking status: Former     Types: Cigarettes     Quit date:      Years since quittin.9    Smokeless tobacco: Never   Substance Use Topics    Alcohol use: No      Family History   Problem Relation Age of Onset    Malignant Hyperthermia Neg Hx     Pseudocholinesterase Deficiency Neg Hx     Delayed Awakening Neg Hx     Post-op Nausea/Vomiting Neg Hx     Emergence Delirium Neg Hx     Post-op Cognitive Dysfunction Neg Hx     Other Neg Hx        REVIEW OF SYSTEMS:     []         Unable to obtain  ROS due to ---   [x]         Total of 12 systems reviewed as follows:    Constitutional: negative fever, negative chills, negative weight loss  Eyes:   negative visual changes  ENT:   negative sore throat, tongue or lip swelling  Respiratory: + dyspnea  Cards:  +lower extremity edema  GI:   negative for nausea, vomiting, diarrhea, and abdominal pain  Genitourinary: negative for frequency, dysuria  Integument:  negative for rash   Hematologic:  negative for easy bruising and gum/nose bleeding  Musculoskel: negative for myalgias,  back pain  Neurological:  + weakness  Behavl/Psych: negative for feelings of anxiety, depression     Pertinent Positives include :    Objective:      Physical Exam:    Last 24hrs VS reviewed since prior progress note. Most recent are:    Visit Vitals  /84   Pulse (!) 139   Temp 97.6 °F (36.4 °C)   Resp 29   Ht 5' 4\" (1.626 m)   Wt 63.8 kg (140 lb 10.5 oz)   SpO2 100%   BMI 24.14 kg/m²     No intake or output data in the 24 hours ending 22 1638     General Appearance: Tremulous, alert, no acute distress. Ears/Nose/Mouth/Throat: Moist mucosa  Neck: Supple. JVP within normal limits.  Carotids good upstrokes  Chest: Lungs expiratory wheezes  Cardiovascular: Irregularly irregular, S1S2 normal, soft systolic murmur  Abdomen: Soft, non-tender, bowel sounds are active. Extremities: 1=>2+edema bilaterally. distal pulses +1. Skin: Warm and dry. Neuro: Alert and oriented x3, normal speech; follows simple commands  Psychiatric: Cooperative; appropriate    []         Post-cath site without hematoma, bruit, tenderness, or thrill. Distal pulses intact. Data:      Telemetry: Atrial fibrillation with RVR    EKG: Atrial fibrillation, no ischemia  []  No new EKG for review. 11/22/22    ECHO ADULT COMPLETE 11/23/2022 11/23/2022    Interpretation Summary    Left Ventricle: Normal left ventricular systolic function with a visually estimated EF of 50 - 55%. Left ventricle is smaller than normal. Mild septal thickening. Normal wall motion. Mitral Valve: Mild regurgitation. Tricuspid Valve: The estimated RVSP is 47 mmHg. Signed by: Calli Pearson MD on 11/23/2022  4:54 PM      Prior to Admission medications    Medication Sig Start Date End Date Taking? Authorizing Provider   dilTIAZem ER (CARDIZEM CD) 240 mg capsule Take 1 Capsule by mouth two (2) times a day. 11/30/22   Mohiuddin, Gibson Babinski, MD   levothyroxine (SYNTHROID) 75 mcg tablet Take 1 Tablet by mouth Daily (before breakfast). 12/1/22   Mohiuddin, Gibson Babinski, MD   apixaban (ELIQUIS) 5 mg tablet Take 1 Tablet by mouth two (2) times a day. 11/30/22   Mohiuddin, Gibson Babinski, MD   DULoxetine (CYMBALTA) 60 mg capsule Take 1 Capsule by mouth daily. 12/1/22   Mohiuddin, Gibson Babinski, MD   hydrOXYchloroQUINE (PLAQUENIL) 200 mg tablet Take 1 Tablet by mouth daily (with breakfast). 12/1/22   Mohiuddin, Gibson Babinski, MD   metoprolol tartrate (LOPRESSOR) 50 mg tablet Take 1 Tablet by mouth two (2) times a day. 11/30/22   Mohiuddin, Gibson Babinski, MD   tiotropium bromide (SPIRIVA RESPIMAT) 2.5 mcg/actuation inhaler Take 2 Puffs by inhalation daily.  12/1/22   Mohiuddin, Gibson Babinski, MD   verapamiL ([facility]) 120 mg tablet Take 1 Tablet by mouth three (3) times daily. 11/30/22   Venessa Flores MD   doxycycline (MONODOX) 100 mg capsule Take 1 Capsule by mouth two (2) times a day. 11/30/22   Venessa Flores MD   predniSONE (DELTASONE) 10 mg tablet 2 tablet daily 11/30/22   Venessa Flores MD   gabapentin (NEURONTIN) 600 mg tablet Take 600 mg by mouth three (3) times daily. Provider, Historical   hydrOXYzine HCL (ATARAX) 25 mg tablet Take 25 mg by mouth three (3) times daily as needed. Provider, Historical   pantoprazole (PROTONIX) 40 mg tablet Take 40 mg by mouth daily. Provider, Historical   pravastatin (PRAVACHOL) 40 mg tablet Take 40 mg by mouth daily. 7/2/18   Provider, Historical   montelukast (SINGULAIR) 10 mg tablet Take 10 mg by mouth daily. Provider, Historical   fluticasone-salmeterol (ADVAIR DISKUS) 250-50 mcg/Dose diskus inhaler Take 1 Puff by inhalation every twelve (12) hours. Provider, Historical   IPRATROPIUM/ALBUTEROL SULFATE (COMBIVENT IN) Take 14.7 g by inhalation four (4) times daily. 10/22/10   Provider, Historical   calcium-vitamin D (OYSTER SHELL) 500 mg(1,250mg) -200 unit per tablet Take 1 Tab by mouth two (2) times daily (with meals). Provider, Historical       Recent Results (from the past 24 hour(s))   CBC WITH AUTOMATED DIFF    Collection Time: 12/21/22  2:18 PM   Result Value Ref Range    WBC 6.2 3.6 - 11.0 K/uL    RBC 3.01 (L) 3.80 - 5.20 M/uL    HGB 8.7 (L) 11.5 - 16.0 g/dL    HCT 29.1 (L) 35.0 - 47.0 %    MCV 96.7 80.0 - 99.0 FL    MCH 28.9 26.0 - 34.0 PG    MCHC 29.9 (L) 30.0 - 36.5 g/dL    RDW 14.7 (H) 11.5 - 14.5 %    PLATELET 891 (H) 517 - 400 K/uL    MPV 9.6 8.9 - 12.9 FL    NRBC 0.0 0.0  WBC    ABSOLUTE NRBC 0.00 0.00 - 0.01 K/uL    NEUTROPHILS 75 32 - 75 %    LYMPHOCYTES 13 12 - 49 %    MONOCYTES 10 5 - 13 %    EOSINOPHILS 0 0 - 7 %    BASOPHILS 1 0 - 1 %    IMMATURE GRANULOCYTES 1 (H) 0 - 0.5 %    ABS. NEUTROPHILS 4.7 1.8 - 8.0 K/UL    ABS.  LYMPHOCYTES 0.8 0.8 - 3.5 K/UL    ABS. MONOCYTES 0.6 0.0 - 1.0 K/UL    ABS. EOSINOPHILS 0.0 0.0 - 0.4 K/UL    ABS. BASOPHILS 0.0 0.0 - 0.1 K/UL    ABS. IMM. GRANS. 0.0 0.00 - 0.04 K/UL    DF AUTOMATED     METABOLIC PANEL, COMPREHENSIVE    Collection Time: 12/21/22  2:18 PM   Result Value Ref Range    Sodium 131 (L) 136 - 145 mmol/L    Potassium Hemolysed specimen 3.5 - 5.1 mmol/L    Chloride 89 (L) 97 - 108 mmol/L    CO2 37 (H) 21 - 32 mmol/L    Anion gap 5 5 - 15 mmol/L    Glucose 213 (H) 65 - 100 mg/dL    BUN 15 6 - 20 mg/dL    Creatinine 1.18 (H) 0.55 - 1.02 mg/dL    BUN/Creatinine ratio 13 12 - 20      eGFR 49 (L) >60 ml/min/1.73m2    Calcium 9.5 8.5 - 10.1 mg/dL    Bilirubin, total 0.3 0.2 - 1.0 mg/dL    AST (SGOT) Hemolysed specimen 15 - 37 U/L    ALT (SGPT) 30 12 - 78 U/L    Alk.  phosphatase 79 45 - 117 U/L    Protein, total 6.6 6.4 - 8.2 g/dL    Albumin 3.5 3.5 - 5.0 g/dL    Globulin 3.1 2.0 - 4.0 g/dL    A-G Ratio 1.1 1.1 - 2.2     NT-PRO BNP    Collection Time: 12/21/22  2:18 PM   Result Value Ref Range    NT pro-BNP 4,550 (H) <125 pg/mL   TROPONIN-HIGH SENSITIVITY    Collection Time: 12/21/22  2:18 PM   Result Value Ref Range    Troponin-High Sensitivity 43 0 - 51 ng/L   MAGNESIUM    Collection Time: 12/21/22  2:18 PM   Result Value Ref Range    Magnesium Hemolysed specimen 1.6 - 2.4 mg/dL   CULTURE, BLOOD, PAIRED    Collection Time: 12/21/22  2:18 PM    Specimen: Blood   Result Value Ref Range    Special Requests: No Special Requests      Culture result: No growth after 1 hour     PROCALCITONIN    Collection Time: 12/21/22  2:18 PM   Result Value Ref Range    Procalcitonin <0.05 (H) 0 ng/mL        Octavio Armstrong MD

## 2022-12-21 NOTE — ACP (ADVANCE CARE PLANNING)
Advance Care Planning   Healthcare Decision Maker:       Primary Decision Maker: Mary Escobar - Daughter - [phone]

## 2022-12-21 NOTE — PROGRESS NOTES
Reason for Admission:   Afib                    RUR Score:   15%               PCP: First and Last name:   Kyree Spicer DO     Name of Practice:    Are you a current patient: Yes/No: Yes   Approximate date of last visit: 12/19/22   Can you participate in a virtual visit if needed: Yes/call with assist.     Do you (patient/family) have any concerns for transition/discharge? No              Plan for utilizing home health:   Uses walker/w/c if needed. No HH @ this time. Current Advanced Directive/Advance Care Plan:  Full Code      Healthcare Decision Maker:             Primary Decision Maker: Angel Luis Moses - Daughter - [phone]  Primary - Remedios Abraham @ [phone]. Transition of Care Plan:        D/C Plan is home with daughter Buena Hammans) & daughter to transport. Send Rxs to [facility] upon discharge.

## 2022-12-22 NOTE — PROGRESS NOTES
Progress Note      2022 10:16 AM  NAME: Ilana Reid   MRN:  [id number]   Admit Diagnosis: Atrial fibrillation with RVR (HCC) [I48.91]      Problem List:   Acute respiratory failure on BiPAP  Acute sepsis for support  Atrial fibrillation with intermittent RVR, controlled today  Severe COPD  Chronic diastolic dysfunction  Abdominal pain  DESIRAE  Hyperkalemia with potassium 7.9     Assessment/Plan:   Hold CCB and BB due to sepsis and hypotension  HR controlled on current the 90s and low 100s  Management of hyperkalemia underway  Can try IV dig as needed for rate control  Work-up for sepsis underway         []       High complexity decision making was performed in this patient at high risk for decompensation with multiple organ involvement. Subjective:     Ilana Reid reports abdominal pain and dyspnea  Discussed with RN events overnight. Review of Systems:   Negative except for as noted above. Objective:      Physical Exam:    Last 24hrs VS reviewed since prior progress note. Most recent are:    Visit Vitals  BP (!) 85/60   Pulse 83   Temp (!) 95.6 °F (35.3 °C)   Resp 29   Ht 5' 2\" (1.575 m)   Wt 57.9 kg (127 lb 10.3 oz)   SpO2 95%   BMI 23.35 kg/m²       Intake/Output Summary (Last 24 hours) at 2022 1016  Last data filed at 2022 0620  Gross per 24 hour   Intake 919.61 ml   Output 30 ml   Net 889.61 ml        General Appearance: Alert; no acute distress. Ears/Nose/Mouth/Throat: moist mucous membranes  Neck: Supple. Chest: Lungs scattered rhonchi  Cardiovascular: Regular rate and rhythm, S1S2 normal  Abdomen: Soft,  bowel sounds are active. Extremities: No edema bilaterally. Skin: Warm and dry. []         Post-cath site without hematoma, bruit, tenderness, or thrill. Distal pulses intact.     PMH/SH reviewed - no change compared to H&P    Telemetry: Atrial fibrillation in the s    EK/22/22    ECHO ADULT COMPLETE 2022    Interpretation Summary Left Ventricle: Normal left ventricular systolic function with a visually estimated EF of 50 - 55%. Left ventricle is smaller than normal. Mild septal thickening. Normal wall motion. Mitral Valve: Mild regurgitation. Tricuspid Valve: The estimated RVSP is 47 mmHg. Signed by: William Moses MD on 11/23/2022  4:54 PM      []  No new EKG for review    Lab Data Personally Reviewed:    Recent Labs     12/22/22  0540 12/21/22  1418   WBC 6.2 6.2   HGB 9.3* 8.7*   HCT 31.3* 29.1*   * 657*     No results for input(s): INR, PTP, APTT, INREXT in the last 72 hours. Recent Labs     12/22/22  0540 12/21/22  1418   * 131*   K 7.9* Hemolysed specimen   CL 93* 89*   CO2 29 37*   BUN 23* 15   CREA 2.15* 1.18*   * 213*   CA 9.2 9.5   MG  --  Hemolysed specimen     No results for input(s): CPK, CKNDX, TROIQ in the last 72 hours.     No lab exists for component: CPKMB  No results found for: CHOL, CHOLX, CHLST, CHOLV, HDL, HDLP, LDL, LDLC, DLDLP, TGLX, TRIGL, TRIGP, CHHD, CHHDX    Recent Labs     12/21/22  1418   AP 79   TP 6.6   ALB 3.5   GLOB 3.1     Recent Labs     12/22/22  1010 12/22/22  0702   PH 7.12* 7.13*   PCO2 67* 78*   PO2 67* 138*       Medications Personally Reviewed:    Current Facility-Administered Medications   Medication Dose Route Frequency    NOREPINephrine (LEVOPHED) 8 mg in 0.9% NS 250ml infusion  0.5-30 mcg/min IntraVENous TITRATE    fentaNYL citrate (PF) injection 25 mcg  25 mcg IntraVENous Q1H PRN    piperacillin-tazobactam (ZOSYN) 4.5 g in 0.9% sodium chloride (MBP/ADV) 100 mL MBP  4.5 g IntraVENous ONCE    Followed by    piperacillin-tazobactam (ZOSYN) 3.375 g in 0.9% sodium chloride (MBP/ADV) 100 mL MBP  3.375 g IntraVENous Q12H    dilTIAZem (CARDIZEM) 125 mg in 0.9% sodium chloride 125 mL ([id number])  0-15 mg/hr IntraVENous TITRATE    dilTIAZem ER (CARDIZEM CD) capsule 240 mg  240 mg Oral BID    DULoxetine (CYMBALTA) capsule 60 mg  60 mg Oral DAILY    budesonide-formoteroL (SYMBICORT) 160-4.5 mcg/actuation HFA inhaler 1 Puff  1 Puff Inhalation BID RT    gabapentin (NEURONTIN) capsule 600 mg  600 mg Oral TID    hydrOXYchloroQUINE (PLAQUENIL) tablet 200 mg  200 mg Oral DAILY WITH BREAKFAST    levothyroxine (SYNTHROID) tablet 75 mcg  75 mcg Oral ACB    metoprolol tartrate (LOPRESSOR) tablet 50 mg  50 mg Oral BID    montelukast (SINGULAIR) tablet 10 mg  10 mg Oral DAILY    pantoprazole (PROTONIX) tablet 40 mg  40 mg Oral DAILY    atorvastatin (LIPITOR) tablet 10 mg  10 mg Oral DAILY    tiotropium bromide (SPIRIVA RESPIMAT) 2.5 mcg /actuation  2 Puff Inhalation DAILY    sodium chloride (NS) flush 5-40 mL  5-40 mL IntraVENous Q8H    sodium chloride (NS) flush 5-40 mL  5-40 mL IntraVENous PRN    acetaminophen (TYLENOL) tablet 650 mg  650 mg Oral Q6H PRN    Or    acetaminophen (TYLENOL) suppository 650 mg  650 mg Rectal Q6H PRN    ondansetron (ZOFRAN ODT) tablet 4 mg  4 mg Oral Q8H PRN    Or    ondansetron (ZOFRAN) injection 4 mg  4 mg IntraVENous Q6H PRN    promethazine (PHENERGAN) 12.5 mg in 0.9% sodium chloride 50 mL IVPB  12.5 mg IntraVENous Q4H PRN    methylPREDNISolone (PF) (SOLU-MEDROL) injection 40 mg  40 mg IntraVENous Q8H    albuterol CONCENTRATE 2.5mg/0.5 mL neb soln  2.5 mg Nebulization Q6H PRN    furosemide (LASIX) injection 40 mg  40 mg IntraVENous DAILY    verapamiL (CALAN) tablet 80 mg  80 mg Oral TID         Thania Kilpatrick MD

## 2022-12-22 NOTE — PROGRESS NOTES
Problem: Arrhythmia Pathway (Adult)  Goal: *Absence of arrhythmia  Outcome: Progressing Towards Goal     Problem: Falls - Risk of  Goal: *Absence of Falls  Description: Document Ghazal Fall Risk and appropriate interventions in the flowsheet. Outcome: Progressing Towards Goal  Note: Fall Risk Interventions:                                Problem: Pressure Injury - Risk of  Goal: *Prevention of pressure injury  Description: Document Ismael Scale and appropriate interventions in the flowsheet. Outcome: Progressing Towards Goal  Note: Pressure Injury Interventions:             Activity Interventions: Pressure redistribution bed/mattress(bed type), PT/OT evaluation    Mobility Interventions: Chair cushion, Pressure redistribution bed/mattress (bed type), HOB 30 degrees or less    Nutrition Interventions: Discuss nutritional consult with provider

## 2022-12-22 NOTE — CONSULTS
Infectious Disease Consult Note    Reason for Consult: Sepsis   Date of Consultation: December 22, 2022  Date of Admission: 12/21/2022  Referring Physician: Hospitalist     HPI: 67 y.o WF who presented on 12/21 w c/o dyspnea and rapid HR wound to be in A. fib with RVR. ID consulted today due to severe sepsis due to acute cholecystitis. Her medical history significant for nephrolithiasis, chronic A. fib, COPD, depression/anxiety d/o, GERD, nephrolithiasis and hypothyroidism. She was on BiPAP and unable to provide any history during assessment, history obtained from chart documentations. She has been hypothermic, tachycardic, hypotensive required pressure support, currently on hold, on BIPAP for hypoxia. Pt was undergong hemodialysis during my assessment. Reported RUQ abdominal pain. Todays labs show a WBC of 6.4, K 7.0, Cr 2.55, lactic acid 11.6, up from 10.4 and Procal 0.62. UA On 12/22 ws neg for for pyuria. Blood Cx from 12/21 is neg. CT abdo/pel today revealed acute cholecystitis, incidental nonobstructing right renal calculi and sigmoid diverticulosis. She is on Vanc and Zosyn, s/p Solumedrol, currently on hold.  IR has been consulted for Cystostomy tube placement    Review of Systems: Per HPI otherwise unobtainable, pt on BIPAP and appears confused     Past Medical History:  Past Medical History:   Diagnosis Date    Anxiety     Arrhythmia     svt    Calculus of kidney     Chronic atrial fibrillation ([address]     COPD     emphysema    Depression     GERD (gastroesophageal reflux disease)     Hypothyroidism     Sciatica     Thyroid disease        Past surgical history   Past Surgical History:   Procedure Laterality Date    HX BREAST BIOPSY Right 2010    HX GYN  11/05    LEEP  for CIN2    HX ORTHOPAEDIC      left leg    IR INSERT NON TUNL CVC OVER 5 YRS  12/22/2022    [place] BREAST SURGERY PROCEDURE UNLISTED  2007    biopsy         Social History   Social History     Tobacco Use    Smoking status: Former     Types: Cigarettes     Quit date:      Years since quittin.9    Smokeless tobacco: Never   Substance Use Topics    Alcohol use: No    Drug use: No        Family history   Family History   Problem Relation Age of Onset    Malignant Hyperthermia Neg Hx     Pseudocholinesterase Deficiency Neg Hx     Delayed Awakening Neg Hx     Post-op Nausea/Vomiting Neg Hx     Emergence Delirium Neg Hx     Post-op Cognitive Dysfunction Neg Hx     Other Neg Hx           Allergies: Allergies   Allergen Reactions    Nsaids (Non-Steroidal Anti-Inflammatory Drug) Unknown (comments)    Sulfa (Sulfonamide Antibiotics) Rash         Medications:  No current facility-administered medications on file prior to encounter. Current Outpatient Medications on File Prior to Encounter   Medication Sig Dispense Refill    tiotropium bromide (SPIRIVA RESPIMAT) 2.5 mcg/actuation inhaler Take 2 Puffs by inhalation daily. 4 g 1    furosemide (LASIX) 20 mg tablet Take 20 mg by mouth daily. rOPINIRole (REQUIP) 0.5 mg tablet Take 0.5 mg by mouth every evening. fluticasone propionate (Flonase Allergy Relief) 50 mcg/actuation nasal spray       omeprazole (PRILOSEC) 20 mg capsule Take 20 mg by mouth daily. sodium chloride (OCEAN) 0.65 % nasal squeeze bottle       famotidine (PEPCID) 20 mg tablet       dilTIAZem ER (CARDIZEM CD) 240 mg capsule Take 1 Capsule by mouth two (2) times a day. 30 Capsule 2    levothyroxine (SYNTHROID) 75 mcg tablet Take 1 Tablet by mouth Daily (before breakfast). 60 Tablet 0    apixaban (ELIQUIS) 5 mg tablet Take 1 Tablet by mouth two (2) times a day. (Patient not taking: No sig reported) 60 Tablet 0    DULoxetine (CYMBALTA) 60 mg capsule Take 1 Capsule by mouth daily. 60 Capsule 0    hydrOXYchloroQUINE (PLAQUENIL) 200 mg tablet Take 1 Tablet by mouth daily (with breakfast). 30 Tablet 0    metoprolol tartrate (LOPRESSOR) 50 mg tablet Take 1 Tablet by mouth two (2) times a day.  60 Tablet 0    verapamiL (CALAN) 120 mg tablet Take 1 Tablet by mouth three (3) times daily. (Patient not taking: Reported on 12/22/2022) 30 Tablet 0    doxycycline (MONODOX) 100 mg capsule Take 1 Capsule by mouth two (2) times a day. (Patient not taking: Reported on 12/22/2022) 20 Capsule 0    predniSONE (DELTASONE) 10 mg tablet 2 tablet daily (Patient not taking: Reported on 12/22/2022) 20 Tablet 0    gabapentin (NEURONTIN) 600 mg tablet Take 600 mg by mouth three (3) times daily. (Patient not taking: Reported on 12/22/2022)      pravastatin (PRAVACHOL) 40 mg tablet Take 40 mg by mouth daily. 0    montelukast (SINGULAIR) 10 mg tablet Take 10 mg by mouth daily. (Patient not taking: Reported on 12/22/2022)      fluticasone propion-salmeteroL (ADVAIR/WIXELA) 250-50 mcg/dose diskus inhaler Take 1 Puff by inhalation every twelve (12) hours. (Patient not taking: Reported on 12/22/2022)      IPRATROPIUM/ALBUTEROL SULFATE (COMBIVENT IN) Take 1 Puff by inhalation four (4) times daily.              Physical Exam:    Vitals: Patient Vitals for the past 24 hrs:   Temp Pulse Resp BP SpO2   12/22/22 1745 -- (!) 110 20 -- (!) 85 %   12/22/22 1730 -- (!) 115 (!) 31 (!) 147/100 (!) 82 %   12/22/22 1714 -- (!) 110 -- 108/84 --   12/22/22 1700 -- 87 23 (!) 89/57 --   12/22/22 1646 -- 91 23 106/81 --   12/22/22 1640 -- 96 21 106/81 --   12/22/22 1623 96.8 °F (36 °C) -- -- -- --   12/22/22 1600 -- 89 23 91/78 98 %   12/22/22 1530 96.9 °F (36.1 °C) 89 23 101/85 98 %   12/22/22 1500 -- 86 -- (!) 88/58 --   12/22/22 1400 -- 87 24 125/85 98 %   12/22/22 1330 -- 95 29 (!) 83/71 97 %   12/22/22 1325 -- 79 21 112/85 96 %   12/22/22 1230 -- (!) 103 26 111/85 96 %   12/22/22 1200 -- 94 22 99/81 94 %   12/22/22 1148 (!) 94.8 °F (34.9 °C) -- -- -- --   12/22/22 1130 -- 100 26 (!) 153/107 94 %   12/22/22 1100 -- (!) 102 28 98/74 95 %   12/22/22 1030 -- 99 18 100/79 93 %   12/22/22 1000 -- 98 26 -- 92 %   12/22/22 0930 -- 83 23 92/80 95 %   12/22/22 0900 -- 88 19 (!) 153/133 -- 12/22/22 0830 -- 82 21 (!) 69/16 91 %   12/22/22 0804 (!) 95.6 °F (35.3 °C) -- -- -- --   12/22/22 0800 -- 91 22 (!) 100/34 91 %   12/22/22 0730 -- 78 24 (!) 97/26 --   12/22/22 0617 -- 83 29 (!) 85/60 95 %   12/22/22 0552 -- 82 24 (!) 66/37 --   12/22/22 0547 -- 80 23 (!) 116/92 --   12/22/22 0532 -- 87 25 (!) 81/68 --   12/22/22 0516 -- 71 25 (!) 76/51 --   12/22/22 0500 -- 71 20 (!) 108/91 --   12/22/22 0449 -- 68 23 (!) 77/56 --   12/22/22 0445 -- 69 23 (!) 86/40 --   12/22/22 0440 -- 66 24 (!) 63/38 --   12/22/22 0430 -- 71 25 (!) 71/22 --   12/22/22 0420 -- 73 28 122/88 --   12/22/22 0410 -- 77 29 111/78 --   12/22/22 0400 -- 77 22 (!) 214/180 99 %   12/22/22 0306 -- (!) 104 23 (!) 88/69 98 %   12/22/22 0251 -- (!) 107 29 96/72 100 %   12/22/22 0236 -- 99 25 93/73 99 %   12/22/22 0221 -- -- -- (!) 129/118 --   12/22/22 0206 -- (!) 116 29 (!) 107/58 98 %   12/22/22 0151 -- (!) 103 30 94/76 100 %   12/22/22 0136 -- (!) 103 24 (!) 59/32 97 %   12/22/22 0130 -- (!) 105 (!) 33 (!) 66/48 100 %   12/22/22 0127 -- (!) 120 27 (!) 85/46 99 %   12/22/22 0121 -- (!) 108 (!) 32 (!) 53/30 99 %   12/22/22 0115 -- (!) 113 28 (!) 98/51 --   12/22/22 0055 -- (!) 101 23 (!) 84/67 99 %   12/22/22 0045 -- (!) 101 (!) 34 (!) 76/48 --   12/22/22 0030 -- (!) 112 (!) 31 (!) 90/58 99 %   12/22/22 0029 -- -- -- -- 99 %   12/22/22 0015 -- 87 24 (!) 92/57 99 %   12/22/22 0000 98.1 °F (36.7 °C) (!) 118 25 (!) 118/106 98 %   12/21/22 2345 -- (!) 129 (!) 31 111/79 100 %   12/21/22 2330 -- (!) 128 28 (!) 113/91 100 %   12/21/22 2315 -- (!) 119 28 (!) 103/56 99 %   12/21/22 2314 98.1 °F (36.7 °C) (!) 118 -- (!) 118/106 --   12/21/22 2118 -- (!) 123 25 129/72 94 %   12/21/22 2033 -- (!) 109 30 (!) 155/98 --   12/21/22 1900 -- (!) 123 25 (!) 138/92 --   12/21/22 1813 -- (!) 117 28 (!) 152/94 92 %   12/21/22 1804 -- -- -- -- 99 %     GEN: NAD, confused, decreased responsiveness   HEENT: NCAT, PERRLA, right neck HD access and triple lumen HEART: S1, S2+, RRR, No murmur   Lungs: CTA B/l, decreased at the bases   Abdomen: soft, mildly distended, RUQ tenderness on exam   Genitourinary: no genital discharge, no negrete  Extremities: Trace edema,    Skin: No obvious chronic wounds or ulcers     Labs:   Recent Labs     12/22/22  1612 12/22/22  0540 12/21/22  1418   WBC 6.4 6.2 6.2   HGB 8.0* 9.3* 8.7*   HCT 28.1* 31.3* 29.1*    600* 657*     Recent Labs     12/22/22  1456 12/22/22  0540 12/21/22  1418   BUN 25*  26* 23*  23* 15   CREA 2.55*  2.48* 2.21*  2.15* 1.18*       Lab Results   Component Value Date/Time    C-Reactive protein 0.68 (H) 12/21/2022 11:53 AM      Lab Results   Component Value Date/Time    Sed rate (ESR) 4 07/12/2018 08:10 AM          Microbiology Data:  - Blood Cx 12/21: Pending     Imaging:   CT abdo/pel 12/22: Acute cholecystitis favored. Ultrasound could confirm. Incidental bilateral pleural transudates with associated subsegmental  Atelectasis Incidental nonobstructing right renal calculus and sigmoid diverticulosis    Assessment / Plan:     Severe sepsis/shock due to acute cholecystitis, suspected PNA given hypoxia         No chronic wounds wounds, UA unremarkable, blood Cx 12/21 is pending         Was hypothermic, currently normothermic, s/p levophed/Neosynephrine, both on hold         Normal WBC, Procal 0.62, lactic acid 11.6        Continue on empiric Vanc and Zosyn for now         CBC, CMP, CRP and Procal w AM labs    2. Acute cholecystitis per CT, poor surgical candidate due to co-morbidities per surgery       Reported abdominal pain w reproducible tenderness on exam       IR consulted for cholecystomy tube placement     3. Acute renal failure w metabolic acidosis and hyperkalemia       Started on HD, + right neck temp HD cath     4. Acute hypoxic respiratory failure, on BIPAP, underlying COPD      New small left pleural effusion.  Emphysema on admission CXR       Slightly improved left basilar collapse/consolidation on todays CXR (12/22)      Will screen for COVID 19 and influenza. On Solumedrol, currently on hold     5. Chronic Afib, RVR on admission.  Rate controlled, being followed by cardiology    Reyes Nottingham, MD     12/22/2022

## 2022-12-22 NOTE — CONSULTS
History and Physical    Chief complaints: Cholecystitis   History of Presenting Illness:  Tiffanie Landa is a 67 y.o. very pleasant woman currently ICU. Patient has been not feeling well for a number of weeks. And patient presents to the hospital with shortness of breath and palpitation. Patient currently ICU being treated with atrial fibrillation's. Currently on Cardizem drip. Patient currently on renal failure as well. During the examination patient was getting permacath insertion for dialysis. Patient complain of abdominal pain as well mostly in the right side. Past Medical History:   Diagnosis Date    Anxiety     Arrhythmia     svt    Calculus of kidney     Chronic atrial fibrillation (HCC)     COPD     emphysema    Depression     GERD (gastroesophageal reflux disease)     Hypothyroidism     Sciatica     Thyroid disease       Past Surgical History:   Procedure Laterality Date    HX BREAST BIOPSY Right 2010    HX GYN      LEEP  for CIN2    HX ORTHOPAEDIC      left leg    IR INSERT NON TUNL CVC OVER 5 YRS  2022    [place] BREAST SURGERY PROCEDURE UNLISTED      biopsy      Family History   Problem Relation Age of Onset    Malignant Hyperthermia Neg Hx     Pseudocholinesterase Deficiency Neg Hx     Delayed Awakening Neg Hx     Post-op Nausea/Vomiting Neg Hx     Emergence Delirium Neg Hx     Post-op Cognitive Dysfunction Neg Hx     Other Neg Hx       Social History     Tobacco Use    Smoking status: Former     Types: Cigarettes     Quit date:      Years since quittin.9    Smokeless tobacco: Never   Substance Use Topics    Alcohol use: No       Prior to Admission medications    Medication Sig Start Date End Date Taking? Authorizing Provider   tiotropium bromide (SPIRIVA RESPIMAT) 2.5 mcg/actuation inhaler Take 2 Puffs by inhalation daily. 22  Yes Lovely Goyal MD   furosemide (LASIX) 20 mg tablet Take 20 mg by mouth daily.     Provider, Historical   rOPINIRole (REQUIP) 0.5 mg tablet Take 0.5 mg by mouth every evening. Provider, Historical   fluticasone propionate (Flonase Allergy Relief) 50 mcg/actuation nasal spray     Provider, Historical   omeprazole (PRILOSEC) 20 mg capsule Take 20 mg by mouth daily. Provider, Historical   sodium chloride (OCEAN) 0.65 % nasal squeeze bottle     Provider, Historical   famotidine (PEPCID) 20 mg tablet     Provider, Historical   dilTIAZem ER (CARDIZEM CD) 240 mg capsule Take 1 Capsule by mouth two (2) times a day. 11/30/22   Fanta Flores MD   levothyroxine (SYNTHROID) 75 mcg tablet Take 1 Tablet by mouth Daily (before breakfast). 12/1/22   Fanta Flores MD   apixaban (ELIQUIS) 5 mg tablet Take 1 Tablet by mouth two (2) times a day. Patient not taking: No sig reported 11/30/22   Olivia Bhatia MD   DULoxetine (CYMBALTA) 60 mg capsule Take 1 Capsule by mouth daily. 12/1/22   Fanta Flores MD   hydrOXYchloroQUINE (PLAQUENIL) 200 mg tablet Take 1 Tablet by mouth daily (with breakfast). 12/1/22   Fanta Flores MD   metoprolol tartrate (LOPRESSOR) 50 mg tablet Take 1 Tablet by mouth two (2) times a day. 11/30/22   Fanta Flores MD   verapamiL (CALAN) 120 mg tablet Take 1 Tablet by mouth three (3) times daily. Patient not taking: Reported on 12/22/2022 11/30/22   Fanta Flores MD   doxycycline (MONODOX) 100 mg capsule Take 1 Capsule by mouth two (2) times a day. Patient not taking: Reported on 12/22/2022 11/30/22   Olivia Bhatia MD   predniSONE (DELTASONE) 10 mg tablet 2 tablet daily  Patient not taking: Reported on 12/22/2022 11/30/22   Olivia Bhatia MD   gabapentin (NEURONTIN) 600 mg tablet Take 600 mg by mouth three (3) times daily. Patient not taking: Reported on 12/22/2022    Provider, Historical   pravastatin (PRAVACHOL) 40 mg tablet Take 40 mg by mouth daily. 7/2/18   Provider, Historical   montelukast (SINGULAIR) 10 mg tablet Take 10 mg by mouth daily.   Patient not taking: Reported on 12/22/2022 Provider, Historical   fluticasone propion-salmeteroL (ADVAIR/WIXELA) 250-50 mcg/dose diskus inhaler Take 1 Puff by inhalation every twelve (12) hours. Patient not taking: Reported on 12/22/2022    Provider, Historical   IPRATROPIUM/ALBUTEROL SULFATE (COMBIVENT IN) Take 1 Puff by inhalation four (4) times daily. 10/22/10   Provider, Historical     Allergies   Allergen Reactions    Nsaids (Non-Steroidal Anti-Inflammatory Drug) Unknown (comments)    Sulfa (Sulfonamide Antibiotics) Rash        Review of Systems:  Pertinent review of systems discussed in HPI, and rest of organ systems personally reviewed and they are negative. Objective:   Vital signs reviewed:      Visit Vitals  BP 91/78   Pulse 89   Temp 96.9 °F (36.1 °C)   Resp 23   Ht 5' 2\" (1.575 m)   Wt 127 lb 10.3 oz (57.9 kg)   SpO2 98%   BMI 23.35 kg/m²       Physical Exam:   General appearance:   Patient is awake and alert, not in particular distress. Head and neck atraumatic normocephalic. ENT shows normal oral mucosa, no jaundice no hoarse voice. Eyes: Pupil equal gaze appropriate. Cardiac system regular rate rhythm. Pulmonary: No audible wheeze. Chest wall: Chest wall excursion normal with respiration cycle, there is no deformity or chest trauma. Abdomen: Tender abdomen on the right side. Neurologic: Nonfocal.  Cranial nerves intact, no new focal findings. Musculoskeletal system: Motor function normal limits, motor function 5 out of 5, range of motion normal in all 4 extremity  Skin: Warm and moist.  Hematologic system: No obvious bruising. Psychosocial: Appropriate and cooperative. Vascular examination: Lower and upper extremities warm to touch, no signs of ischemia or cyanosis. Data Review: Labs are reviewed.  Discussed  Recent Results (from the past 24 hour(s))   MRSA SCREEN - PCR (NASAL)    Collection Time: 12/21/22 11:45 PM   Result Value Ref Range    MRSA by PCR, Nasal Not Detected Not Detected     URINALYSIS W/ RFLX MICROSCOPIC    Collection Time: 12/22/22  2:45 AM   Result Value Ref Range    Color Yellow      Appearance Clear Clear      Specific gravity 1.025 1.003 - 1.030      pH (UA) 5.0 5.0 - 8.0      Protein Trace (A) Negative mg/dL    Glucose Negative Negative mg/dL    Ketone Negative Negative mg/dL    Bilirubin Negative Negative      Blood Negative Negative      Urobilinogen Normal 0.1 - 1.0 EU/dL    Nitrites Negative Negative      Leukocyte Esterase Negative Negative      WBC 0-4 0 - 4 /hpf    RBC 0-5 0 - 5 /hpf    Bacteria Negative Negative /hpf   SODIUM, UR, RANDOM    Collection Time: 12/22/22  3:11 AM   Result Value Ref Range    Sodium,urine random <5 mmol/L   CREATININE, UR, RANDOM    Collection Time: 12/22/22  3:11 AM   Result Value Ref Range    Creatinine, urine random 372.76 mg/dL   METABOLIC PANEL, BASIC    Collection Time: 12/22/22  5:40 AM   Result Value Ref Range    Sodium 130 (L) 136 - 145 mmol/L    Potassium 7.9 (HH) 3.5 - 5.1 mmol/L    Chloride 93 (L) 97 - 108 mmol/L    CO2 29 21 - 32 mmol/L    Anion gap 8 5 - 15 mmol/L    Glucose 160 (H) 65 - 100 mg/dL    BUN 23 (H) 6 - 20 mg/dL    Creatinine 2.15 (H) 0.55 - 1.02 mg/dL    BUN/Creatinine ratio 11 (L) 12 - 20      eGFR 24 (L) >60 ml/min/1.73m2    Calcium 9.2 8.5 - 10.1 mg/dL   CBC W/O DIFF    Collection Time: 12/22/22  5:40 AM   Result Value Ref Range    WBC 6.2 3.6 - 11.0 K/uL    RBC 3.18 (L) 3.80 - 5.20 M/uL    HGB 9.3 (L) 11.5 - 16.0 g/dL    HCT 31.3 (L) 35.0 - 47.0 %    MCV 98.4 80.0 - 99.0 FL    MCH 29.2 26.0 - 34.0 PG    MCHC 29.7 (L) 30.0 - 36.5 g/dL    RDW 14.7 (H) 11.5 - 14.5 %    PLATELET 262 (H) 203 - 400 K/uL    MPV 9.4 8.9 - 12.9 FL    NRBC 1.6 (H) 0.0  WBC    ABSOLUTE NRBC 0.10 (H) 0.00 - 0.01 K/uL   PROCALCITONIN    Collection Time: 12/22/22  5:40 AM   Result Value Ref Range    Procalcitonin 0.33 (H) 0 ng/mL   CK    Collection Time: 12/22/22  5:40 AM   Result Value Ref Range    CK 88 26 - 192 U/L   URIC ACID    Collection Time: 12/22/22  5:40 AM   Result Value Ref Range    Uric acid 7.1 (H) 2.6 - 6.0 mg/dL   RENAL FUNCTION PANEL    Collection Time: 12/22/22  5:40 AM   Result Value Ref Range    Sodium 131 (L) 136 - 145 mmol/L    Potassium 8.2 (HH) 3.5 - 5.1 mmol/L    Chloride 94 (L) 97 - 108 mmol/L    CO2 22 21 - 32 mmol/L    Anion gap 15 5 - 15 mmol/L    Glucose 158 (H) 65 - 100 mg/dL    BUN 23 (H) 6 - 20 mg/dL    Creatinine 2.21 (H) 0.55 - 1.02 mg/dL    BUN/Creatinine ratio 10 (L) 12 - 20      eGFR 23 (L) >60 ml/min/1.73m2    Calcium 9.2 8.5 - 10.1 mg/dL    Phosphorus 8.2 (H) 2.6 - 4.7 mg/dL    Albumin 3.1 (L) 3.5 - 5.0 g/dL   BLOOD GAS, ARTERIAL    Collection Time: 12/22/22  7:02 AM   Result Value Ref Range    pH 7.13 (LL) 7.35 - 7.45      PCO2 78 (H) 35 - 45 mmHg    PO2 138 (H) 80 - 100 mmHg    O2 SATURATION 98 95 - 99 %    BICARBONATE 26 22 - 26 mmol/L    BASE DEFICIT 5.2 mmol/L    O2 METHOD VENTURI MASK      FIO2 50.0 %    Sample source Arterial      SITE Right Radial      GUMARO'S TEST YES      Carboxy-Hgb 0.3 (L) 1 - 2 %    Methemoglobin 0.2 0 - 1.4 %    Oxyhemoglobin 97.6 95 - 99 %    Performed by Treva Willis     Critical value read back Called to ICU RN  on 12/22/2022 at 07:05     TEMPERATURE 96.0     BLOOD GAS, ARTERIAL    Collection Time: 12/22/22 10:10 AM   Result Value Ref Range    pH 7.12 (LL) 7.35 - 7.45      PCO2 67 (H) 35 - 45 mmHg    PO2 67 (L) 80 - 100 mmHg    O2 SATURATION 90 (L) 95 - 99 %    BICARBONATE 22 22 - 26 mmol/L    BASE DEFICIT 8.2 mmol/L    O2 METHOD BiPAP      FIO2 32 %    MODE BiPAP      SET RATE 18      IPAP/PIP 16      PEEP/CPAP 6.0      Sample source Arterial      SITE Right Brachial      GUMARO'S TEST NOT APPLICABLE      Carboxy-Hgb 0.1 (L) 1 - 2 %    Methemoglobin 0.2 0 - 1.4 %    Oxyhemoglobin 89.4 (L) 95 - 99 %    Performed by Treva Willis     Critical value read back       Called to Jimena Jones RN on 12/22/2022 at 10:13    TEMPERATURE 96.2     EKG, 12 LEAD, INITIAL    Collection Time: 12/22/22 10:38 AM   Result Value Ref Range    Ventricular Rate 99 BPM    Atrial Rate 78 BPM    QRS Duration 98 ms    Q-T Interval 460 ms    QTC Calculation (Bezet) 590 ms    Calculated R Axis 103 degrees    Calculated T Axis -21 degrees    Diagnosis       Atrial fibrillation  Incomplete right bundle branch block  Possible Lateral infarct , age undetermined  ST & T wave abnormality, consider anterior ischemia  Prolonged QT  Abnormal ECG  When compared with ECG of 21-DEC-2022 14:03, (Unconfirmed)  Vent.  rate has decreased BY  53 BPM  Non-specific change in ST segment in Lateral leads  T wave inversion now evident in Inferior leads  T wave inversion now evident in Anterior leads  Confirmed by [facility] MD, Lev Henley ([id number]) on 12/22/2022 1:30:59 PM     LACTIC ACID    Collection Time: 12/22/22 11:53 AM   Result Value Ref Range    Lactic acid 10.4 (HH) 0.4 - 2.0 mmol/L   BLOOD GAS, ARTERIAL    Collection Time: 12/22/22  1:04 PM   Result Value Ref Range    pH 7.20 (LL) 7.35 - 7.45      PCO2 53 (H) 35 - 45 mmHg    PO2 74 (L) 80 - 100 mmHg    O2 SATURATION 93 (L) 95 - 99 %    BICARBONATE 20 (L) 22 - 26 mmol/L    BASE DEFICIT 7.8 mmol/L    O2 METHOD BiPAP      FIO2 28 %    Tidal volume 400.0      SET RATE 18      PEEP/CPAP 6.0      Sample source Arterial      SITE Right Radial      GUMARO'S TEST YES      Carboxy-Hgb 0.2 (L) 1 - 2 %    Methemoglobin 0.2 0 - 1.4 %    Oxyhemoglobin 92.3 (L) 95 - 99 %    Performed by Savannah Hashimoto     Critical value read back Called to Dr. Aline Rendon  on 12/22/2022 at 13:29     TEMPERATURE 80.9     METABOLIC PANEL, BASIC    Collection Time: 12/22/22  2:56 PM   Result Value Ref Range    Sodium 132 (L) 136 - 145 mmol/L    Potassium 7.0 (HH) 3.5 - 5.1 mmol/L    Chloride 94 (L) 97 - 108 mmol/L    CO2 22 21 - 32 mmol/L    Anion gap 16 (H) 5 - 15 mmol/L    Glucose 71 65 - 100 mg/dL    BUN 26 (H) 6 - 20 mg/dL    Creatinine 2.48 (H) 0.55 - 1.02 mg/dL    BUN/Creatinine ratio 10 (L) 12 - 20      eGFR 20 (L) >60 ml/min/1.73m2    Calcium 7.9 (L) 8.5 - 10.1 mg/dL   RENAL FUNCTION PANEL    Collection Time: 12/22/22  2:56 PM   Result Value Ref Range    Sodium 131 (L) 136 - 145 mmol/L    Potassium 7.0 (HH) 3.5 - 5.1 mmol/L    Chloride 94 (L) 97 - 108 mmol/L    CO2 21 21 - 32 mmol/L    Anion gap 16 (H) 5 - 15 mmol/L    Glucose 71 65 - 100 mg/dL    BUN 25 (H) 6 - 20 mg/dL    Creatinine 2.55 (H) 0.55 - 1.02 mg/dL    BUN/Creatinine ratio 10 (L) 12 - 20      eGFR 19 (L) >60 ml/min/1.73m2    Calcium 7.8 (L) 8.5 - 10.1 mg/dL    Phosphorus PENDING mg/dL    Albumin 2.7 (L) 3.5 - 5.0 g/dL   LACTIC ACID    Collection Time: 12/22/22  2:56 PM   Result Value Ref Range    Lactic acid 11.6 (HH) 0.4 - 2.0 mmol/L         No images are attached to the encounter. Imagings reviewed: discussed as below. No name on file. Assessment:     Active Problems:    Atrial fibrillation with RVR ([address] (11/22/2022)        Plan:   Patient currently have her multiple significant comorbid conditions. Patient's left lung pleural effusion looks concerning, I am not sure its been loculated. Abdominal examination on CT scan shows acute cholecystitis. Patient does have tender abdomen right side. Due to her significant comorbid conditions I am not sure patient will be a good candidate for surgical laparoscopic cholecystectomy. I will consult IR to see if possibly place percutaneous cholecystostomy tube placement.

## 2022-12-22 NOTE — CONSULTS
Consult Date: 12/22/2022    IP CONSULT TO PULMONOLOGY  Consult performed by: Jo Holliday MD  Consult ordered by: Madelin Guillen MD    History of present illness:  Patient is a 79-year-old  female with multiple medical problems. She has severe COPD on oxygen 3 L all the time. She sees my partner Dr. Eveleen Severin in the practice. She has chronic atrial fibrillation but not anticoagulated because of a history of GI bleed. She presented to the hospital with abdominal pains. I was called this morning with a potassium of 7.9. Orders were given. Blood gases showing a pH of 7.13. I placed her on BiPAP. Currently in her room she is on BiPAP 16/6 with 32% oxygen. Prior to that she was briefly on 50% Ventimask and before that she was on 3 L oxygen. However she is septic. She is now requiring increasing doses of Levophed currently at 24 mics. A. fib rate is under control. She is developing acute renal failure with no urine output. However she is not in any distress. She is awake and alert. Family members are at the bedside. CT of the abdomen pelvis is suggesting acute cholecystitis. I have asked for nephrology consult and surgical consult as well. Cardiology is already consulted.     Subjective     Past Medical History:   Diagnosis Date    Anxiety     Arrhythmia     svt    Calculus of kidney     Chronic atrial fibrillation (HCC)     COPD     emphysema    Depression     GERD (gastroesophageal reflux disease)     Hypothyroidism     Sciatica     Thyroid disease       Past Surgical History:   Procedure Laterality Date    HX BREAST BIOPSY Right 2010    HX GYN  11/05    LEEP  for CIN2    HX ORTHOPAEDIC      left leg    [place] BREAST SURGERY PROCEDURE UNLISTED  2007    biopsy      Family History   Problem Relation Age of Onset    Malignant Hyperthermia Neg Hx     Pseudocholinesterase Deficiency Neg Hx     Delayed Awakening Neg Hx     Post-op Nausea/Vomiting Neg Hx     Emergence Delirium Neg Hx     Post-op Cognitive Dysfunction Neg Hx     Other Neg Hx       Social History     Tobacco Use    Smoking status: Former     Types: Cigarettes     Quit date:      Years since quittin.9    Smokeless tobacco: Never   Substance Use Topics    Alcohol use: No       Current Facility-Administered Medications   Medication Dose Route Frequency Provider Last Rate Last Admin    NOREPINephrine (LEVOPHED) 8 mg in 0.9% NS 250ml infusion  0.5-30 mcg/min IntraVENous TITRATE Fadumo Green MD 45 mL/hr at 22 0920 24 mcg/min at 22 0920    fentaNYL citrate (PF) injection 25 mcg  25 mcg IntraVENous Q1H PRN Fadumo Green MD   25 mcg at 22 0425    piperacillin-tazobactam (ZOSYN) 4.5 g in 0.9% sodium chloride (MBP/ADV) 100 mL MBP  4.5 g IntraVENous ONCE Renetta Molina MD        Followed by    piperacillin-tazobactam (ZOSYN) 3.375 g in 0.9% sodium chloride (MBP/ADV) 100 mL MBP  3.375 g IntraVENous Q12H Renetta Molina MD        dilTIAZem (CARDIZEM) 125 mg in 0.9% sodium chloride 125 mL ([id number])  0-15 mg/hr IntraVENous TITRATE Bijan Yan MD   Stopped at 22 0100    dilTIAZem ER (CARDIZEM CD) capsule 240 mg  240 mg Oral BID Bijan Yan MD   240 mg at 22 2329    DULoxetine (CYMBALTA) capsule 60 mg  60 mg Oral DAILY Bijan Yan MD   60 mg at 22 0848    budesonide-formoteroL (SYMBICORT) 160-4.5 mcg/actuation HFA inhaler 1 Puff  1 Puff Inhalation BID RT Bijan Yan MD   1 Puff at 22 1806    gabapentin (NEURONTIN) capsule 600 mg  600 mg Oral TID Bijan Yan MD   600 mg at 22 0848    hydrOXYchloroQUINE (PLAQUENIL) tablet 200 mg  200 mg Oral DAILY WITH BREAKFAST Bijan Yan MD   200 mg at 22 0848    levothyroxine (SYNTHROID) tablet 75 mcg  75 mcg Oral ACB Bijan Yan MD   75 mcg at 22 0848    metoprolol tartrate (LOPRESSOR) tablet 50 mg  50 mg Oral BID Bijan Yan MD   50 mg at 22 2177    montelukast (SINGULAIR) tablet 10 mg  10 mg Oral DAILY Meliza Calderón MD Gaudencio   10 mg at 12/22/22 0848    pantoprazole (PROTONIX) tablet 40 mg  40 mg Oral DAILY Vinicius Jacobs MD   40 mg at 12/22/22 0848    atorvastatin (LIPITOR) tablet 10 mg  10 mg Oral DAILY Frank Molina MD   10 mg at 12/22/22 0848    tiotropium bromide (SPIRIVA RESPIMAT) 2.5 mcg /actuation  2 Puff Inhalation DAILY Frank Molina MD        sodium chloride (NS) flush 5-40 mL  5-40 mL IntraVENous Q8H Frank Molina MD   10 mL at 12/22/22 0543    sodium chloride (NS) flush 5-40 mL  5-40 mL IntraVENous PRN Frank Molina MD        acetaminophen (TYLENOL) tablet 650 mg  650 mg Oral Q6H PRN Vinicius Jacobs MD   650 mg at 12/22/22 8208    Or    acetaminophen (TYLENOL) suppository 650 mg  650 mg Rectal Q6H PRN Frank Molina MD        ondansetron (ZOFRAN ODT) tablet 4 mg  4 mg Oral Q8H PRN Vinicius Jacobs MD   4 mg at 12/22/22 0848    Or    ondansetron (ZOFRAN) injection 4 mg  4 mg IntraVENous Q6H PRN Frank Molina MD        promethazine (PHENERGAN) 12.5 mg in 0.9% sodium chloride 50 mL IVPB  12.5 mg IntraVENous Q4H PRN Frank Molina MD        methylPREDNISolone (PF) (SOLU-MEDROL) injection 40 mg  40 mg IntraVENous Q8H Frank Molina MD   40 mg at 12/22/22 0543    albuterol CONCENTRATE 2.5mg/0.5 mL neb soln  2.5 mg Nebulization Q6H PRN Frank Molina MD        furosemide (LASIX) injection 40 mg  40 mg IntraVENous DAILY Shimon Sanz MD        verapamiL (CALAN) tablet 80 mg  80 mg Oral TID Shimon Sanz MD   80 mg at 12/21/22 5606        Review of Systems:  Complete review of system was undertaken. Pertinent findings are discussed above. All other systems were reviewed and are negative. Objective     Vital signs for last 24 hours:  Visit Vitals  BP (!) 85/60   Pulse 83   Temp (!) 95.6 °F (35.3 °C)   Resp 29   Ht 5' 2\" (1.575 m)   Wt 57.9 kg (127 lb 10.3 oz)   SpO2 95%   BMI 23.35 kg/m²       Intake/Output this shift:  Current Shift: No intake/output data recorded.   Last 3 Shifts: 12/20 1901 - 12/22 0700  In: 919.6 [I.V.:919.6]  Out: 27 [Urine:30]    Data Review:   Recent Results (from the past 24 hour(s))   CBC WITH AUTOMATED DIFF    Collection Time: 12/21/22  2:18 PM   Result Value Ref Range    WBC 6.2 3.6 - 11.0 K/uL    RBC 3.01 (L) 3.80 - 5.20 M/uL    HGB 8.7 (L) 11.5 - 16.0 g/dL    HCT 29.1 (L) 35.0 - 47.0 %    MCV 96.7 80.0 - 99.0 FL    MCH 28.9 26.0 - 34.0 PG    MCHC 29.9 (L) 30.0 - 36.5 g/dL    RDW 14.7 (H) 11.5 - 14.5 %    PLATELET 642 (H) 942 - 400 K/uL    MPV 9.6 8.9 - 12.9 FL    NRBC 0.0 0.0  WBC    ABSOLUTE NRBC 0.00 0.00 - 0.01 K/uL    NEUTROPHILS 75 32 - 75 %    LYMPHOCYTES 13 12 - 49 %    MONOCYTES 10 5 - 13 %    EOSINOPHILS 0 0 - 7 %    BASOPHILS 1 0 - 1 %    IMMATURE GRANULOCYTES 1 (H) 0 - 0.5 %    ABS. NEUTROPHILS 4.7 1.8 - 8.0 K/UL    ABS. LYMPHOCYTES 0.8 0.8 - 3.5 K/UL    ABS. MONOCYTES 0.6 0.0 - 1.0 K/UL    ABS. EOSINOPHILS 0.0 0.0 - 0.4 K/UL    ABS. BASOPHILS 0.0 0.0 - 0.1 K/UL    ABS. IMM. GRANS. 0.0 0.00 - 0.04 K/UL    DF AUTOMATED     METABOLIC PANEL, COMPREHENSIVE    Collection Time: 12/21/22  2:18 PM   Result Value Ref Range    Sodium 131 (L) 136 - 145 mmol/L    Potassium Hemolysed specimen 3.5 - 5.1 mmol/L    Chloride 89 (L) 97 - 108 mmol/L    CO2 37 (H) 21 - 32 mmol/L    Anion gap 5 5 - 15 mmol/L    Glucose 213 (H) 65 - 100 mg/dL    BUN 15 6 - 20 mg/dL    Creatinine 1.18 (H) 0.55 - 1.02 mg/dL    BUN/Creatinine ratio 13 12 - 20      eGFR 49 (L) >60 ml/min/1.73m2    Calcium 9.5 8.5 - 10.1 mg/dL    Bilirubin, total 0.3 0.2 - 1.0 mg/dL    AST (SGOT) Hemolysed specimen 15 - 37 U/L    ALT (SGPT) 30 12 - 78 U/L    Alk.  phosphatase 79 45 - 117 U/L    Protein, total 6.6 6.4 - 8.2 g/dL    Albumin 3.5 3.5 - 5.0 g/dL    Globulin 3.1 2.0 - 4.0 g/dL    A-G Ratio 1.1 1.1 - 2.2     NT-PRO BNP    Collection Time: 12/21/22  2:18 PM   Result Value Ref Range    NT pro-BNP 4,550 (H) <125 pg/mL   TROPONIN-HIGH SENSITIVITY    Collection Time: 12/21/22  2:18 PM   Result Value Ref Range Troponin-High Sensitivity 43 0 - 51 ng/L   MAGNESIUM    Collection Time: 12/21/22  2:18 PM   Result Value Ref Range    Magnesium Hemolysed specimen 1.6 - 2.4 mg/dL   CULTURE, BLOOD, PAIRED    Collection Time: 12/21/22  2:18 PM    Specimen: Blood   Result Value Ref Range    Special Requests: No Special Requests      Culture result: No growth after 18 hours     PROCALCITONIN    Collection Time: 12/21/22  2:18 PM   Result Value Ref Range    Procalcitonin <0.05 (H) 0 ng/mL   MRSA SCREEN - PCR (NASAL)    Collection Time: 12/21/22 11:45 PM   Result Value Ref Range    MRSA by PCR, Nasal Not Detected Not Detected     URINALYSIS W/ RFLX MICROSCOPIC    Collection Time: 12/22/22  2:45 AM   Result Value Ref Range    Color Yellow      Appearance Clear Clear      Specific gravity 1.025 1.003 - 1.030      pH (UA) 5.0 5.0 - 8.0      Protein Trace (A) Negative mg/dL    Glucose Negative Negative mg/dL    Ketone Negative Negative mg/dL    Bilirubin Negative Negative      Blood Negative Negative      Urobilinogen Normal 0.1 - 1.0 EU/dL    Nitrites Negative Negative      Leukocyte Esterase Negative Negative      WBC 0-4 0 - 4 /hpf    RBC 0-5 0 - 5 /hpf    Bacteria Negative Negative /hpf   METABOLIC PANEL, BASIC    Collection Time: 12/22/22  5:40 AM   Result Value Ref Range    Sodium 130 (L) 136 - 145 mmol/L    Potassium 7.9 (HH) 3.5 - 5.1 mmol/L    Chloride 93 (L) 97 - 108 mmol/L    CO2 29 21 - 32 mmol/L    Anion gap 8 5 - 15 mmol/L    Glucose 160 (H) 65 - 100 mg/dL    BUN 23 (H) 6 - 20 mg/dL    Creatinine 2.15 (H) 0.55 - 1.02 mg/dL    BUN/Creatinine ratio 11 (L) 12 - 20      eGFR 24 (L) >60 ml/min/1.73m2    Calcium 9.2 8.5 - 10.1 mg/dL   CBC W/O DIFF    Collection Time: 12/22/22  5:40 AM   Result Value Ref Range    WBC 6.2 3.6 - 11.0 K/uL    RBC 3.18 (L) 3.80 - 5.20 M/uL    HGB 9.3 (L) 11.5 - 16.0 g/dL    HCT 31.3 (L) 35.0 - 47.0 %    MCV 98.4 80.0 - 99.0 FL    MCH 29.2 26.0 - 34.0 PG    MCHC 29.7 (L) 30.0 - 36.5 g/dL    RDW 14.7 (H) 11.5 - 14.5 %    PLATELET 522 (H) 869 - 400 K/uL    MPV 9.4 8.9 - 12.9 FL    NRBC 1.6 (H) 0.0  WBC    ABSOLUTE NRBC 0.10 (H) 0.00 - 0.01 K/uL   BLOOD GAS, ARTERIAL    Collection Time: 12/22/22  7:02 AM   Result Value Ref Range    pH 7.13 (LL) 7.35 - 7.45      PCO2 78 (H) 35 - 45 mmHg    PO2 138 (H) 80 - 100 mmHg    O2 SATURATION 98 95 - 99 %    BICARBONATE 26 22 - 26 mmol/L    BASE DEFICIT 5.2 mmol/L    O2 METHOD VENTURI MASK      FIO2 50.0 %    Sample source Arterial      SITE Right Radial      GUMARO'S TEST YES      Carboxy-Hgb 0.3 (L) 1 - 2 %    Methemoglobin 0.2 0 - 1.4 %    Oxyhemoglobin 97.6 95 - 99 %    Performed by Vinod Valencia     Critical value read back Called to ICU RN  on 12/22/2022 at 07:05     TEMPERATURE 96.0         Physical Exam:  On examination the patient is elderly white female who is on the BiPAP. Vital signs as above. She is hypothermic and is on a warming blanket. She is hypotensive on Levophed. She does not have a central line. HEENT exam shows pupils are equal and reactive to light. Pallor is noted. No icterus. She has a BiPAP mask in place. Neck is supple and trachea central.  No obvious JVD or lymphadenopathy. She is not using any accessory muscle respiration. Chest symmetrical with equal and diminished to fair air entry bilaterally. No significant rhonchi or wheezing. She has diminished breath sounds over the bases with a few crackles. Rhythm is irregularly irregular with monitor showing atrial fibrillation. Rate is under control. Abdomen is obese. She is diffusely tender but more pronounced in the right upper quadrant. Bowel sounds are hypoactive. No masses organomegaly. Extremities do not show any cyanosis or clubbing. No significant pitting edema. Pulses are palpable. Neurologic system examination shows that the patient is awake and alert. On BiPAP in no acute distress. Skin is warm and dry.     Laboratory data:  Portable chest x-ray done on 12/21 showed development of a new small left pleural effusion. CT of the abdomen pelvis without IV contrast done on 12/22/2022 was personally reviewed. She has small bilateral pleural effusions with associated atelectasis. Report is suggesting acute cholecystitis. Arterial blood gases done on 50% Ventimask showing 7.13/78/138. WBC count 6.2 with neutrophils of 75% hemoglobin is 9.3 and platelet count of 358. D-dimer 0.61. Electrolytes showing a potassium of 7.9 bicarb is 29 BUN is 23 and creatinine is 2.15. Blood glucose of 160. BNP 4550. Troponin 43 and procalcitonin less than 0.05 echocardiogram on 11/23/2022 showing EF of 50 to 55% with right ventricle systolic pressure of 47. Assessment and plan:  1. Acute hypoxic hypercarbic respiratory failure. This is mainly due to underlying sepsis/septic shock. The patient does have underlying advanced COPD, on oxygen 3 L all the time. Blood gases done on 50% Ventimask showing 7.13/78/138. She is currently on BiPAP 16/6 with 32% O2. Saturation 96%. Tidal volume around 300. We will repeat blood gas soon and make further adjustments. She is not in any distress, awake and alert. However if she develops progressive respiratory failure she will need to be intubated. Chest x-ray shows development of a small new left pleural effusion. CT of the abdomen pelvis showed small bilateral pleural effusions with associated atelectasis. Will repeat blood gas and chest x-ray in the morning. Continue with Symbicort 160/4.5, 2 inhalations twice daily. We will start her on nebulized bronchodilator treatments on a as needed basis. We will hold steroids at this time. 2.  Septic shock. The patient is currently on Levophed at 24 mics. I believe this is stemming from acute cholecystitis. She will need a central line and IR is consulted. Blood cultures are ordered. She is empirically started on IV Zosyn. I will give her 1 dose of vancomycin.   General surgery is consulted also. 3.  Acute renal failure. Also severe hyperkalemia. I have given her Kayexalate, insulin and D50. We will repeat basic metabolic profile soon. Nephrology is also consulted. She has a pure wick catheter and urine output appears to be very poor. 4.  Chronic atrial fibrillation. Not anticoagulated because of history of GI bleed. Cardiology is consulted. Currently her rate is well controlled. Her BNP is elevated at 4550.  5.  Her other comorbid medical conditions include hypothyroidism and reflux disease. 6.  For DVT prophylaxis I will start her on SCDs to the lower extremities. No chemical prophylaxis at this time. 7.  For GI stress prophylaxis she is started on Protonix orally. Thank you for allowing me to participate in the care of this patient. The patient is critically ill. Discussed with ICU team, primary attending and cardiology as well as nephrology. Discussed with the son at the bedside. More than 1 hour spent in the management of this patient. Prognosis appears to be guarded.

## 2022-12-22 NOTE — PROGRESS NOTES
Patient having increased levophed demands and oxygen saturations decreasing. Respiratory therapist at bedside to complete and ABG. Unable to get an accurate temperature reading. Warming blanket applied to increase temperature. Daughter Brionna Awan updated.

## 2022-12-22 NOTE — PROGRESS NOTES
Patient complaining of 10/10 abdominal pain. RN notified Orvan Nones for pain medication. STAT CT of abdomen and pelvis ordered. Fentanyl 25 mcg ordered PRN for pain.

## 2022-12-22 NOTE — PROGRESS NOTES
Problem: Discharge Planning  Goal: *Discharge to safe environment  Outcome: Progressing Towards Goal  Goal: *Knowledge of medication management  Outcome: Progressing Towards Goal  Goal: *Knowledge of discharge instructions  Outcome: Progressing Towards Goal     Problem: Arrhythmia Pathway (Adult)  Goal: *Absence of arrhythmia  Outcome: Progressing Towards Goal     Problem: Falls - Risk of  Goal: *Absence of Falls  Description: Document Ghazal Fall Risk and appropriate interventions in the flowsheet. 12/22/2022 0009 by Juan Balbuena RN  Outcome: Progressing Towards Goal  Note: Fall Risk Interventions:                             12/22/2022 0007 by Juan Balbuena RN  Outcome: Progressing Towards Goal  Note: Fall Risk Interventions:                                Problem: Patient Education: Go to Patient Education Activity  Goal: Patient/Family Education  Outcome: Progressing Towards Goal     Problem: Pressure Injury - Risk of  Goal: *Prevention of pressure injury  Description: Document Ismael Scale and appropriate interventions in the flowsheet. 12/22/2022 0009 by Juan Balbuena RN  Outcome: Progressing Towards Goal  Note: Pressure Injury Interventions: Activity Interventions: Pressure redistribution bed/mattress(bed type), PT/OT evaluation    Mobility Interventions: Chair cushion, Pressure redistribution bed/mattress (bed type), HOB 30 degrees or less    Nutrition Interventions: Discuss nutritional consult with provider                  12/22/2022 0007 by Juan Balbuena RN  Outcome: Progressing Towards Goal  Note: Pressure Injury Interventions:             Activity Interventions: Pressure redistribution bed/mattress(bed type), PT/OT evaluation    Mobility Interventions: Chair cushion, Pressure redistribution bed/mattress (bed type), HOB 30 degrees or less    Nutrition Interventions: Discuss nutritional consult with provider

## 2022-12-22 NOTE — PROGRESS NOTES
Hospitalist Progress Note    Subjective:   Daily Progress Note: 12/22/2022 10:17 AM    Brief HPI/ hospital course:  Ilana Reid is a 67 y.o. female with PMH of atrial fibrillation, SVT, COPD on home 3L NC and other medical problem as below. She presented to the ED with chief complaint of generalized weakness, shortness of breath, palpitation. She was found to have atrial fibrillation with RVR and started on cardizem ggt. Overnight, she has worsening respiratory failure requiring BiPAP. Also hypotensive requiring pressors. CT abdomen showed possible acute cholecystitis. Also DESIRAE with hyperkalemia. Subjective:  Patient on vent-mask, appears very lethargic, not providing much history.      Current Facility-Administered Medications   Medication Dose Route Frequency    NOREPINephrine (LEVOPHED) 8 mg in 0.9% NS 250ml infusion  0.5-30 mcg/min IntraVENous TITRATE    fentaNYL citrate (PF) injection 25 mcg  25 mcg IntraVENous Q1H PRN    piperacillin-tazobactam (ZOSYN) 4.5 g in 0.9% sodium chloride (MBP/ADV) 100 mL MBP  4.5 g IntraVENous ONCE    Followed by    piperacillin-tazobactam (ZOSYN) 3.375 g in 0.9% sodium chloride (MBP/ADV) 100 mL MBP  3.375 g IntraVENous Q12H    dilTIAZem (CARDIZEM) 125 mg in 0.9% sodium chloride 125 mL ([id number])  0-15 mg/hr IntraVENous TITRATE    dilTIAZem ER (CARDIZEM CD) capsule 240 mg  240 mg Oral BID    DULoxetine (CYMBALTA) capsule 60 mg  60 mg Oral DAILY    budesonide-formoteroL (SYMBICORT) 160-4.5 mcg/actuation HFA inhaler 1 Puff  1 Puff Inhalation BID RT    gabapentin (NEURONTIN) capsule 600 mg  600 mg Oral TID    hydrOXYchloroQUINE (PLAQUENIL) tablet 200 mg  200 mg Oral DAILY WITH BREAKFAST    levothyroxine (SYNTHROID) tablet 75 mcg  75 mcg Oral ACB    metoprolol tartrate (LOPRESSOR) tablet 50 mg  50 mg Oral BID    montelukast (SINGULAIR) tablet 10 mg  10 mg Oral DAILY    pantoprazole (PROTONIX) tablet 40 mg  40 mg Oral DAILY    atorvastatin (LIPITOR) tablet 10 mg  10 mg Oral DAILY    tiotropium bromide (SPIRIVA RESPIMAT) 2.5 mcg /actuation  2 Puff Inhalation DAILY    sodium chloride (NS) flush 5-40 mL  5-40 mL IntraVENous Q8H    sodium chloride (NS) flush 5-40 mL  5-40 mL IntraVENous PRN    acetaminophen (TYLENOL) tablet 650 mg  650 mg Oral Q6H PRN    Or    acetaminophen (TYLENOL) suppository 650 mg  650 mg Rectal Q6H PRN    ondansetron (ZOFRAN ODT) tablet 4 mg  4 mg Oral Q8H PRN    Or    ondansetron (ZOFRAN) injection 4 mg  4 mg IntraVENous Q6H PRN    promethazine (PHENERGAN) 12.5 mg in 0.9% sodium chloride 50 mL IVPB  12.5 mg IntraVENous Q4H PRN    methylPREDNISolone (PF) (SOLU-MEDROL) injection 40 mg  40 mg IntraVENous Q8H    albuterol CONCENTRATE 2.5mg/0.5 mL neb soln  2.5 mg Nebulization Q6H PRN    furosemide (LASIX) injection 40 mg  40 mg IntraVENous DAILY    verapamiL (CALAN) tablet 80 mg  80 mg Oral TID          Objective:     Visit Vitals  BP (!) 85/60   Pulse 83   Temp (!) 95.6 °F (35.3 °C)   Resp 29   Ht 5' 2\" (1.575 m)   Wt 57.9 kg (127 lb 10.3 oz)   SpO2 95%   BMI 23.35 kg/m²    O2 Flow Rate (L/min): (S) 3 l/min O2 Device: Nasal cannula    Temp (24hrs), Av.4 °F (36.3 °C), Min:95.6 °F (35.3 °C), Max:98.1 °F (36.7 °C)      No intake/output data recorded.  1901 -  0700  In: 919.6 [I.V.:919.6]  Out: 30 [Urine:30]    PHYSICAL EXAM:   Constitutional: In acute distress distress  Skin: Extremities and face reveal no rashes. HEENT: Omitted as patient is unable to cooperate. Cardiovascular: Regular rate and rhythm. Respiratory:  Clear b/l breath sound, no crackles or wheezing. GI: Abdomen nondistended, soft, and nontender. Normal active bowel sounds. Musculoskeletal: No pitting edema of the lower legs. Able to move all ext  Neurological:  lethargy. Omitted as patient is unable to cooperate. Psychiatric: Omitted as patient is unable to cooperate.         Data Review    Recent Results (from the past 24 hour(s))   CBC WITH AUTOMATED DIFF    Collection Time: 12/21/22  2:18 PM   Result Value Ref Range    WBC 6.2 3.6 - 11.0 K/uL    RBC 3.01 (L) 3.80 - 5.20 M/uL    HGB 8.7 (L) 11.5 - 16.0 g/dL    HCT 29.1 (L) 35.0 - 47.0 %    MCV 96.7 80.0 - 99.0 FL    MCH 28.9 26.0 - 34.0 PG    MCHC 29.9 (L) 30.0 - 36.5 g/dL    RDW 14.7 (H) 11.5 - 14.5 %    PLATELET 407 (H) 188 - 400 K/uL    MPV 9.6 8.9 - 12.9 FL    NRBC 0.0 0.0  WBC    ABSOLUTE NRBC 0.00 0.00 - 0.01 K/uL    NEUTROPHILS 75 32 - 75 %    LYMPHOCYTES 13 12 - 49 %    MONOCYTES 10 5 - 13 %    EOSINOPHILS 0 0 - 7 %    BASOPHILS 1 0 - 1 %    IMMATURE GRANULOCYTES 1 (H) 0 - 0.5 %    ABS. NEUTROPHILS 4.7 1.8 - 8.0 K/UL    ABS. LYMPHOCYTES 0.8 0.8 - 3.5 K/UL    ABS. MONOCYTES 0.6 0.0 - 1.0 K/UL    ABS. EOSINOPHILS 0.0 0.0 - 0.4 K/UL    ABS. BASOPHILS 0.0 0.0 - 0.1 K/UL    ABS. IMM. GRANS. 0.0 0.00 - 0.04 K/UL    DF AUTOMATED     METABOLIC PANEL, COMPREHENSIVE    Collection Time: 12/21/22  2:18 PM   Result Value Ref Range    Sodium 131 (L) 136 - 145 mmol/L    Potassium Hemolysed specimen 3.5 - 5.1 mmol/L    Chloride 89 (L) 97 - 108 mmol/L    CO2 37 (H) 21 - 32 mmol/L    Anion gap 5 5 - 15 mmol/L    Glucose 213 (H) 65 - 100 mg/dL    BUN 15 6 - 20 mg/dL    Creatinine 1.18 (H) 0.55 - 1.02 mg/dL    BUN/Creatinine ratio 13 12 - 20      eGFR 49 (L) >60 ml/min/1.73m2    Calcium 9.5 8.5 - 10.1 mg/dL    Bilirubin, total 0.3 0.2 - 1.0 mg/dL    AST (SGOT) Hemolysed specimen 15 - 37 U/L    ALT (SGPT) 30 12 - 78 U/L    Alk.  phosphatase 79 45 - 117 U/L    Protein, total 6.6 6.4 - 8.2 g/dL    Albumin 3.5 3.5 - 5.0 g/dL    Globulin 3.1 2.0 - 4.0 g/dL    A-G Ratio 1.1 1.1 - 2.2     NT-PRO BNP    Collection Time: 12/21/22  2:18 PM   Result Value Ref Range    NT pro-BNP 4,550 (H) <125 pg/mL   TROPONIN-HIGH SENSITIVITY    Collection Time: 12/21/22  2:18 PM   Result Value Ref Range    Troponin-High Sensitivity 43 0 - 51 ng/L   MAGNESIUM    Collection Time: 12/21/22  2:18 PM   Result Value Ref Range    Magnesium Hemolysed specimen 1.6 - 2.4 mg/dL CULTURE, BLOOD, PAIRED    Collection Time: 12/21/22  2:18 PM    Specimen: Blood   Result Value Ref Range    Special Requests: No Special Requests      Culture result: No growth after 18 hours     PROCALCITONIN    Collection Time: 12/21/22  2:18 PM   Result Value Ref Range    Procalcitonin <0.05 (H) 0 ng/mL   MRSA SCREEN - PCR (NASAL)    Collection Time: 12/21/22 11:45 PM   Result Value Ref Range    MRSA by PCR, Nasal Not Detected Not Detected     URINALYSIS W/ RFLX MICROSCOPIC    Collection Time: 12/22/22  2:45 AM   Result Value Ref Range    Color Yellow      Appearance Clear Clear      Specific gravity 1.025 1.003 - 1.030      pH (UA) 5.0 5.0 - 8.0      Protein Trace (A) Negative mg/dL    Glucose Negative Negative mg/dL    Ketone Negative Negative mg/dL    Bilirubin Negative Negative      Blood Negative Negative      Urobilinogen Normal 0.1 - 1.0 EU/dL    Nitrites Negative Negative      Leukocyte Esterase Negative Negative      WBC 0-4 0 - 4 /hpf    RBC 0-5 0 - 5 /hpf    Bacteria Negative Negative /hpf   METABOLIC PANEL, BASIC    Collection Time: 12/22/22  5:40 AM   Result Value Ref Range    Sodium 130 (L) 136 - 145 mmol/L    Potassium 7.9 (HH) 3.5 - 5.1 mmol/L    Chloride 93 (L) 97 - 108 mmol/L    CO2 29 21 - 32 mmol/L    Anion gap 8 5 - 15 mmol/L    Glucose 160 (H) 65 - 100 mg/dL    BUN 23 (H) 6 - 20 mg/dL    Creatinine 2.15 (H) 0.55 - 1.02 mg/dL    BUN/Creatinine ratio 11 (L) 12 - 20      eGFR 24 (L) >60 ml/min/1.73m2    Calcium 9.2 8.5 - 10.1 mg/dL   CBC W/O DIFF    Collection Time: 12/22/22  5:40 AM   Result Value Ref Range    WBC 6.2 3.6 - 11.0 K/uL    RBC 3.18 (L) 3.80 - 5.20 M/uL    HGB 9.3 (L) 11.5 - 16.0 g/dL    HCT 31.3 (L) 35.0 - 47.0 %    MCV 98.4 80.0 - 99.0 FL    MCH 29.2 26.0 - 34.0 PG    MCHC 29.7 (L) 30.0 - 36.5 g/dL    RDW 14.7 (H) 11.5 - 14.5 %    PLATELET 598 (H) 231 - 400 K/uL    MPV 9.4 8.9 - 12.9 FL    NRBC 1.6 (H) 0.0  WBC    ABSOLUTE NRBC 0.10 (H) 0.00 - 0.01 K/uL   BLOOD GAS, ARTERIAL    Collection Time: 12/22/22  7:02 AM   Result Value Ref Range    pH 7.13 (LL) 7.35 - 7.45      PCO2 78 (H) 35 - 45 mmHg    PO2 138 (H) 80 - 100 mmHg    O2 SATURATION 98 95 - 99 %    BICARBONATE 26 22 - 26 mmol/L    BASE DEFICIT 5.2 mmol/L    O2 METHOD VENTURI MASK      FIO2 50.0 %    Sample source Arterial      SITE Right Radial      GUMARO'S TEST YES      Carboxy-Hgb 0.3 (L) 1 - 2 %    Methemoglobin 0.2 0 - 1.4 %    Oxyhemoglobin 97.6 95 - 99 %    Performed by Savannah Hashimoto     Critical value read back Called to ICU RN  on 12/22/2022 at 07:05     TEMPERATURE 96.0     BLOOD GAS, ARTERIAL    Collection Time: 12/22/22 10:10 AM   Result Value Ref Range    pH 7.12 (LL) 7.35 - 7.45      PCO2 67 (H) 35 - 45 mmHg    PO2 67 (L) 80 - 100 mmHg    O2 SATURATION 90 (L) 95 - 99 %    BICARBONATE 22 22 - 26 mmol/L    BASE DEFICIT 8.2 mmol/L    O2 METHOD BiPAP      FIO2 32 %    MODE BiPAP      SET RATE 18      IPAP/PIP 16      PEEP/CPAP 6.0      Sample source Arterial      SITE Right Brachial      GUMARO'S TEST NOT APPLICABLE      Carboxy-Hgb 0.1 (L) 1 - 2 %    Methemoglobin 0.2 0 - 1.4 %    Oxyhemoglobin 89.4 (L) 95 - 99 %    Performed by Savannah Hashimoto     Critical value read back       Called to [facility] RN on 12/22/2022 at 10:13    TEMPERATURE 96.2             Assessment and Plan:     # Atrial fibrillation  with RVR  - Rate controlled currently. Discontinue cardizem ggt per cardiology   - Continue to monitor with telemetry. # COPD exacerbation   - Worsening today requiring ventimask.   - Pulmonary consulted: Recommended ABG and chest X-ray in AM. Will need intubation if continue to worsened. - Continue symbicort and spiriva and nebulized treatment. # Acute respiratory failure with hypoxia  - Secondary to COPD exacerbation   - Continue oxygen supplementation, wean as tolerated  - Management as above.      # Septic shock  - Suspect septic shock secondary to cholecystitis  - Blood culture sent   - Zosyn started. Given 1 dose of vancomycin  - Consult ID for assistance. # Acute cholecystitis  - IV zosyn   - Surgery consulted. Dr. John Shearer recommended IR percutaneous cholecystectomy given patient not a candidate of surgical laparoscopic cholecystectomy. # DESIRAE  - Worsening   - Consult nephrology     # Hyperkalemia  - Nephrology consulted: Given insulin with D10.  - Continue to have hyperkalemia throughout the day, nephrology recommended hemodialysis      CODE STATUS: Full code     DVT prophylaxis: Compression stockings      Care Plan discussed with: Nurse    I spent 40 minutes critical care time attending to this patient, including direct patient care, coordination of care with nurses, reviewing charts, imaging and results.

## 2022-12-22 NOTE — PROGRESS NOTES
Spoke with pt's daughter, Ramesh Ford, to update on pt condition. Discussed pt home medications, including  and medications not currently taking. Ramesh Ford requested for pharmacy to safely dispose of these medications. Daughter to  pt home medications when she returns.

## 2022-12-22 NOTE — CONSULTS
NEPHROLOGY CONSULT NOTE     Patient: Carole Clinton MRN: [id number]  PCP: Marcos Covarrubias DO   :     1950  Age:   67 y.o. Sex:  female      Referring physician: Rizwana Nunes MD  Reason for consultation: 67 y.o. female with Atrial fibrillation with RVR ([facility] [F71.73] complicated by DESIRAE   Admission Date: 2022  1:51 PM  LOS: 1 day     DISCUSSION / PLAN :     DESIRAE due to renal hypoperfusion insetting of AFIB/RVR and hypotension and possible sepsis-other Ddx includes renal artery embolism which is less likely  -does not look fluid overload, elevated BNP might be due to pulm HTN. -C/w IVF  -repeat CXR  -negrete  -repeat BMP stat  -check uric acid, CPK, urine lytes JOCELYN  -f/up  renal function    Hyperkalemia,   -s/p kayexalate, IV glucose and insulin  -repeat BMP stat    Septic shock? Possible acute cholecystitis  -IN levo, Abx    Acute on chronic hypoxic and hypercapnic resp failure-on BIPAP  Severe COPD    Anemia     Active Problems / Assessment AAActive  : Active Problems:    Atrial fibrillation with RVR ([facility] (2022)         Subjective:   HPI: Carole Clinton is a 67 y.o.  female who has been admitted to the hospital for SOB. She has h/o severe COPD on home O2 and Afib who presented with afib/RVR and complaint of SOB. Has had recent increase in edema. Was in hosp in nov with rapid RVR. Cr was 0.7 in Nov and 1.2 on admission with K 5.3 and mild hyponatremia. Her BP primarily was ok in ER but soft and started on IVF and cardizem . Dropped her BP and started on levo. Her UO has been low, labs this am showed K 7.9 with no EKG chnages and cr 2.1. She has had 30 ml UO. She has h/o Afib and apparently not on anticoagulation due to h/o GIB . She has been seen by cardiology and cardizem drip stopped due to hypotension, on Digoxin and rate controlled. Patient is not on ACEi/ARB, denies taking NSAIDs. No IV contrast exposure.  CT abd/pelvis w/o contrast showed acute cholecystitis and non-obstructing Rt kidney stone, BL pleural effusion and atelectasis. She is on zosyn . No lactic acid pending. UA w/o infection, SG 1025. ABG with resp acidosis and no metabolic compensation. Currently on BIPAP. BNP 4500 and troponin nl. Procalcitonin elevated. Echo from Nov 2022 shows EF 50-55%, RVSP 47. She has some abd pain, no N/V/Diarrhea. No urinary symptoms. Has had low appetite. Past Medical Hx:   Past Medical History:   Diagnosis Date    Anxiety     Arrhythmia     svt    Calculus of kidney     Chronic atrial fibrillation (HCC)     COPD     emphysema    Depression     GERD (gastroesophageal reflux disease)     Hypothyroidism     Sciatica     Thyroid disease         Past Surgical Hx:     Past Surgical History:   Procedure Laterality Date    HX BREAST BIOPSY Right 2010    HX GYN  11/05    LEEP  for CIN2    HX ORTHOPAEDIC      left leg    UT BREAST SURGERY PROCEDURE UNLISTED  2007    biopsy        Medications:  Prior to Admission medications    Medication Sig Start Date End Date Taking? Authorizing Provider   tiotropium bromide (SPIRIVA RESPIMAT) 2.5 mcg/actuation inhaler Take 2 Puffs by inhalation daily. 12/1/22  Yes Cass Knott MD   gabapentin (NEURONTIN) 600 mg tablet Take 600 mg by mouth three (3) times daily. Yes Provider, Historical   dilTIAZem ER (CARDIZEM CD) 240 mg capsule Take 1 Capsule by mouth two (2) times a day. 11/30/22   Sharon Flores MD   levothyroxine (SYNTHROID) 75 mcg tablet Take 1 Tablet by mouth Daily (before breakfast). 12/1/22   Sharon Flores MD   apixaban (ELIQUIS) 5 mg tablet Take 1 Tablet by mouth two (2) times a day. 11/30/22   Sharon Flores MD   DULoxetine (CYMBALTA) 60 mg capsule Take 1 Capsule by mouth daily. 12/1/22   Sharon Flores MD   hydrOXYchloroQUINE (PLAQUENIL) 200 mg tablet Take 1 Tablet by mouth daily (with breakfast).  12/1/22   Sharon Flores MD   metoprolol tartrate (LOPRESSOR) 50 mg tablet Take 1 Tablet by mouth two (2) times a day. 11/30/22   Yesica Flores MD   verapamiL (CALAN) 120 mg tablet Take 1 Tablet by mouth three (3) times daily. 11/30/22   Yesica Flores MD   doxycycline (MONODOX) 100 mg capsule Take 1 Capsule by mouth two (2) times a day. 11/30/22   Yesica Flores MD   predniSONE (DELTASONE) 10 mg tablet 2 tablet daily 11/30/22   Yesica Flores MD   hydrOXYzine HCL (ATARAX) 25 mg tablet Take 25 mg by mouth three (3) times daily as needed. Provider, Historical   pantoprazole (PROTONIX) 40 mg tablet Take 40 mg by mouth daily. Provider, Historical   pravastatin (PRAVACHOL) 40 mg tablet Take 40 mg by mouth daily. 7/2/18   Provider, Historical   montelukast (SINGULAIR) 10 mg tablet Take 10 mg by mouth daily. Provider, Historical   fluticasone-salmeterol (ADVAIR DISKUS) 250-50 mcg/Dose diskus inhaler Take 1 Puff by inhalation every twelve (12) hours. Provider, Historical   IPRATROPIUM/ALBUTEROL SULFATE (COMBIVENT IN) Take 14.7 g by inhalation four (4) times daily. 10/22/10   Provider, Historical   calcium-vitamin D (OYSTER SHELL) 500 mg(1,250mg) -200 unit per tablet Take 1 Tab by mouth two (2) times daily (with meals). Provider, Historical       Allergies   Allergen Reactions    Nsaids (Non-Steroidal Anti-Inflammatory Drug) Unknown (comments)    Sulfa (Sulfonamide Antibiotics) Rash       Social Hx:  reports that she quit smoking about 19 years ago. She has never used smokeless tobacco. She reports that she does not drink alcohol and does not use drugs. Family History   Problem Relation Age of Onset    Malignant Hyperthermia Neg Hx     Pseudocholinesterase Deficiency Neg Hx     Delayed Awakening Neg Hx     Post-op Nausea/Vomiting Neg Hx     Emergence Delirium Neg Hx     Post-op Cognitive Dysfunction Neg Hx     Other Neg Hx        Review of Systems:  A twelve point review of system was performed today. Pertinent positives and negatives are mentioned in the HPI.  The reminder of the ROS is negative and noncontributory. Objective:    Vitals:    Vitals:    12/22/22 0547 12/22/22 0552 12/22/22 0617 12/22/22 0804   BP: (!) 116/92 (!) 66/37 (!) 85/60    Pulse: 80 82 83    Resp: 23 24 29    Temp:    (!) 95.6 °F (35.3 °C)   SpO2:   95%    Weight:       Height:         I&O's:  12/21 0701 - 12/22 0700  In: 919.6 [I.V.:919.6]  Out: 30 [Urine:30]  Visit Vitals  BP (!) 85/60   Pulse 83   Temp (!) 95.6 °F (35.3 °C)   Resp 29   Ht 5' 2\" (1.575 m)   Wt 57.9 kg (127 lb 10.3 oz)   SpO2 95%   BMI 23.35 kg/m²       Physical Exam:  General:Alert, No distress, on BIPAP  HEENT: Conjunctiva without pallor ,erythema. The sclerae without icterus. .   Neck:Supple, JVD+  Lungs :few crackles BB,  Normal respiratory effort  CVS: S1 S2 normal, No rub, no LE edema  Abdomen: Soft, mild tenderness, No hepatosplenomegaly, bowel sounds present  Extremities: No cyanosis, No clubbing  Skin: No rash   Neurologic: non focal, AAO x 3  Psych: normal affect    Laboratory Results:    Recent Results (from the past 24 hour(s))   CBC WITH AUTOMATED DIFF    Collection Time: 12/21/22  2:18 PM   Result Value Ref Range    WBC 6.2 3.6 - 11.0 K/uL    RBC 3.01 (L) 3.80 - 5.20 M/uL    HGB 8.7 (L) 11.5 - 16.0 g/dL    HCT 29.1 (L) 35.0 - 47.0 %    MCV 96.7 80.0 - 99.0 FL    MCH 28.9 26.0 - 34.0 PG    MCHC 29.9 (L) 30.0 - 36.5 g/dL    RDW 14.7 (H) 11.5 - 14.5 %    PLATELET 283 (H) 957 - 400 K/uL    MPV 9.6 8.9 - 12.9 FL    NRBC 0.0 0.0  WBC    ABSOLUTE NRBC 0.00 0.00 - 0.01 K/uL    NEUTROPHILS 75 32 - 75 %    LYMPHOCYTES 13 12 - 49 %    MONOCYTES 10 5 - 13 %    EOSINOPHILS 0 0 - 7 %    BASOPHILS 1 0 - 1 %    IMMATURE GRANULOCYTES 1 (H) 0 - 0.5 %    ABS. NEUTROPHILS 4.7 1.8 - 8.0 K/UL    ABS. LYMPHOCYTES 0.8 0.8 - 3.5 K/UL    ABS. MONOCYTES 0.6 0.0 - 1.0 K/UL    ABS. EOSINOPHILS 0.0 0.0 - 0.4 K/UL    ABS. BASOPHILS 0.0 0.0 - 0.1 K/UL    ABS. IMM.  GRANS. 0.0 0.00 - 0.04 K/UL    DF AUTOMATED     METABOLIC PANEL, COMPREHENSIVE    Collection Time: 12/21/22  2:18 PM   Result Value Ref Range    Sodium 131 (L) 136 - 145 mmol/L    Potassium Hemolysed specimen 3.5 - 5.1 mmol/L    Chloride 89 (L) 97 - 108 mmol/L    CO2 37 (H) 21 - 32 mmol/L    Anion gap 5 5 - 15 mmol/L    Glucose 213 (H) 65 - 100 mg/dL    BUN 15 6 - 20 mg/dL    Creatinine 1.18 (H) 0.55 - 1.02 mg/dL    BUN/Creatinine ratio 13 12 - 20      eGFR 49 (L) >60 ml/min/1.73m2    Calcium 9.5 8.5 - 10.1 mg/dL    Bilirubin, total 0.3 0.2 - 1.0 mg/dL    AST (SGOT) Hemolysed specimen 15 - 37 U/L    ALT (SGPT) 30 12 - 78 U/L    Alk.  phosphatase 79 45 - 117 U/L    Protein, total 6.6 6.4 - 8.2 g/dL    Albumin 3.5 3.5 - 5.0 g/dL    Globulin 3.1 2.0 - 4.0 g/dL    A-G Ratio 1.1 1.1 - 2.2     NT-PRO BNP    Collection Time: 12/21/22  2:18 PM   Result Value Ref Range    NT pro-BNP 4,550 (H) <125 pg/mL   TROPONIN-HIGH SENSITIVITY    Collection Time: 12/21/22  2:18 PM   Result Value Ref Range    Troponin-High Sensitivity 43 0 - 51 ng/L   MAGNESIUM    Collection Time: 12/21/22  2:18 PM   Result Value Ref Range    Magnesium Hemolysed specimen 1.6 - 2.4 mg/dL   CULTURE, BLOOD, PAIRED    Collection Time: 12/21/22  2:18 PM    Specimen: Blood   Result Value Ref Range    Special Requests: No Special Requests      Culture result: No growth after 18 hours     PROCALCITONIN    Collection Time: 12/21/22  2:18 PM   Result Value Ref Range    Procalcitonin <0.05 (H) 0 ng/mL   MRSA SCREEN - PCR (NASAL)    Collection Time: 12/21/22 11:45 PM   Result Value Ref Range    MRSA by PCR, Nasal Not Detected Not Detected     URINALYSIS W/ RFLX MICROSCOPIC    Collection Time: 12/22/22  2:45 AM   Result Value Ref Range    Color Yellow      Appearance Clear Clear      Specific gravity 1.025 1.003 - 1.030      pH (UA) 5.0 5.0 - 8.0      Protein Trace (A) Negative mg/dL    Glucose Negative Negative mg/dL    Ketone Negative Negative mg/dL    Bilirubin Negative Negative      Blood Negative Negative      Urobilinogen Normal 0.1 - 1.0 EU/dL    Nitrites Negative Negative Leukocyte Esterase Negative Negative      WBC 0-4 0 - 4 /hpf    RBC 0-5 0 - 5 /hpf    Bacteria Negative Negative /hpf   METABOLIC PANEL, BASIC    Collection Time: 12/22/22  5:40 AM   Result Value Ref Range    Sodium 130 (L) 136 - 145 mmol/L    Potassium 7.9 (HH) 3.5 - 5.1 mmol/L    Chloride 93 (L) 97 - 108 mmol/L    CO2 29 21 - 32 mmol/L    Anion gap 8 5 - 15 mmol/L    Glucose 160 (H) 65 - 100 mg/dL    BUN 23 (H) 6 - 20 mg/dL    Creatinine 2.15 (H) 0.55 - 1.02 mg/dL    BUN/Creatinine ratio 11 (L) 12 - 20      eGFR 24 (L) >60 ml/min/1.73m2    Calcium 9.2 8.5 - 10.1 mg/dL   CBC W/O DIFF    Collection Time: 12/22/22  5:40 AM   Result Value Ref Range    WBC 6.2 3.6 - 11.0 K/uL    RBC 3.18 (L) 3.80 - 5.20 M/uL    HGB 9.3 (L) 11.5 - 16.0 g/dL    HCT 31.3 (L) 35.0 - 47.0 %    MCV 98.4 80.0 - 99.0 FL    MCH 29.2 26.0 - 34.0 PG    MCHC 29.7 (L) 30.0 - 36.5 g/dL    RDW 14.7 (H) 11.5 - 14.5 %    PLATELET 584 (H) 756 - 400 K/uL    MPV 9.4 8.9 - 12.9 FL    NRBC 1.6 (H) 0.0  WBC    ABSOLUTE NRBC 0.10 (H) 0.00 - 0.01 K/uL   BLOOD GAS, ARTERIAL    Collection Time: 12/22/22  7:02 AM   Result Value Ref Range    pH 7.13 (LL) 7.35 - 7.45      PCO2 78 (H) 35 - 45 mmHg    PO2 138 (H) 80 - 100 mmHg    O2 SATURATION 98 95 - 99 %    BICARBONATE 26 22 - 26 mmol/L    BASE DEFICIT 5.2 mmol/L    O2 METHOD VENTURI MASK      FIO2 50.0 %    Sample source Arterial      SITE Right Radial      GUMARO'S TEST YES      Carboxy-Hgb 0.3 (L) 1 - 2 %    Methemoglobin 0.2 0 - 1.4 %    Oxyhemoglobin 97.6 95 - 99 %    Performed by Clinton Torres     Critical value read back Called to ICU RN  on 12/22/2022 at 07:05     TEMPERATURE 96.0     BLOOD GAS, ARTERIAL    Collection Time: 12/22/22 10:10 AM   Result Value Ref Range    pH 7.12 (LL) 7.35 - 7.45      PCO2 67 (H) 35 - 45 mmHg    PO2 67 (L) 80 - 100 mmHg    O2 SATURATION 90 (L) 95 - 99 %    BICARBONATE 22 22 - 26 mmol/L    BASE DEFICIT 8.2 mmol/L    O2 METHOD BiPAP      FIO2 32 %    MODE BiPAP SET RATE 18      IPAP/PIP 16      PEEP/CPAP 6.0      Sample source Arterial      SITE Right Brachial      GUMARO'S TEST NOT APPLICABLE      Carboxy-Hgb 0.1 (L) 1 - 2 %    Methemoglobin 0.2 0 - 1.4 %    Oxyhemoglobin 89.4 (L) 95 - 99 %    Performed by Carri Rogers     Critical value read back       Called to [employer] RN on 12/22/2022 at 10:13    TEMPERATURE 96.2          Lab Results   Component Value Date    BUN 23 (H) 12/22/2022     (L) 12/22/2022    K 7.9 (HH) 12/22/2022    CL 93 (L) 12/22/2022    CO2 29 12/22/2022       Lab Results   Component Value Date    BUN 23 (H) 12/22/2022    BUN 15 12/21/2022    BUN 28 (H) 11/30/2022    BUN 29 (H) 11/29/2022    BUN 19 11/26/2022    K 7.9 (HH) 12/22/2022    K Hemolysed specimen 12/21/2022    K 5.3 (H) 11/30/2022    K 5.1 11/29/2022    K 4.8 11/26/2022       Lab Results   Component Value Date    WBC 6.2 12/22/2022    RBC 3.18 (L) 12/22/2022    HGB 9.3 (L) 12/22/2022    HCT 31.3 (L) 12/22/2022    MCV 98.4 12/22/2022    MCH 29.2 12/22/2022    RDW 14.7 (H) 12/22/2022     (H) 12/22/2022       No results found for: PTH, PHOS    Urine dipstick:   Lab Results   Component Value Date/Time    Color Yellow 12/22/2022 02:45 AM    Appearance Clear 12/22/2022 02:45 AM    Specific gravity 1.025 12/22/2022 02:45 AM    Specific gravity 1.005 11/22/2022 04:12 PM    pH (UA) 5.0 12/22/2022 02:45 AM    Protein Trace (A) 12/22/2022 02:45 AM    Glucose Negative 12/22/2022 02:45 AM    Ketone Negative 12/22/2022 02:45 AM    Bilirubin Negative 12/22/2022 02:45 AM    Urobilinogen Normal 12/22/2022 02:45 AM    Nitrites Negative 12/22/2022 02:45 AM    Leukocyte Esterase Negative 12/22/2022 02:45 AM    Bacteria Negative 12/22/2022 02:45 AM    WBC 0-4 12/22/2022 02:45 AM    RBC 0-5 12/22/2022 02:45 AM       I have reviewed the following:    All pertinent labs, microbiology data, radiology imaging for my assessment     ECG- Rev: Yes / No  Xray/CT/US/MRI REV: Yes/ No    Care Plan discussed with:  patient , family, ICU nurse      Chart reviewed. Total time spent with patient including DAV  45 min  Medications list Personally Reviewed   [x]      Yes     []               No      Thank you for allowing us to participate in the care of this patient. We will follow patient.  Please dont hesitate to call with any questions    Chuy Charles MD  12/22/2022    [id number] [facility]

## 2022-12-22 NOTE — PROGRESS NOTES
Patient anuric, K repeat is 7, renal function is worsening. Lactic acid is higher. She is septic. Will give her another 500 ml IVF bolus. Patient needs HD. She already had HD catheter placed and agrred with HD this morning if she needs it. I spoke to her daughter Chad Saunders ([phone]) also who agrees  with hemodilaysis. Will do HD stat with low K bath, no UF.  Will give lokelma while waiting for HD  ABG noted, Change IVF to bicarb fluid  Bicarbx1 amp  HD notified about stat HD

## 2022-12-23 NOTE — PROGRESS NOTES
Dr. Gonzalez Favors paged via [employer] at 0934 requesting to check stat CBC and BMP, ionized calcium, and A-line. Discussed lactic acid down to 7.6 from 11's. Next lactic acid due 0200. Awaiting call back.

## 2022-12-23 NOTE — PROGRESS NOTES
Client desatted into 80-84%. ABG's drawn. Results received from RT. Paged Dr. Ruben Velasquez, RT, increased rate to 22 from 18, Pi increased to 28 to 26,  achieved.

## 2022-12-23 NOTE — PROGRESS NOTES
Abg and ionized calcium on a/c 16/ vt 450ml/ fio2 40%/ peep 5cm h2o,   pH 7.24/ PACO2 39/ PAO2 112/ HCO3 16/ BD 10.6/ O2 SAT 97%, FIO2 DECREASED TO 35%

## 2022-12-23 NOTE — PROGRESS NOTES
Spoke with Arely Tirado with [employer], notified of time of death.  Pt has been released for eye and tissue donation due to sepsis

## 2022-12-23 NOTE — PROGRESS NOTES
Family at bedside. Multiple physicians including Dr. Johny Bautista and Dr. Kareem Romero at bedside. Pt code status changed by family to DNR. Pt. spontaneously went into asystole. Time of death 36. [employer] notified of time of death. Nursing supervisor notified.

## 2022-12-23 NOTE — H&P
Hospitalist Progress Note    Subjective:   Daily Progress Note: 12/23/2022 10:17 AM    Brief HPI/ hospital course:  Marvin Matos is a 67 y.o. female with PMH of atrial fibrillation, SVT, COPD on home 3L NC and other medical problem as below. She presented to the ED with chief complaint of generalized weakness, shortness of breath, palpitation. She was found to have atrial fibrillation with RVR and started on cardizem ggt. Overnight, she has worsening respiratory failure requiring BiPAP. Also hypotensive requiring pressors. CT abdomen showed possible acute cholecystitis. Also DESIRAE with hyperkalemia. Patient underwent hemodialysis last night. Subsequently requiring higher dose of pressors     Subjective:  Patient intubated overnight. Noticed firm abdomen, however too unstable for laparoscopic surgery or IR percutaneous cholecystectomy. Currently on NE, clint, vaso and epinephrine ggt. Also on bicarb ggt. Talked to daughter, who has been contact with her brother through phone. Discussed poor prognosis and discuss code status. Daughter verbalized wanted her to be DNR.      Current Facility-Administered Medications   Medication Dose Route Frequency    hydrocortisone Sod Succ (PF) (SOLU-CORTEF) injection 100 mg  100 mg IntraVENous Q6H    0.9% sodium chloride infusion 250 mL  250 mL IntraVENous PRN    bicarbonate dialysis (PRISMASOL) BG K 2/Ca 3.5 5000 ml solution   Extracorporeal DIALYSIS CONTINUOUS    digoxin (LANOXIN) injection 250 mcg  250 mcg IntraVENous NOW    sodium bicarbonate 8.4 % (1 mEq/mL) injection        piperacillin-tazobactam (ZOSYN) 3.375 g in 0.9% sodium chloride (MBP/ADV) 100 mL MBP  3.375 g IntraVENous Q8H    NOREPINephrine (LEVOPHED) 8 mg in 0.9% NS 250ml infusion  0.5-30 mcg/min IntraVENous TITRATE    fentaNYL citrate (PF) injection 25 mcg  25 mcg IntraVENous Q1H PRN    digoxin (LANOXIN) injection 250 mcg  250 mcg IntraVENous DAILY    albuterol-ipratropium (DUO-NEB) 2.5 MG-0.5 MG/3 ML  3 mL Nebulization Q6H PRN    [Held by provider] gabapentin (NEURONTIN) capsule 300 mg  300 mg Oral QHS    dexmedeTOMidine in 0.9 % NaCl (PRECEDEX) 400 mcg/100 mL (4 mcg/mL) infusion soln  0.1-1.5 mcg/kg/hr IntraVENous TITRATE    DULoxetine (CYMBALTA) capsule 20 mg  20 mg Oral DAILY    sodium bicarbonate (8.4%) 150 mEq in dextrose 5% 1,000 mL infusion   IntraVENous CONTINUOUS    albumin human 25% (BUMINATE) solution 25 g  25 g IntraVENous DIALYSIS PRN    vasopressin (VASOSTRICT) 20 Units in 0.9% sodium chloride 100 mL infusion  0.01-0.03 Units/min IntraVENous TITRATE    PHENYLephrine (REG-SYNEPHRINE) 30 mg in 0.9% sodium chloride 250 mL infusion   mcg/min IntraVENous TITRATE    heparin (porcine) 1,000 unit/mL injection 2,500 Units  2,500 Units Hemodialysis DIALYSIS PRN    fentaNYL (PF) 1,500 mcg/30 mL (50 mcg/mL) infusion  0-200 mcg/hr IntraVENous TITRATE    budesonide (PULMICORT) 500 mcg/2 ml nebulizer suspension  500 mcg Nebulization BID RT    EPINEPHrine (ADRENALIN) 5 mg in 0.9% sodium chloride 250 mL infusion  0-10 mcg/min IntraVENous TITRATE    dextrose 10% infusion 0-250 mL  0-250 mL IntraVENous PRN    dilTIAZem (CARDIZEM) 125 mg in 0.9% sodium chloride 125 mL ([id number])  0-15 mg/hr IntraVENous TITRATE    [Held by provider] dilTIAZem ER (CARDIZEM CD) capsule 240 mg  240 mg Oral BID    [Held by provider] budesonide-formoteroL (SYMBICORT) 160-4.5 mcg/actuation HFA inhaler 1 Puff  1 Puff Inhalation BID RT    hydrOXYchloroQUINE (PLAQUENIL) tablet 200 mg  200 mg Oral DAILY WITH BREAKFAST    levothyroxine (SYNTHROID) tablet 75 mcg  75 mcg Oral ACB    [Held by provider] metoprolol tartrate (LOPRESSOR) tablet 50 mg  50 mg Oral BID    [Held by provider] montelukast (SINGULAIR) tablet 10 mg  10 mg Oral DAILY    pantoprazole (PROTONIX) tablet 40 mg  40 mg Oral DAILY    atorvastatin (LIPITOR) tablet 10 mg  10 mg Oral DAILY    tiotropium bromide (SPIRIVA RESPIMAT) 2.5 mcg /actuation  2 Puff Inhalation DAILY    sodium chloride (NS) flush 5-40 mL  5-40 mL IntraVENous Q8H    sodium chloride (NS) flush 5-40 mL  5-40 mL IntraVENous PRN    acetaminophen (TYLENOL) tablet 650 mg  650 mg Oral Q6H PRN    Or    acetaminophen (TYLENOL) suppository 650 mg  650 mg Rectal Q6H PRN    ondansetron (ZOFRAN ODT) tablet 4 mg  4 mg Oral Q8H PRN    Or    ondansetron (ZOFRAN) injection 4 mg  4 mg IntraVENous Q6H PRN    promethazine (PHENERGAN) 12.5 mg in 0.9% sodium chloride 50 mL IVPB  12.5 mg IntraVENous Q4H PRN    [Held by provider] methylPREDNISolone (PF) (SOLU-MEDROL) injection 40 mg  40 mg IntraVENous Q8H    [Held by provider] furosemide (LASIX) injection 40 mg  40 mg IntraVENous DAILY    [Held by provider] verapamiL (CALAN) tablet 80 mg  80 mg Oral TID          Objective:     Visit Vitals  /74   Pulse 70   Temp 98.5 °F (36.9 °C)   Resp 16   Ht 5' 2\" (1.575 m)   Wt 64.5 kg (142 lb 3.2 oz)   SpO2 (!) 83%   BMI 26.01 kg/m²    O2 Flow Rate (L/min): 3 l/min O2 Device: Ventilator    Temp (24hrs), Av.3 °F (36.8 °C), Min:94.8 °F (34.9 °C), Max:100 °F (37.8 °C)      No intake/output data recorded.  1901 -  0700  In: 7255.5 [P.O.:400; I.V.:6835.5]  Out: 0 [Urine:30]    PHYSICAL EXAM:   Constitutional: intubated and sedated  Skin: modeling   HEENT: Omitted as patient is unable to cooperate. Cardiovascular: Regular rate and rhythm. Respiratory:  Clear b/l breath sound, no crackles or wheezing. GI: Abdomen distended and tense. Musculoskeletal: Omitted as patient is unable to cooperate. Neurological:  sedated. Omitted as patient is unable to cooperate. Psychiatric: Omitted as patient is unable to cooperate.         Data Review    Recent Results (from the past 24 hour(s))   BLOOD GAS, ARTERIAL    Collection Time: 22 10:10 AM   Result Value Ref Range    pH 7.12 (LL) 7.35 - 7.45      PCO2 67 (H) 35 - 45 mmHg    PO2 67 (L) 80 - 100 mmHg    O2 SATURATION 90 (L) 95 - 99 %    BICARBONATE 22 22 - 26 mmol/L    BASE DEFICIT 8.2 mmol/L    O2 METHOD BiPAP      FIO2 32 %    MODE BiPAP      SET RATE 18      IPAP/PIP 16      PEEP/CPAP 6.0      Sample source Arterial      SITE Right Brachial      GUMARO'S TEST NOT APPLICABLE      Carboxy-Hgb 0.1 (L) 1 - 2 %    Methemoglobin 0.2 0 - 1.4 %    Oxyhemoglobin 89.4 (L) 95 - 99 %    Performed by Tal Evans     Critical value read back       Called to Rosa on 12/22/2022 at 10:13    TEMPERATURE 96.2     EKG, 12 LEAD, INITIAL    Collection Time: 12/22/22 10:38 AM   Result Value Ref Range    Ventricular Rate 99 BPM    Atrial Rate 78 BPM    QRS Duration 98 ms    Q-T Interval 460 ms    QTC Calculation (Bezet) 590 ms    Calculated R Axis 103 degrees    Calculated T Axis -21 degrees    Diagnosis       Atrial fibrillation  Incomplete right bundle branch block  Possible Lateral infarct , age undetermined  ST & T wave abnormality, consider anterior ischemia  Prolonged QT  Abnormal ECG  When compared with ECG of 21-DEC-2022 14:03, (Unconfirmed)  Vent.  rate has decreased BY  53 BPM  Non-specific change in ST segment in Lateral leads  T wave inversion now evident in Inferior leads  T wave inversion now evident in Anterior leads  Confirmed by [facility] MD, Jennifer Menjivar ([id number]) on 12/22/2022 1:30:59 PM     LACTIC ACID    Collection Time: 12/22/22 11:53 AM   Result Value Ref Range    Lactic acid 10.4 (HH) 0.4 - 2.0 mmol/L   BLOOD GAS, ARTERIAL    Collection Time: 12/22/22  1:04 PM   Result Value Ref Range    pH 7.20 (LL) 7.35 - 7.45      PCO2 53 (H) 35 - 45 mmHg    PO2 74 (L) 80 - 100 mmHg    O2 SATURATION 93 (L) 95 - 99 %    BICARBONATE 20 (L) 22 - 26 mmol/L    BASE DEFICIT 7.8 mmol/L    O2 METHOD BiPAP      FIO2 28 %    Tidal volume 400.0      SET RATE 18      PEEP/CPAP 6.0      Sample source Arterial      SITE Right Radial      GUMARO'S TEST YES      Carboxy-Hgb 0.2 (L) 1 - 2 %    Methemoglobin 0.2 0 - 1.4 %    Oxyhemoglobin 92.3 (L) 95 - 99 %    Performed by Tal Evans     Critical value read back Called to Dr. Andrea Brown  on 12/22/2022 at 13:29     TEMPERATURE 75.1     METABOLIC PANEL, BASIC    Collection Time: 12/22/22  2:56 PM   Result Value Ref Range    Sodium 132 (L) 136 - 145 mmol/L    Potassium 7.0 (HH) 3.5 - 5.1 mmol/L    Chloride 94 (L) 97 - 108 mmol/L    CO2 22 21 - 32 mmol/L    Anion gap 16 (H) 5 - 15 mmol/L    Glucose 71 65 - 100 mg/dL    BUN 26 (H) 6 - 20 mg/dL    Creatinine 2.48 (H) 0.55 - 1.02 mg/dL    BUN/Creatinine ratio 10 (L) 12 - 20      eGFR 20 (L) >60 ml/min/1.73m2    Calcium 7.9 (L) 8.5 - 10.1 mg/dL   RENAL FUNCTION PANEL    Collection Time: 12/22/22  2:56 PM   Result Value Ref Range    Sodium 131 (L) 136 - 145 mmol/L    Potassium 7.0 (HH) 3.5 - 5.1 mmol/L    Chloride 94 (L) 97 - 108 mmol/L    CO2 21 21 - 32 mmol/L    Anion gap 16 (H) 5 - 15 mmol/L    Glucose 71 65 - 100 mg/dL    BUN 25 (H) 6 - 20 mg/dL    Creatinine 2.55 (H) 0.55 - 1.02 mg/dL    BUN/Creatinine ratio 10 (L) 12 - 20      eGFR 19 (L) >60 ml/min/1.73m2    Calcium 7.8 (L) 8.5 - 10.1 mg/dL    Phosphorus 8.6 (H) 2.6 - 4.7 mg/dL    Albumin 2.7 (L) 3.5 - 5.0 g/dL   LACTIC ACID    Collection Time: 12/22/22  2:56 PM   Result Value Ref Range    Lactic acid 11.6 (HH) 0.4 - 2.0 mmol/L   PROCALCITONIN    Collection Time: 12/22/22  2:56 PM   Result Value Ref Range    Procalcitonin 0.62 (H) 0 ng/mL   CBC WITH AUTOMATED DIFF    Collection Time: 12/22/22  4:12 PM   Result Value Ref Range    WBC 6.4 3.6 - 11.0 K/uL    RBC 2.77 (L) 3.80 - 5.20 M/uL    HGB 8.0 (L) 11.5 - 16.0 g/dL    HCT 28.1 (L) 35.0 - 47.0 %    .4 (H) 80.0 - 99.0 FL    MCH 28.9 26.0 - 34.0 PG    MCHC 28.5 (L) 30.0 - 36.5 g/dL    RDW 14.9 (H) 11.5 - 14.5 %    PLATELET 390 760 - 957 K/uL    MPV 9.4 8.9 - 12.9 FL    NRBC 6.4 (H) 0.0  WBC    ABSOLUTE NRBC 0.41 (H) 0.00 - 0.01 K/uL    NEUTROPHILS 84 (H) 32 - 75 %    BAND NEUTROPHILS 2 0 - 6 %    LYMPHOCYTES 7 (L) 12 - 49 %    MONOCYTES 7 5 - 13 %    EOSINOPHILS 0 0 - 7 %    BASOPHILS 0 0 - 1 %    IMMATURE GRANULOCYTES 0 %    ABS. NEUTROPHILS 5.6 1.8 - 8.0 K/UL    ABS. LYMPHOCYTES 0.4 (L) 0.8 - 3.5 K/UL    ABS. MONOCYTES 0.4 0.0 - 1.0 K/UL    ABS. EOSINOPHILS 0.0 0.0 - 0.4 K/UL    ABS. BASOPHILS 0.0 0.0 - 0.1 K/UL    ABS. IMM.  GRANS. 0.0 K/UL    DF Manual      RBC COMMENTS Anisocytosis  1+        RBC COMMENTS Macrocytosis  1+       D DIMER    Collection Time: 12/22/22  4:12 PM   Result Value Ref Range    D DIMER 15.61 (H) <0.50 ug/ml(FEU)   COVID-19 WITH INFLUENZA A/B    Collection Time: 12/22/22  8:16 PM   Result Value Ref Range    SARS-CoV-2 by PCR Not Detected Not Detected      Influenza A by PCR Not Detected Not Detected      Influenza B by PCR Not Detected Not Detected     GLUCOSE, POC    Collection Time: 12/22/22  9:19 PM   Result Value Ref Range    Glucose (POC) 49 (LL) 65 - 100 mg/dL    Performed by [employer]    GLUCOSE, POC    Collection Time: 12/22/22  9:21 PM   Result Value Ref Range    Glucose (POC) 64 (L) 65 - 100 mg/dL    Performed by [employer]    RENAL FUNCTION PANEL    Collection Time: 12/22/22  9:29 PM   Result Value Ref Range    Sodium 136 136 - 145 mmol/L    Potassium 5.1 3.5 - 5.1 mmol/L    Chloride 98 97 - 108 mmol/L    CO2 25 21 - 32 mmol/L    Anion gap 13 5 - 15 mmol/L    Glucose 58 (L) 65 - 100 mg/dL    BUN 14 6 - 20 mg/dL    Creatinine 1.75 (H) 0.55 - 1.02 mg/dL    BUN/Creatinine ratio 8 (L) 12 - 20      eGFR 31 (L) >60 ml/min/1.73m2    Calcium 7.4 (L) 8.5 - 10.1 mg/dL    Phosphorus 4.4 2.6 - 4.7 mg/dL    Albumin 2.5 (L) 3.5 - 5.0 g/dL   METABOLIC PANEL, BASIC    Collection Time: 12/22/22  9:29 PM   Result Value Ref Range    Sodium 135 (L) 136 - 145 mmol/L    Potassium 5.0 3.5 - 5.1 mmol/L    Chloride 97 97 - 108 mmol/L    CO2 26 21 - 32 mmol/L    Anion gap 12 5 - 15 mmol/L    Glucose 59 (L) 65 - 100 mg/dL    BUN 14 6 - 20 mg/dL    Creatinine 1.80 (H) 0.55 - 1.02 mg/dL    BUN/Creatinine ratio 8 (L) 12 - 20      eGFR 30 (L) >60 ml/min/1.73m2    Calcium 7.2 (L) 8.5 - 10.1 mg/dL LACTIC ACID    Collection Time: 12/22/22  9:29 PM   Result Value Ref Range    Lactic acid 7.8 (HH) 0.4 - 2.0 mmol/L   BLOOD GAS, ARTERIAL    Collection Time: 12/22/22 10:01 PM   Result Value Ref Range    pH 7.33 (L) 7.35 - 7.45      PCO2 39 35 - 45 mmHg    PO2 82 80 - 100 mmHg    O2 SATURATION 95 95 - 99 %    BICARBONATE 20 (L) 22 - 26 mmol/L    BASE DEFICIT 5.4 mmol/L    O2 METHOD Non Invasive      FIO2 40 %    MODE BiPAP      Tidal volume 450.0      SET RATE 22      IPAP/PIP 6      High PEEP 28      Sample source Arterial      SITE Right Radial      GUMARO'S TEST YES      Carboxy-Hgb 0.3 (L) 1 - 2 %    Methemoglobin 0.6 0 - 1.4 %    Oxyhemoglobin 94.1 (L) 95 - 99 %    Performed by Clarkcindy Mock     TEMPERATURE 99.6     BLOOD GAS, ARTERIAL    Collection Time: 12/22/22 10:01 PM   Result Value Ref Range    pH 7.33 (L) 7.35 - 7.45      PCO2 39 35 - 45 mmHg    PO2 82 80 - 100 mmHg    O2 SATURATION 95 95 - 99 %    BICARBONATE 20 (L) 22 - 26 mmol/L    BASE DEFICIT 5.4 mmol/L    O2 METHOD VENT      FIO2 40 %    MODE ASSIST CONTROL      Tidal volume 450.0      SET RATE 16      PEEP/CPAP 5.0      Sample source Arterial      SITE Right Radial      GUMARO'S TEST YES      Carboxy-Hgb 0.3 (L) 1 - 2 %    Methemoglobin 0.6 0 - 1.4 %    Oxyhemoglobin 94.1 (L) 95 - 99 %    [facility]     TEMPERATURE 99.6     GLUCOSE, POC    Collection Time: 12/22/22 11:25 PM   Result Value Ref Range    Glucose (POC) 155 (H) 65 - 100 mg/dL    Performed by Lev Skelton    LACTIC ACID    Collection Time: 12/23/22  1:09 AM   Result Value Ref Range    Lactic acid 13.8 (HH) 0.4 - 2.0 mmol/L   CBC WITH AUTOMATED DIFF    Collection Time: 12/23/22  1:09 AM   Result Value Ref Range    WBC 10.9 3.6 - 11.0 K/uL    RBC 2.40 (L) 3.80 - 5.20 M/uL    HGB 7.1 (L) 11.5 - 16.0 g/dL    HCT 23.8 (L) 35.0 - 47.0 %    MCV 99.2 (H) 80.0 - 99.0 FL    MCH 29.6 26.0 - 34.0 PG    MCHC 29.8 (L) 30.0 - 36.5 g/dL    RDW 14.8 (H) 11.5 - 14.5 %    PLATELET 621 150 - 400 K/uL    MPV 10.2 8.9 - 12.9 FL    NRBC 15.1 (H) 0.0  WBC    ABSOLUTE NRBC 1.65 (H) 0.00 - 0.01 K/uL    NEUTROPHILS 80 (H) 32 - 75 %    BAND NEUTROPHILS 3 0 - 6 %    LYMPHOCYTES 4 (L) 12 - 49 %    MONOCYTES 12 5 - 13 %    EOSINOPHILS 1 0 - 7 %    BASOPHILS 0 0 - 1 %    IMMATURE GRANULOCYTES 0 %    ABS. NEUTROPHILS 9.1 (H) 1.8 - 8.0 K/UL    ABS. LYMPHOCYTES 0.4 (L) 0.8 - 3.5 K/UL    ABS. MONOCYTES 1.3 (H) 0.0 - 1.0 K/UL    ABS. EOSINOPHILS 0.1 0.0 - 0.4 K/UL    ABS. BASOPHILS 0.0 0.0 - 0.1 K/UL    ABS. IMM. GRANS. 0.0 K/UL    DF Manual      RBC COMMENTS Ovalocytes  3+        RBC COMMENTS Macrocytosis  1+        RBC COMMENTS Polychromasia  1+       METABOLIC PANEL, COMPREHENSIVE    Collection Time: 12/23/22  1:09 AM   Result Value Ref Range    Sodium 134 (L) 136 - 145 mmol/L    Potassium 5.4 (H) 3.5 - 5.1 mmol/L    Chloride 99 97 - 108 mmol/L    CO2 18 (L) 21 - 32 mmol/L    Anion gap 17 (H) 5 - 15 mmol/L    Glucose 112 (H) 65 - 100 mg/dL    BUN 16 6 - 20 mg/dL    Creatinine 2.11 (H) 0.55 - 1.02 mg/dL    BUN/Creatinine ratio 8 (L) 12 - 20      eGFR 24 (L) >60 ml/min/1.73m2    Calcium 6.7 (L) 8.5 - 10.1 mg/dL    Bilirubin, total 1.1 (H) 0.2 - 1.0 mg/dL    AST (SGOT) PENDING U/L    ALT (SGPT) >3,500 (H) 12 - 78 U/L    Alk.  phosphatase 97 45 - 117 U/L    Protein, total 4.4 (L) 6.4 - 8.2 g/dL    Albumin 2.2 (L) 3.5 - 5.0 g/dL    Globulin 2.2 2.0 - 4.0 g/dL    A-G Ratio 1.0 (L) 1.1 - 2.2     MAGNESIUM    Collection Time: 12/23/22  1:09 AM   Result Value Ref Range    Magnesium 1.9 1.6 - 2.4 mg/dL   TYPE & SCREEN    Collection Time: 12/23/22  2:30 AM   Result Value Ref Range    Crossmatch Expiration 12/26/2022,2355     ABO/Rh(D) A Positive     Antibody screen Negative     Unit number [id number]     Blood component type RC LR     Unit division 00     Status of unit [place] to transfuse     Crossmatch result Compatible    LACTIC ACID    Collection Time: 12/23/22  4:39 AM   Result Value Ref Range    Lactic acid 18.9 (HH) 0.4 - 2.0 mmol/L   RENAL FUNCTION PANEL    Collection Time: 12/23/22  4:39 AM   Result Value Ref Range    Sodium 137 136 - 145 mmol/L    Potassium 5.7 (H) 3.5 - 5.1 mmol/L    Chloride 97 97 - 108 mmol/L    CO2 14 (LL) 21 - 32 mmol/L    Anion gap 26 (H) 5 - 15 mmol/L    Glucose 57 (L) 65 - 100 mg/dL    BUN 18 6 - 20 mg/dL    Creatinine 2.30 (H) 0.55 - 1.02 mg/dL    BUN/Creatinine ratio 8 (L) 12 - 20      eGFR 22 (L) >60 ml/min/1.73m2    Calcium 7.5 (L) 8.5 - 10.1 mg/dL    Phosphorus 5.9 (H) 2.6 - 4.7 mg/dL    Albumin 2.5 (L) 3.5 - 5.0 g/dL   CBC WITH AUTOMATED DIFF    Collection Time: 12/23/22  4:39 AM   Result Value Ref Range    WBC 11.1 (H) 3.6 - 11.0 K/uL    RBC 2.42 (L) 3.80 - 5.20 M/uL    HGB 7.0 (L) 11.5 - 16.0 g/dL    HCT 24.2 (L) 35.0 - 47.0 %    .0 (H) 80.0 - 99.0 FL    MCH 28.9 26.0 - 34.0 PG    MCHC 28.9 (L) 30.0 - 36.5 g/dL    RDW 15.0 (H) 11.5 - 14.5 %    PLATELET 042 888 - 708 K/uL    MPV 10.3 8.9 - 12.9 FL    NRBC 22.6 (H) 0.0  WBC    ABSOLUTE NRBC 2.50 (H) 0.00 - 0.01 K/uL    NEUTROPHILS 90 (H) 32 - 75 %    LYMPHOCYTES 3 (L) 12 - 49 %    MONOCYTES 5 5 - 13 %    EOSINOPHILS 0 0 - 7 %    BASOPHILS 0 0 - 1 %    IMMATURE GRANULOCYTES 2 (H) 0 - 0.5 %    ABS. NEUTROPHILS 10.0 (H) 1.8 - 8.0 K/UL    ABS. LYMPHOCYTES 0.4 (L) 0.8 - 3.5 K/UL    ABS. MONOCYTES 0.5 0.0 - 1.0 K/UL    ABS. EOSINOPHILS 0.0 0.0 - 0.4 K/UL    ABS. BASOPHILS 0.0 0.0 - 0.1 K/UL    ABS. IMM.  GRANS. 0.2 (H) 0.00 - 0.04 K/UL    DF AUTOMATED     METABOLIC PANEL, BASIC    Collection Time: 12/23/22  4:39 AM   Result Value Ref Range    Sodium 137 136 - 145 mmol/L    Potassium 5.7 (H) 3.5 - 5.1 mmol/L    Chloride 97 97 - 108 mmol/L    CO2 15 (LL) 21 - 32 mmol/L    Anion gap 25 (H) 5 - 15 mmol/L    Glucose 55 (L) 65 - 100 mg/dL    BUN 17 6 - 20 mg/dL    Creatinine 2.38 (H) 0.55 - 1.02 mg/dL    BUN/Creatinine ratio 7 (L) 12 - 20      eGFR 21 (L) >60 ml/min/1.73m2    Calcium 7.5 (L) 8.5 - 10.1 mg/dL   C REACTIVE PROTEIN, QT    Collection Time: 12/23/22  4:39 AM   Result Value Ref Range    C-Reactive protein 1.20 (H) 0.00 - 0.60 mg/dL   TSH 3RD GENERATION    Collection Time: 12/23/22  4:39 AM   Result Value Ref Range    TSH 2.05 0.36 - 3.74 uIU/mL   MAGNESIUM    Collection Time: 12/23/22  4:39 AM   Result Value Ref Range    Magnesium 2.2 1.6 - 2.4 mg/dL   PHOSPHORUS    Collection Time: 12/23/22  4:39 AM   Result Value Ref Range    Phosphorus 6.0 (H) 2.6 - 4.7 mg/dL   BLOOD GAS, ARTERIAL    Collection Time: 12/23/22  6:14 AM   Result Value Ref Range    pH 7.23 (LL) 7.35 - 7.45      PCO2 33 (L) 35 - 45 mmHg    PO2 107 (H) 80 - 100 mmHg    O2 SATURATION 97 95 - 99 %    BICARBONATE 14 (L) 22 - 26 mmol/L    BASE DEFICIT 13.0 mmol/L    O2 METHOD VENT      FIO2 35 %    MODE ASSIST CONTROL      Tidal volume 450.0      SET RATE 16      PEEP/CPAP 5.0      Sample source Arterial      SITE Right Femoral      GUMARO'S TEST YES      Carboxy-Hgb 0.4 (L) 1 - 2 %    Methemoglobin 0.4 0 - 1.4 %    Oxyhemoglobin 95.7 95 - 99 %    Performed by Quoc Deluca     Critical value read back Called to dr Jonel kohler on 12/23/2022 at 06:19     TEMPERATURE 98.6     GLUCOSE, POC    Collection Time: 12/23/22  8:00 AM   Result Value Ref Range    Glucose (POC) 29 (LL) 65 - 100 mg/dL    Performed by Buzzy Goldberg (Float)    GLUCOSE, POC    Collection Time: 12/23/22  8:03 AM   Result Value Ref Range    Glucose (POC) 37 (LL) 65 - 100 mg/dL    Performed by Buzzy Goldberg (Float)    GLUCOSE, POC    Collection Time: 12/23/22  9:06 AM   Result Value Ref Range    Glucose (POC) 339 (H) 65 - 100 mg/dL    Performed by [address], POC    Collection Time: 12/23/22  9:09 AM   Result Value Ref Range    Glucose (POC) 251 (H) 65 - 100 mg/dL    Performed by Miryam Julian            Assessment and Plan:     # Atrial fibrillation  with RVR  - Rate controlled currently. - Continue to monitor with telemetry.      # Acute respiratory failure with hypoxia  - Intubated overnight  - Pulmonary consulted: recommend to hold steroids.   - ABG worsening today. pH 7.23, pCO2 33, HCO3 14. # Septic shock  - Suspect septic shock secondary to cholecystitis  - Blood culture sent, NGTD  - Consult ID. Dr. aRkesh Cornejo recommends continue empirical IV vancomycin and zosyn for now. # Acute cholecystitis  - IV zosyn   - Surgery consulted. Dr. Mono Newman recommended IR percutaneous cholecystectomy given patient not a candidate of surgical laparoscopic cholecystectomy. However patient also unable to go to IR as well. # DESIRAE  - Worsening, underwent hemodialysis yesterday. CRRT this morning due to low BP   - Consult nephrology: CRRT this am recommended     # Hyperkalemia  - Nephrology consulted  - Continue to have hyperkalemia throughout the day, nephrology recommended hemodialysis      CODE STATUS: DNR     DVT prophylaxis: Compression stockings      Care Plan discussed with: Nurse    I spent 40 minutes critical care time attending to this patient, including direct patient care, coordination of care with nurses, reviewing charts, imaging and results.

## 2022-12-23 NOTE — PROGRESS NOTES
HCO3 14 per ABG as reported by RT at bedside, notified Dr. Stephen Vazquez via [employer]. Awaiting call back for orders. Addendum 12/23/22 8541: Telephone orders with read back received from Dr. Stephen Vazquez for 2 amps of sodium bicarbonate IVP. Addendum 12/23/22 3950: Bladder pressure monitoring setup, bladder pressure with flush measured 16-18. During arterial line placement, Dr. Vladislav Young stated to notify him of UAP pressures greater than 40, which may indicate abdominal compartment syndrome. Addendum 12/23/22 7281: Epi gtt stopped, HR 80, carotid pulse +. Digoxin IV ordered for this am, Afib RVR rate 130's at time of order prior to CRRT start, held due to change in heart rate and decrease with pressors.

## 2022-12-23 NOTE — PROGRESS NOTES
Dr. Ramires Been paged with ABG, bicarb result, glucose, and chemistry pending from lab at this time.

## 2022-12-23 NOTE — PROGRESS NOTES
IR notified about consult and patient condition. Per IR, radiologist will review images and let nurse know if patient is a candidate for abdominal drain.

## 2022-12-23 NOTE — PROGRESS NOTES
Client's heart rate 120-130's currently. Discussed client's status and heart rate with Dr. Rafael Tovar, called via . Telephone orders with read back received for Digoxin 250 mg IV x 1 dose now and hold today's 0900 dose.

## 2022-12-23 NOTE — PROGRESS NOTES
Progress Note      12/23/2022 9:31 AM  NAME: Salima Mae   MRN:  [id number]   Admit Diagnosis: Atrial fibrillation with RVR (HCC) [I48.91]          Assessment/Plan:   A. fib with rapid ventricular response, on as needed digoxin, now rate is well controlled. Respiratory failure, was on BiPAP, now intubated    Sepsis/cholecystitis    Hypotension, on epinephrine, norepinephrine, phenylephrine, vasopressin. Anemia    Hyperkalemia, acute kidney injury         []       High complexity decision making was performed in this patient at high risk for decompensation with multiple organ involvement. Subjective:     Salima Mae intubated  Discussed with RN events overnight. Review of Systems:    Symptom Y/N Comments  Symptom Y/N Comments   Fever/Chills N   Chest Pain N    Poor Appetite N   Edema N    Cough N   Abdominal Pain N    Sputum N   Joint Pain N    SOB/PEDRAZA N   Pruritis/Rash N    Nausea/vomit N   Tolerating PT/OT Y    Diarrhea N   Tolerating Diet Y    Constipation N   Other       Could NOT obtain due to: Intubated     Objective:      Physical Exam:    Last 24hrs VS reviewed since prior progress note. Most recent are:    Visit Vitals  /74   Pulse 70   Temp 98.5 °F (36.9 °C)   Resp 16   Ht 5' 2\" (1.575 m)   Wt 64.5 kg (142 lb 3.2 oz)   SpO2 (!) 83%   BMI 26.01 kg/m²       Intake/Output Summary (Last 24 hours) at 12/23/2022 0931  Last data filed at 12/23/2022 4724  Gross per 24 hour   Intake 6135.89 ml   Output 200 ml   Net 5935.89 ml        General Appearance: Middle-aged female intubated. Ears/Nose/Mouth/Throat: Intubated   neck: Supple. Chest: Lungs clear to auscultation bilaterally. Cardiovascular: Irregular rate and rhythm, S1S2 normal, no murmur. Abdomen: Soft, non-tender, bowel sounds are active. Extremities: No edema bilaterally. Skin: Warm and dry. []         Post-cath site without hematoma, bruit, tenderness, or thrill. Distal pulses intact.     PMH/SH reviewed - no change compared to H&P    Data Review    Telemetry:     EKG:   []  No new EKG for review    Lab Data Personally Reviewed:    Recent Labs     12/23/22 0439 12/23/22 0109   WBC 11.1* 10.9   HGB 7.0* 7.1*   HCT 24.2* 23.8*    286     No results for input(s): INR, PTP, APTT, INREXT in the last 72 hours. Recent Labs     12/23/22 0439 12/23/22 0109 12/22/22 2129 12/22/22  0540 12/21/22  1418     137 134* 135*  136   < > 131*   K 5.7*  5.7* 5.4* 5.0  5.1   < > Hemolysed specimen   CL 97  97 99 97  98   < > 89*   CO2 15*  14* 18* 26  25   < > 37*   BUN 17  18 16 14  14   < > 15   CREA 2.38*  2.30* 2.11* 1.80*  1.75*   < > 1.18*   GLU 55*  57* 112* 59*  58*   < > 213*   CA 7.5*  7.5* 6.7* 7.2*  7.4*   < > 9.5   MG 2.2 1.9  --   --  Hemolysed specimen    < > = values in this interval not displayed. Recent Labs     12/22/22  0540   CPK 88     No results found for: CHOL, CHOLX, CHLST, CHOLV, HDL, HDLP, LDL, LDLC, DLDLP, [place], CHHD, CHHDX    Recent Labs     12/23/22 0439 12/23/22 0109 12/22/22 2129 12/22/22  0540 12/21/22  1418   AP  --  97  --   --  79   TP  --  4.4*  --   --  6.6   ALB 2.5* 2.2* 2.5*   < > 3.5   GLOB  --  2.2  --   --  3.1    < > = values in this interval not displayed.      Recent Labs     12/23/22  0614 12/22/22  2201   PH 7.23* 7.33*  7.33*   PCO2 33* 39  39   PO2 107* 82  82       Medications Personally Reviewed:    Current Facility-Administered Medications   Medication Dose Route Frequency    hydrocortisone Sod Succ (PF) (SOLU-CORTEF) injection 100 mg  100 mg IntraVENous Q6H    0.9% sodium chloride infusion 250 mL  250 mL IntraVENous PRN    bicarbonate dialysis (PRISMASOL) BG K 2/Ca 3.5 5000 ml solution   Extracorporeal DIALYSIS CONTINUOUS    digoxin (LANOXIN) injection 250 mcg  250 mcg IntraVENous NOW    sodium bicarbonate 8.4 % (1 mEq/mL) injection        piperacillin-tazobactam (ZOSYN) 3.375 g in 0.9% sodium chloride (MBP/ADV) 100 mL MBP  3.375 g IntraVENous Q8H    NOREPINephrine (LEVOPHED) 8 mg in 0.9% NS 250ml infusion  0.5-30 mcg/min IntraVENous TITRATE    fentaNYL citrate (PF) injection 25 mcg  25 mcg IntraVENous Q1H PRN    digoxin (LANOXIN) injection 250 mcg  250 mcg IntraVENous DAILY    albuterol-ipratropium (DUO-NEB) 2.5 MG-0.5 MG/3 ML  3 mL Nebulization Q6H PRN    [Held by provider] gabapentin (NEURONTIN) capsule 300 mg  300 mg Oral QHS    dexmedeTOMidine in 0.9 % NaCl (PRECEDEX) 400 mcg/100 mL (4 mcg/mL) infusion soln  0.1-1.5 mcg/kg/hr IntraVENous TITRATE    DULoxetine (CYMBALTA) capsule 20 mg  20 mg Oral DAILY    sodium bicarbonate (8.4%) 150 mEq in dextrose 5% 1,000 mL infusion   IntraVENous CONTINUOUS    albumin human 25% (BUMINATE) solution 25 g  25 g IntraVENous DIALYSIS PRN    vasopressin (VASOSTRICT) 20 Units in 0.9% sodium chloride 100 mL infusion  0.01-0.03 Units/min IntraVENous TITRATE    PHENYLephrine (REG-SYNEPHRINE) 30 mg in 0.9% sodium chloride 250 mL infusion   mcg/min IntraVENous TITRATE    heparin (porcine) 1,000 unit/mL injection 2,500 Units  2,500 Units Hemodialysis DIALYSIS PRN    fentaNYL (PF) 1,500 mcg/30 mL (50 mcg/mL) infusion  0-200 mcg/hr IntraVENous TITRATE    budesonide (PULMICORT) 500 mcg/2 ml nebulizer suspension  500 mcg Nebulization BID RT    EPINEPHrine (ADRENALIN) 5 mg in 0.9% sodium chloride 250 mL infusion  0-10 mcg/min IntraVENous TITRATE    dextrose 10% infusion 0-250 mL  0-250 mL IntraVENous PRN    dilTIAZem (CARDIZEM) 125 mg in 0.9% sodium chloride 125 mL ([id number])  0-15 mg/hr IntraVENous TITRATE    [Held by provider] dilTIAZem ER (CARDIZEM CD) capsule 240 mg  240 mg Oral BID    [Held by provider] budesonide-formoteroL (SYMBICORT) 160-4.5 mcg/actuation HFA inhaler 1 Puff  1 Puff Inhalation BID RT    hydrOXYchloroQUINE (PLAQUENIL) tablet 200 mg  200 mg Oral DAILY WITH BREAKFAST    levothyroxine (SYNTHROID) tablet 75 mcg  75 mcg Oral ACB    [Held by provider] metoprolol tartrate (LOPRESSOR) tablet 50 mg  50 mg Oral BID    [Held by provider] montelukast (SINGULAIR) tablet 10 mg  10 mg Oral DAILY    pantoprazole (PROTONIX) tablet 40 mg  40 mg Oral DAILY    atorvastatin (LIPITOR) tablet 10 mg  10 mg Oral DAILY    tiotropium bromide (SPIRIVA RESPIMAT) 2.5 mcg /actuation  2 Puff Inhalation DAILY    sodium chloride (NS) flush 5-40 mL  5-40 mL IntraVENous Q8H    sodium chloride (NS) flush 5-40 mL  5-40 mL IntraVENous PRN    acetaminophen (TYLENOL) tablet 650 mg  650 mg Oral Q6H PRN    Or    acetaminophen (TYLENOL) suppository 650 mg  650 mg Rectal Q6H PRN    ondansetron (ZOFRAN ODT) tablet 4 mg  4 mg Oral Q8H PRN    Or    ondansetron (ZOFRAN) injection 4 mg  4 mg IntraVENous Q6H PRN    promethazine (PHENERGAN) 12.5 mg in 0.9% sodium chloride 50 mL IVPB  12.5 mg IntraVENous Q4H PRN    [Held by provider] methylPREDNISolone (PF) (SOLU-MEDROL) injection 40 mg  40 mg IntraVENous Q8H    [Held by provider] furosemide (LASIX) injection 40 mg  40 mg IntraVENous DAILY    [Held by provider] verapamiL (CALAN) tablet 80 mg  80 mg Oral TID         Cecilia Mock MD

## 2022-12-23 NOTE — PROGRESS NOTES
Hospitalist Progress Note    Subjective:   Daily Progress Note: 12/23/2022 10:17 AM    Brief HPI/ hospital course:  Ysabel Kong is a 67 y.o. female with PMH of atrial fibrillation, SVT, COPD on home 3L NC and other medical problem as below. She presented to the ED with chief complaint of generalized weakness, shortness of breath, palpitation. She was found to have atrial fibrillation with RVR and started on cardizem ggt. Overnight, she has worsening respiratory failure requiring BiPAP. Also hypotensive requiring pressors. CT abdomen showed possible acute cholecystitis. Also DESIRAE with hyperkalemia. Patient underwent hemodialysis last night. Subsequently requiring higher dose of pressors     Subjective:  Patient intubated overnight. Noticed firm abdomen, however too unstable for laparoscopic surgery or IR percutaneous cholecystectomy. Currently on NE, clint, vaso and epinephrine ggt. Also on bicarb ggt. Talked to daughter, who has been contact with her brother through phone. Discussed poor prognosis and discuss code status. Daughter verbalized wanted her to be DNR.      Current Facility-Administered Medications   Medication Dose Route Frequency    hydrocortisone Sod Succ (PF) (SOLU-CORTEF) injection 100 mg  100 mg IntraVENous Q6H    0.9% sodium chloride infusion 250 mL  250 mL IntraVENous PRN    bicarbonate dialysis (PRISMASOL) BG K 2/Ca 3.5 5000 ml solution   Extracorporeal DIALYSIS CONTINUOUS    digoxin (LANOXIN) injection 250 mcg  250 mcg IntraVENous NOW    sodium bicarbonate 8.4 % (1 mEq/mL) injection        piperacillin-tazobactam (ZOSYN) 3.375 g in 0.9% sodium chloride (MBP/ADV) 100 mL MBP  3.375 g IntraVENous Q8H    NOREPINephrine (LEVOPHED) 8 mg in 0.9% NS 250ml infusion  0.5-30 mcg/min IntraVENous TITRATE    fentaNYL citrate (PF) injection 25 mcg  25 mcg IntraVENous Q1H PRN    digoxin (LANOXIN) injection 250 mcg  250 mcg IntraVENous DAILY    albuterol-ipratropium (DUO-NEB) 2.5 MG-0.5 MG/3 ML  3 mL Nebulization Q6H PRN    [Held by provider] gabapentin (NEURONTIN) capsule 300 mg  300 mg Oral QHS    dexmedeTOMidine in 0.9 % NaCl (PRECEDEX) 400 mcg/100 mL (4 mcg/mL) infusion soln  0.1-1.5 mcg/kg/hr IntraVENous TITRATE    DULoxetine (CYMBALTA) capsule 20 mg  20 mg Oral DAILY    sodium bicarbonate (8.4%) 150 mEq in dextrose 5% 1,000 mL infusion   IntraVENous CONTINUOUS    albumin human 25% (BUMINATE) solution 25 g  25 g IntraVENous DIALYSIS PRN    vasopressin (VASOSTRICT) 20 Units in 0.9% sodium chloride 100 mL infusion  0.01-0.03 Units/min IntraVENous TITRATE    PHENYLephrine (REG-SYNEPHRINE) 30 mg in 0.9% sodium chloride 250 mL infusion   mcg/min IntraVENous TITRATE    heparin (porcine) 1,000 unit/mL injection 2,500 Units  2,500 Units Hemodialysis DIALYSIS PRN    fentaNYL (PF) 1,500 mcg/30 mL (50 mcg/mL) infusion  0-200 mcg/hr IntraVENous TITRATE    budesonide (PULMICORT) 500 mcg/2 ml nebulizer suspension  500 mcg Nebulization BID RT    EPINEPHrine (ADRENALIN) 5 mg in 0.9% sodium chloride 250 mL infusion  0-10 mcg/min IntraVENous TITRATE    dextrose 10% infusion 0-250 mL  0-250 mL IntraVENous PRN    dilTIAZem (CARDIZEM) 125 mg in 0.9% sodium chloride 125 mL ([id number])  0-15 mg/hr IntraVENous TITRATE    [Held by provider] dilTIAZem ER (CARDIZEM CD) capsule 240 mg  240 mg Oral BID    [Held by provider] budesonide-formoteroL (SYMBICORT) 160-4.5 mcg/actuation HFA inhaler 1 Puff  1 Puff Inhalation BID RT    hydrOXYchloroQUINE (PLAQUENIL) tablet 200 mg  200 mg Oral DAILY WITH BREAKFAST    levothyroxine (SYNTHROID) tablet 75 mcg  75 mcg Oral ACB    [Held by provider] metoprolol tartrate (LOPRESSOR) tablet 50 mg  50 mg Oral BID    [Held by provider] montelukast (SINGULAIR) tablet 10 mg  10 mg Oral DAILY    pantoprazole (PROTONIX) tablet 40 mg  40 mg Oral DAILY    atorvastatin (LIPITOR) tablet 10 mg  10 mg Oral DAILY    tiotropium bromide (SPIRIVA RESPIMAT) 2.5 mcg /actuation  2 Puff Inhalation DAILY    sodium chloride (NS) flush 5-40 mL  5-40 mL IntraVENous Q8H    sodium chloride (NS) flush 5-40 mL  5-40 mL IntraVENous PRN    acetaminophen (TYLENOL) tablet 650 mg  650 mg Oral Q6H PRN    Or    acetaminophen (TYLENOL) suppository 650 mg  650 mg Rectal Q6H PRN    ondansetron (ZOFRAN ODT) tablet 4 mg  4 mg Oral Q8H PRN    Or    ondansetron (ZOFRAN) injection 4 mg  4 mg IntraVENous Q6H PRN    promethazine (PHENERGAN) 12.5 mg in 0.9% sodium chloride 50 mL IVPB  12.5 mg IntraVENous Q4H PRN    [Held by provider] methylPREDNISolone (PF) (SOLU-MEDROL) injection 40 mg  40 mg IntraVENous Q8H    [Held by provider] furosemide (LASIX) injection 40 mg  40 mg IntraVENous DAILY    [Held by provider] verapamiL (CALAN) tablet 80 mg  80 mg Oral TID          Objective:     Visit Vitals  /74   Pulse 70   Temp 98.5 °F (36.9 °C)   Resp 16   Ht 5' 2\" (1.575 m)   Wt 64.5 kg (142 lb 3.2 oz)   SpO2 (!) 83%   BMI 26.01 kg/m²    O2 Flow Rate (L/min): 3 l/min O2 Device: Ventilator    Temp (24hrs), Av.3 °F (36.8 °C), Min:94.8 °F (34.9 °C), Max:100 °F (37.8 °C)      No intake/output data recorded.  1901 -  0700  In: 7255.5 [P.O.:400; I.V.:6835.5]  Out: 0 [Urine:30]    PHYSICAL EXAM:   Constitutional: intubated and sedated  Skin: modeling   HEENT: Omitted as patient is unable to cooperate. Cardiovascular: Regular rate and rhythm. Respiratory:  Clear b/l breath sound, no crackles or wheezing. GI: Abdomen distended and tense. Musculoskeletal: Omitted as patient is unable to cooperate. Neurological:  sedated. Omitted as patient is unable to cooperate. Psychiatric: Omitted as patient is unable to cooperate.         Data Review    Recent Results (from the past 24 hour(s))   BLOOD GAS, ARTERIAL    Collection Time: 22 10:10 AM   Result Value Ref Range    pH 7.12 (LL) 7.35 - 7.45      PCO2 67 (H) 35 - 45 mmHg    PO2 67 (L) 80 - 100 mmHg    O2 SATURATION 90 (L) 95 - 99 %    BICARBONATE 22 22 - 26 mmol/L    BASE DEFICIT 8.2 mmol/L    O2 METHOD BiPAP      FIO2 32 %    MODE BiPAP      SET RATE 18      IPAP/PIP 16      PEEP/CPAP 6.0      Sample source Arterial      SITE Right Brachial      GUMARO'S TEST NOT APPLICABLE      Carboxy-Hgb 0.1 (L) 1 - 2 %    Methemoglobin 0.2 0 - 1.4 %    Oxyhemoglobin 89.4 (L) 95 - 99 %    Performed by Radha Rodriguez     Critical value read back       Called to [facility] on 12/22/2022 at 10:13    TEMPERATURE 96.2     EKG, 12 LEAD, INITIAL    Collection Time: 12/22/22 10:38 AM   Result Value Ref Range    Ventricular Rate 99 BPM    Atrial Rate 78 BPM    QRS Duration 98 ms    Q-T Interval 460 ms    QTC Calculation (Bezet) 590 ms    Calculated R Axis 103 degrees    Calculated T Axis -21 degrees    Diagnosis       Atrial fibrillation  Incomplete right bundle branch block  Possible Lateral infarct , age undetermined  ST & T wave abnormality, consider anterior ischemia  Prolonged QT  Abnormal ECG  When compared with ECG of 21-DEC-2022 14:03, (Unconfirmed)  Vent.  rate has decreased BY  53 BPM  Non-specific change in ST segment in Lateral leads  T wave inversion now evident in Inferior leads  T wave inversion now evident in Anterior leads  Confirmed by [facility] MD, Abbie King ([id number]) on 12/22/2022 1:30:59 PM     LACTIC ACID    Collection Time: 12/22/22 11:53 AM   Result Value Ref Range    Lactic acid 10.4 (HH) 0.4 - 2.0 mmol/L   BLOOD GAS, ARTERIAL    Collection Time: 12/22/22  1:04 PM   Result Value Ref Range    pH 7.20 (LL) 7.35 - 7.45      PCO2 53 (H) 35 - 45 mmHg    PO2 74 (L) 80 - 100 mmHg    O2 SATURATION 93 (L) 95 - 99 %    BICARBONATE 20 (L) 22 - 26 mmol/L    BASE DEFICIT 7.8 mmol/L    O2 METHOD BiPAP      FIO2 28 %    Tidal volume 400.0      SET RATE 18      PEEP/CPAP 6.0      Sample source Arterial      SITE Right Radial      GUMARO'S TEST YES      Carboxy-Hgb 0.2 (L) 1 - 2 %    Methemoglobin 0.2 0 - 1.4 %    Oxyhemoglobin 92.3 (L) 95 - 99 %    Performed by Radha Rodriguez     Critical value read back Called to Dr. José Lee  on 12/22/2022 at 13:29     TEMPERATURE 09.5     METABOLIC PANEL, BASIC    Collection Time: 12/22/22  2:56 PM   Result Value Ref Range    Sodium 132 (L) 136 - 145 mmol/L    Potassium 7.0 (HH) 3.5 - 5.1 mmol/L    Chloride 94 (L) 97 - 108 mmol/L    CO2 22 21 - 32 mmol/L    Anion gap 16 (H) 5 - 15 mmol/L    Glucose 71 65 - 100 mg/dL    BUN 26 (H) 6 - 20 mg/dL    Creatinine 2.48 (H) 0.55 - 1.02 mg/dL    BUN/Creatinine ratio 10 (L) 12 - 20      eGFR 20 (L) >60 ml/min/1.73m2    Calcium 7.9 (L) 8.5 - 10.1 mg/dL   RENAL FUNCTION PANEL    Collection Time: 12/22/22  2:56 PM   Result Value Ref Range    Sodium 131 (L) 136 - 145 mmol/L    Potassium 7.0 (HH) 3.5 - 5.1 mmol/L    Chloride 94 (L) 97 - 108 mmol/L    CO2 21 21 - 32 mmol/L    Anion gap 16 (H) 5 - 15 mmol/L    Glucose 71 65 - 100 mg/dL    BUN 25 (H) 6 - 20 mg/dL    Creatinine 2.55 (H) 0.55 - 1.02 mg/dL    BUN/Creatinine ratio 10 (L) 12 - 20      eGFR 19 (L) >60 ml/min/1.73m2    Calcium 7.8 (L) 8.5 - 10.1 mg/dL    Phosphorus 8.6 (H) 2.6 - 4.7 mg/dL    Albumin 2.7 (L) 3.5 - 5.0 g/dL   LACTIC ACID    Collection Time: 12/22/22  2:56 PM   Result Value Ref Range    Lactic acid 11.6 (HH) 0.4 - 2.0 mmol/L   PROCALCITONIN    Collection Time: 12/22/22  2:56 PM   Result Value Ref Range    Procalcitonin 0.62 (H) 0 ng/mL   CBC WITH AUTOMATED DIFF    Collection Time: 12/22/22  4:12 PM   Result Value Ref Range    WBC 6.4 3.6 - 11.0 K/uL    RBC 2.77 (L) 3.80 - 5.20 M/uL    HGB 8.0 (L) 11.5 - 16.0 g/dL    HCT 28.1 (L) 35.0 - 47.0 %    .4 (H) 80.0 - 99.0 FL    MCH 28.9 26.0 - 34.0 PG    MCHC 28.5 (L) 30.0 - 36.5 g/dL    RDW 14.9 (H) 11.5 - 14.5 %    PLATELET 727 041 - 600 K/uL    MPV 9.4 8.9 - 12.9 FL    NRBC 6.4 (H) 0.0  WBC    ABSOLUTE NRBC 0.41 (H) 0.00 - 0.01 K/uL    NEUTROPHILS 84 (H) 32 - 75 %    BAND NEUTROPHILS 2 0 - 6 %    LYMPHOCYTES 7 (L) 12 - 49 %    MONOCYTES 7 5 - 13 %    EOSINOPHILS 0 0 - 7 %    BASOPHILS 0 0 - 1 %    IMMATURE GRANULOCYTES 0 %    ABS. NEUTROPHILS 5.6 1.8 - 8.0 K/UL    ABS. LYMPHOCYTES 0.4 (L) 0.8 - 3.5 K/UL    ABS. MONOCYTES 0.4 0.0 - 1.0 K/UL    ABS. EOSINOPHILS 0.0 0.0 - 0.4 K/UL    ABS. BASOPHILS 0.0 0.0 - 0.1 K/UL    ABS. IMM.  GRANS. 0.0 K/UL    DF Manual      RBC COMMENTS Anisocytosis  1+        RBC COMMENTS Macrocytosis  1+       D DIMER    Collection Time: 12/22/22  4:12 PM   Result Value Ref Range    D DIMER 15.61 (H) <0.50 ug/ml(FEU)   COVID-19 WITH INFLUENZA A/B    Collection Time: 12/22/22  8:16 PM   Result Value Ref Range    SARS-CoV-2 by PCR Not Detected Not Detected      Influenza A by PCR Not Detected Not Detected      Influenza B by PCR Not Detected Not Detected     GLUCOSE, POC    Collection Time: 12/22/22  9:19 PM   Result Value Ref Range    Glucose (POC) 49 (LL) 65 - 100 mg/dL    Performed by Wood Lopes    GLUCOSE, POC    Collection Time: 12/22/22  9:21 PM   Result Value Ref Range    Glucose (POC) 64 (L) 65 - 100 mg/dL    Performed by Wood Lopes    RENAL FUNCTION PANEL    Collection Time: 12/22/22  9:29 PM   Result Value Ref Range    Sodium 136 136 - 145 mmol/L    Potassium 5.1 3.5 - 5.1 mmol/L    Chloride 98 97 - 108 mmol/L    CO2 25 21 - 32 mmol/L    Anion gap 13 5 - 15 mmol/L    Glucose 58 (L) 65 - 100 mg/dL    BUN 14 6 - 20 mg/dL    Creatinine 1.75 (H) 0.55 - 1.02 mg/dL    BUN/Creatinine ratio 8 (L) 12 - 20      eGFR 31 (L) >60 ml/min/1.73m2    Calcium 7.4 (L) 8.5 - 10.1 mg/dL    Phosphorus 4.4 2.6 - 4.7 mg/dL    Albumin 2.5 (L) 3.5 - 5.0 g/dL   METABOLIC PANEL, BASIC    Collection Time: 12/22/22  9:29 PM   Result Value Ref Range    Sodium 135 (L) 136 - 145 mmol/L    Potassium 5.0 3.5 - 5.1 mmol/L    Chloride 97 97 - 108 mmol/L    CO2 26 21 - 32 mmol/L    Anion gap 12 5 - 15 mmol/L    Glucose 59 (L) 65 - 100 mg/dL    BUN 14 6 - 20 mg/dL    Creatinine 1.80 (H) 0.55 - 1.02 mg/dL    BUN/Creatinine ratio 8 (L) 12 - 20      eGFR 30 (L) >60 ml/min/1.73m2    Calcium 7.2 (L) 8.5 - 10.1 mg/dL LACTIC ACID    Collection Time: 12/22/22  9:29 PM   Result Value Ref Range    Lactic acid 7.8 (HH) 0.4 - 2.0 mmol/L   BLOOD GAS, ARTERIAL    Collection Time: 12/22/22 10:01 PM   Result Value Ref Range    pH 7.33 (L) 7.35 - 7.45      PCO2 39 35 - 45 mmHg    PO2 82 80 - 100 mmHg    O2 SATURATION 95 95 - 99 %    BICARBONATE 20 (L) 22 - 26 mmol/L    BASE DEFICIT 5.4 mmol/L    O2 METHOD Non Invasive      FIO2 40 %    MODE BiPAP      Tidal volume 450.0      SET RATE 22      IPAP/PIP 6      High PEEP 28      Sample source Arterial      SITE Right Radial      GUMARO'S TEST YES      Carboxy-Hgb 0.3 (L) 1 - 2 %    Methemoglobin 0.6 0 - 1.4 %    Oxyhemoglobin 94.1 (L) 95 - 99 %    Performed by [facility]     TEMPERATURE 99.6     BLOOD GAS, ARTERIAL    Collection Time: 12/22/22 10:01 PM   Result Value Ref Range    pH 7.33 (L) 7.35 - 7.45      PCO2 39 35 - 45 mmHg    PO2 82 80 - 100 mmHg    O2 SATURATION 95 95 - 99 %    BICARBONATE 20 (L) 22 - 26 mmol/L    BASE DEFICIT 5.4 mmol/L    O2 METHOD VENT      FIO2 40 %    MODE ASSIST CONTROL      Tidal volume 450.0      SET RATE 16      PEEP/CPAP 5.0      Sample source Arterial      SITE Right Radial      GUMARO'S TEST YES      Carboxy-Hgb 0.3 (L) 1 - 2 %    Methemoglobin 0.6 0 - 1.4 %    Oxyhemoglobin 94.1 (L) 95 - 99 %    Performed by [facility]     TEMPERATURE 99.6     GLUCOSE, POC    Collection Time: 12/22/22 11:25 PM   Result Value Ref Range    Glucose (POC) 155 (H) 65 - 100 mg/dL    Performed by [facility]    LACTIC ACID    Collection Time: 12/23/22  1:09 AM   Result Value Ref Range    Lactic acid 13.8 (HH) 0.4 - 2.0 mmol/L   CBC WITH AUTOMATED DIFF    Collection Time: 12/23/22  1:09 AM   Result Value Ref Range    WBC 10.9 3.6 - 11.0 K/uL    RBC 2.40 (L) 3.80 - 5.20 M/uL    HGB 7.1 (L) 11.5 - 16.0 g/dL    HCT 23.8 (L) 35.0 - 47.0 %    MCV 99.2 (H) 80.0 - 99.0 FL    MCH 29.6 26.0 - 34.0 PG    MCHC 29.8 (L) 30.0 - 36.5 g/dL    RDW 14.8 (H) 11.5 - 14.5 %    PLATELET 848 150 - 400 K/uL    MPV 10.2 8.9 - 12.9 FL    NRBC 15.1 (H) 0.0  WBC    ABSOLUTE NRBC 1.65 (H) 0.00 - 0.01 K/uL    NEUTROPHILS 80 (H) 32 - 75 %    BAND NEUTROPHILS 3 0 - 6 %    LYMPHOCYTES 4 (L) 12 - 49 %    MONOCYTES 12 5 - 13 %    EOSINOPHILS 1 0 - 7 %    BASOPHILS 0 0 - 1 %    IMMATURE GRANULOCYTES 0 %    ABS. NEUTROPHILS 9.1 (H) 1.8 - 8.0 K/UL    ABS. LYMPHOCYTES 0.4 (L) 0.8 - 3.5 K/UL    ABS. MONOCYTES 1.3 (H) 0.0 - 1.0 K/UL    ABS. EOSINOPHILS 0.1 0.0 - 0.4 K/UL    ABS. BASOPHILS 0.0 0.0 - 0.1 K/UL    ABS. IMM. GRANS. 0.0 K/UL    DF Manual      RBC COMMENTS Ovalocytes  3+        RBC COMMENTS Macrocytosis  1+        RBC COMMENTS Polychromasia  1+       METABOLIC PANEL, COMPREHENSIVE    Collection Time: 12/23/22  1:09 AM   Result Value Ref Range    Sodium 134 (L) 136 - 145 mmol/L    Potassium 5.4 (H) 3.5 - 5.1 mmol/L    Chloride 99 97 - 108 mmol/L    CO2 18 (L) 21 - 32 mmol/L    Anion gap 17 (H) 5 - 15 mmol/L    Glucose 112 (H) 65 - 100 mg/dL    BUN 16 6 - 20 mg/dL    Creatinine 2.11 (H) 0.55 - 1.02 mg/dL    BUN/Creatinine ratio 8 (L) 12 - 20      eGFR 24 (L) >60 ml/min/1.73m2    Calcium 6.7 (L) 8.5 - 10.1 mg/dL    Bilirubin, total 1.1 (H) 0.2 - 1.0 mg/dL    AST (SGOT) PENDING U/L    ALT (SGPT) >3,500 (H) 12 - 78 U/L    Alk.  phosphatase 97 45 - 117 U/L    Protein, total 4.4 (L) 6.4 - 8.2 g/dL    Albumin 2.2 (L) 3.5 - 5.0 g/dL    Globulin 2.2 2.0 - 4.0 g/dL    A-G Ratio 1.0 (L) 1.1 - 2.2     MAGNESIUM    Collection Time: 12/23/22  1:09 AM   Result Value Ref Range    Magnesium 1.9 1.6 - 2.4 mg/dL   TYPE & SCREEN    Collection Time: 12/23/22  2:30 AM   Result Value Ref Range    Crossmatch Expiration 12/26/2022,2359     ABO/Rh(D) A Positive     Antibody screen Negative     Unit number [id number]     Blood component type RC LR     Unit division 00     Status of unit [place] to transfuse     Crossmatch result Compatible    LACTIC ACID    Collection Time: 12/23/22  4:39 AM   Result Value Ref Range    Lactic acid 18.9 (HH) 0.4 - 2.0 mmol/L   RENAL FUNCTION PANEL    Collection Time: 12/23/22  4:39 AM   Result Value Ref Range    Sodium 137 136 - 145 mmol/L    Potassium 5.7 (H) 3.5 - 5.1 mmol/L    Chloride 97 97 - 108 mmol/L    CO2 14 (LL) 21 - 32 mmol/L    Anion gap 26 (H) 5 - 15 mmol/L    Glucose 57 (L) 65 - 100 mg/dL    BUN 18 6 - 20 mg/dL    Creatinine 2.30 (H) 0.55 - 1.02 mg/dL    BUN/Creatinine ratio 8 (L) 12 - 20      eGFR 22 (L) >60 ml/min/1.73m2    Calcium 7.5 (L) 8.5 - 10.1 mg/dL    Phosphorus 5.9 (H) 2.6 - 4.7 mg/dL    Albumin 2.5 (L) 3.5 - 5.0 g/dL   CBC WITH AUTOMATED DIFF    Collection Time: 12/23/22  4:39 AM   Result Value Ref Range    WBC 11.1 (H) 3.6 - 11.0 K/uL    RBC 2.42 (L) 3.80 - 5.20 M/uL    HGB 7.0 (L) 11.5 - 16.0 g/dL    HCT 24.2 (L) 35.0 - 47.0 %    .0 (H) 80.0 - 99.0 FL    MCH 28.9 26.0 - 34.0 PG    MCHC 28.9 (L) 30.0 - 36.5 g/dL    RDW 15.0 (H) 11.5 - 14.5 %    PLATELET 483 849 - 266 K/uL    MPV 10.3 8.9 - 12.9 FL    NRBC 22.6 (H) 0.0  WBC    ABSOLUTE NRBC 2.50 (H) 0.00 - 0.01 K/uL    NEUTROPHILS 90 (H) 32 - 75 %    LYMPHOCYTES 3 (L) 12 - 49 %    MONOCYTES 5 5 - 13 %    EOSINOPHILS 0 0 - 7 %    BASOPHILS 0 0 - 1 %    IMMATURE GRANULOCYTES 2 (H) 0 - 0.5 %    ABS. NEUTROPHILS 10.0 (H) 1.8 - 8.0 K/UL    ABS. LYMPHOCYTES 0.4 (L) 0.8 - 3.5 K/UL    ABS. MONOCYTES 0.5 0.0 - 1.0 K/UL    ABS. EOSINOPHILS 0.0 0.0 - 0.4 K/UL    ABS. BASOPHILS 0.0 0.0 - 0.1 K/UL    ABS. IMM.  GRANS. 0.2 (H) 0.00 - 0.04 K/UL    DF AUTOMATED     METABOLIC PANEL, BASIC    Collection Time: 12/23/22  4:39 AM   Result Value Ref Range    Sodium 137 136 - 145 mmol/L    Potassium 5.7 (H) 3.5 - 5.1 mmol/L    Chloride 97 97 - 108 mmol/L    CO2 15 (LL) 21 - 32 mmol/L    Anion gap 25 (H) 5 - 15 mmol/L    Glucose 55 (L) 65 - 100 mg/dL    BUN 17 6 - 20 mg/dL    Creatinine 2.38 (H) 0.55 - 1.02 mg/dL    BUN/Creatinine ratio 7 (L) 12 - 20      eGFR 21 (L) >60 ml/min/1.73m2    Calcium 7.5 (L) 8.5 - 10.1 mg/dL   C REACTIVE PROTEIN, QT    Collection Time: 12/23/22  4:39 AM   Result Value Ref Range    C-Reactive protein 1.20 (H) 0.00 - 0.60 mg/dL   TSH 3RD GENERATION    Collection Time: 12/23/22  4:39 AM   Result Value Ref Range    TSH 2.05 0.36 - 3.74 uIU/mL   MAGNESIUM    Collection Time: 12/23/22  4:39 AM   Result Value Ref Range    Magnesium 2.2 1.6 - 2.4 mg/dL   PHOSPHORUS    Collection Time: 12/23/22  4:39 AM   Result Value Ref Range    Phosphorus 6.0 (H) 2.6 - 4.7 mg/dL   BLOOD GAS, ARTERIAL    Collection Time: 12/23/22  6:14 AM   Result Value Ref Range    pH 7.23 (LL) 7.35 - 7.45      PCO2 33 (L) 35 - 45 mmHg    PO2 107 (H) 80 - 100 mmHg    O2 SATURATION 97 95 - 99 %    BICARBONATE 14 (L) 22 - 26 mmol/L    BASE DEFICIT 13.0 mmol/L    O2 METHOD VENT      FIO2 35 %    MODE ASSIST CONTROL      Tidal volume 450.0      SET RATE 16      PEEP/CPAP 5.0      Sample source Arterial      SITE Right Femoral      GUMARO'S TEST YES      Carboxy-Hgb 0.4 (L) 1 - 2 %    Methemoglobin 0.4 0 - 1.4 %    Oxyhemoglobin 95.7 95 - 99 %    Performed by Tamara Onofre     Critical value read back Called to dr Gunner kohler on 12/23/2022 at 06:19     TEMPERATURE 98.6     GLUCOSE, POC    Collection Time: 12/23/22  8:00 AM   Result Value Ref Range    Glucose (POC) 29 (LL) 65 - 100 mg/dL    Performed by Sebastian Duval (Float)    GLUCOSE, POC    Collection Time: 12/23/22  8:03 AM   Result Value Ref Range    Glucose (POC) 37 (LL) 65 - 100 mg/dL    Performed by Sebastian Duval (Float)    GLUCOSE, POC    Collection Time: 12/23/22  9:06 AM   Result Value Ref Range    Glucose (POC) 339 (H) 65 - 100 mg/dL    Performed by [facility] [address] [facility], POC    Collection Time: 12/23/22  9:09 AM   Result Value Ref Range    Glucose (POC) 251 (H) 65 - 100 mg/dL    Performed by Jose Luis Majano            Assessment and Plan:     # Atrial fibrillation  with RVR  - Rate controlled currently. - Continue to monitor with telemetry.      # Acute respiratory failure with hypoxia  - Intubated overnight  - Pulmonary consulted: recommend to hold steroids.   - ABG worsening today. pH 7.23, pCO2 33, HCO3 14. # Septic shock  - Suspect septic shock secondary to cholecystitis  - Blood culture sent, NGTD  - Consult ID. Dr. Nolvia Gonzalez recommends continue empirical IV vancomycin and zosyn for now. # Acute cholecystitis  - IV zosyn   - Surgery consulted. Dr. Hammad Dhillon recommended IR percutaneous cholecystectomy given patient not a candidate of surgical laparoscopic cholecystectomy. However patient also unable to go to IR as well. # DESIRAE  - Worsening, underwent hemodialysis yesterday. CRRT this morning due to low BP   - Consult nephrology: CRRT this am recommended     # Hyperkalemia  - Nephrology consulted  - Continue to have hyperkalemia throughout the day, nephrology recommended hemodialysis      CODE STATUS: DNR     DVT prophylaxis: Compression stockings      Care Plan discussed with: Nurse    I spent 40 minutes critical care time attending to this patient, including direct patient care, coordination of care with nurses, reviewing charts, imaging and results.

## 2022-12-23 NOTE — PROGRESS NOTES
Spiritual Care Assessment/Progress Note  [facility]      NAME: Roopa Vieyra      MRN: [id number]  AGE: 67 y.o. SEX: female  Christianity Affiliation: Yarsanism   Language: English     12/23/2022     Total Time (in minutes): 50     Spiritual Assessment begun in [address] ICU through conversation with:         [x]Patient        [x] Family    [] Friend(s)        Reason for Consult: Death, Inpatient, Family care     Spiritual beliefs: (Please include comment if needed)     [x] Identifies with a tiburcio tradition:         [] Supported by a tiburcio community:            [] Claims no spiritual orientation:           [] Seeking spiritual identity:                [] Adheres to an individual form of spirituality:           [] Not able to assess:                           Identified resources for coping:      [x] Prayer                               [] Music                  [] Guided Imagery     [x] Family/friends                 [] Pet visits     [] Devotional reading                         [] Unknown     [] Other:                                               Interventions offered during this visit: (See comments for more details)          Family/Friend(s):  Affirmation of emotions/emotional suffering, Affirmation of tiburcio, Catharsis/review of pertinent events in supportive environment, Coping skills reviewed/reinforced, Guidance concerning next steps/process to be expected, End of life issues discussed, Iconic (affirming the presence of God/Higher Power), Initial Assessment, Prayer (actual), Christianity beliefs/image of God discussed     Plan of Care:     [] Support spiritual and/or cultural needs    [] Support AMD and/or advance care planning process      [] Support grieving process   [] Coordinate Rites and/or Rituals    [] Coordination with community clergy   [] No spiritual needs identified at this time   [] Detailed Plan of Care below (See Comments)  [] Make referral to Music Therapy  [] Make referral to Pet Therapy     [] Make referral to Addiction services  [] Make referral to [facility]  [] Make referral to Spiritual Care Partner  [] No future visits requested        [x] Contact Spiritual Care for further referrals     Comments: The purpose of the visit was in response to death of the patient and to offer family care. The patient's daughter was at the bedside at the time of the death, and she welcomed the visit. As the family continued to join, they each grieved tearfully and somberly. They consoled one another at the time, as they wept sorely. They each expressed their love for the patient how she would be missed. Her daughter mentioned that she was at peace knowing that her mother was not at rest. She mentioned that her mother always valued prayer, and that she was a woman of tiburcio. The  provided the ministry of presence, explored painful feelings, affirmed loving and supportive presence, encouraged ongoing loving presence, extended the prayer of comfort, and provided the ministry of presence and the comfort of spiritual care. [facility] D.Min, M.Div.  [address] can be reached by calling the  at [facility]  [phone]

## 2022-12-23 NOTE — DISCHARGE SUMMARY
Hospitalist Discharge Summary     Patient ID:    Tiffanie Landa  [id number]  42 y.o.  1950    Admit date: 2022    Discharge date : 2022      Final Diagnoses: Active Problems:    Atrial fibrillation with RVR ([address] (2022)    Septic shock  Acute respiratory failure with hypoxia  Acute cholecystitis  DESIRAE  Hyperkalemia    Reason for Hospitalization/Hospital Course:   Atrial fibrillation with RVR    Discharge Medications:   Discharge Medication List as of 2022 12:13 PM            Follow up Care:    Anne Wheeler DO in 1-2 weeks. Follow-up Information    None           Patient Follow Up Instructions: Activity: NA  Diet:  NA  Wound Care: None needed     Condition at Discharge:  Stable  __________________________________________________________________    Disposition   in [address]  ____________________________________________________________________    Code Status:  Prior  ___________________________________________________________________    Discharge Exam:  Patient seen and examined by me on discharge day. Pertinent Findings:  Gen:    Not in distress  Chest: Clear lungs  CVS:   Regular rhythm.   No edema  Abd:  Soft, not distended, not tender  Neuro:  Alert        CONSULTATIONS: Cardiology, Pulmonary/Intensive care, ID, Nephrology, and General Surgery    Significant Diagnostic Studies:   Recent Results (from the past 24 hour(s))   COVID-19 WITH INFLUENZA A/B    Collection Time: 22  8:16 PM   Result Value Ref Range    SARS-CoV-2 by PCR Not Detected Not Detected      Influenza A by PCR Not Detected Not Detected      Influenza B by PCR Not Detected Not Detected     GLUCOSE, POC    Collection Time: 22  9:19 PM   Result Value Ref Range    Glucose (POC) 49 (LL) 65 - 100 mg/dL    Performed by Padmini Collins, POC    Collection Time: 22  9:21 PM   Result Value Ref Range    Glucose (POC) 64 (L) 65 - 100 mg/dL Performed by Kaley Ardon    RENAL FUNCTION PANEL    Collection Time: 12/22/22  9:29 PM   Result Value Ref Range    Sodium 136 136 - 145 mmol/L    Potassium 5.1 3.5 - 5.1 mmol/L    Chloride 98 97 - 108 mmol/L    CO2 25 21 - 32 mmol/L    Anion gap 13 5 - 15 mmol/L    Glucose 58 (L) 65 - 100 mg/dL    BUN 14 6 - 20 mg/dL    Creatinine 1.75 (H) 0.55 - 1.02 mg/dL    BUN/Creatinine ratio 8 (L) 12 - 20      eGFR 31 (L) >60 ml/min/1.73m2    Calcium 7.4 (L) 8.5 - 10.1 mg/dL    Phosphorus 4.4 2.6 - 4.7 mg/dL    Albumin 2.5 (L) 3.5 - 5.0 g/dL   METABOLIC PANEL, BASIC    Collection Time: 12/22/22  9:29 PM   Result Value Ref Range    Sodium 135 (L) 136 - 145 mmol/L    Potassium 5.0 3.5 - 5.1 mmol/L    Chloride 97 97 - 108 mmol/L    CO2 26 21 - 32 mmol/L    Anion gap 12 5 - 15 mmol/L    Glucose 59 (L) 65 - 100 mg/dL    BUN 14 6 - 20 mg/dL    Creatinine 1.80 (H) 0.55 - 1.02 mg/dL    BUN/Creatinine ratio 8 (L) 12 - 20      eGFR 30 (L) >60 ml/min/1.73m2    Calcium 7.2 (L) 8.5 - 10.1 mg/dL   LACTIC ACID    Collection Time: 12/22/22  9:29 PM   Result Value Ref Range    Lactic acid 7.8 (HH) 0.4 - 2.0 mmol/L   BLOOD GAS, ARTERIAL    Collection Time: 12/22/22 10:01 PM   Result Value Ref Range    pH 7.33 (L) 7.35 - 7.45      PCO2 39 35 - 45 mmHg    PO2 82 80 - 100 mmHg    O2 SATURATION 95 95 - 99 %    BICARBONATE 20 (L) 22 - 26 mmol/L    BASE DEFICIT 5.4 mmol/L    O2 METHOD Non Invasive      FIO2 40 %    MODE BiPAP      Tidal volume 450.0      SET RATE 22      IPAP/PIP 6      High PEEP 28      Sample source Arterial      SITE Right Radial      GUMARO'S TEST YES      Carboxy-Hgb 0.3 (L) 1 - 2 %    Methemoglobin 0.6 0 - 1.4 %    Oxyhemoglobin 94.1 (L) 95 - 99 %    Performed by Jessi Drummond     TEMPERATURE 99.6     BLOOD GAS, ARTERIAL    Collection Time: 12/22/22 10:01 PM   Result Value Ref Range    pH 7.33 (L) 7.35 - 7.45      PCO2 39 35 - 45 mmHg    PO2 82 80 - 100 mmHg    O2 SATURATION 95 95 - 99 %    BICARBONATE 20 (L) 22 - 26 mmol/L BASE DEFICIT 5.4 mmol/L    O2 METHOD VENT      FIO2 40 %    MODE ASSIST CONTROL      Tidal volume 450.0      SET RATE 16      PEEP/CPAP 5.0      Sample source Arterial      SITE Right Radial      GUMARO'S TEST YES      Carboxy-Hgb 0.3 (L) 1 - 2 %    Methemoglobin 0.6 0 - 1.4 %    Oxyhemoglobin 94.1 (L) 95 - 99 %    Performed by Clarence David     TEMPERATURE 99.6     GLUCOSE, POC    Collection Time: 12/22/22 11:25 PM   Result Value Ref Range    Glucose (POC) 155 (H) 65 - 100 mg/dL    Performed by Maciej Rogers    LACTIC ACID    Collection Time: 12/23/22  1:09 AM   Result Value Ref Range    Lactic acid 13.8 (HH) 0.4 - 2.0 mmol/L   CBC WITH AUTOMATED DIFF    Collection Time: 12/23/22  1:09 AM   Result Value Ref Range    WBC 10.9 3.6 - 11.0 K/uL    RBC 2.40 (L) 3.80 - 5.20 M/uL    HGB 7.1 (L) 11.5 - 16.0 g/dL    HCT 23.8 (L) 35.0 - 47.0 %    MCV 99.2 (H) 80.0 - 99.0 FL    MCH 29.6 26.0 - 34.0 PG    MCHC 29.8 (L) 30.0 - 36.5 g/dL    RDW 14.8 (H) 11.5 - 14.5 %    PLATELET 857 985 - 066 K/uL    MPV 10.2 8.9 - 12.9 FL    NRBC 15.1 (H) 0.0  WBC    ABSOLUTE NRBC 1.65 (H) 0.00 - 0.01 K/uL    NEUTROPHILS 80 (H) 32 - 75 %    BAND NEUTROPHILS 3 0 - 6 %    LYMPHOCYTES 4 (L) 12 - 49 %    MONOCYTES 12 5 - 13 %    EOSINOPHILS 1 0 - 7 %    BASOPHILS 0 0 - 1 %    IMMATURE GRANULOCYTES 0 %    ABS. NEUTROPHILS 9.1 (H) 1.8 - 8.0 K/UL    ABS. LYMPHOCYTES 0.4 (L) 0.8 - 3.5 K/UL    ABS. MONOCYTES 1.3 (H) 0.0 - 1.0 K/UL    ABS. EOSINOPHILS 0.1 0.0 - 0.4 K/UL    ABS. BASOPHILS 0.0 0.0 - 0.1 K/UL    ABS. IMM.  GRANS. 0.0 K/UL    DF Manual      RBC COMMENTS Ovalocytes  3+        RBC COMMENTS Macrocytosis  1+        RBC COMMENTS Polychromasia  1+       METABOLIC PANEL, COMPREHENSIVE    Collection Time: 12/23/22  1:09 AM   Result Value Ref Range    Sodium 134 (L) 136 - 145 mmol/L    Potassium 5.4 (H) 3.5 - 5.1 mmol/L    Chloride 99 97 - 108 mmol/L    CO2 18 (L) 21 - 32 mmol/L    Anion gap 17 (H) 5 - 15 mmol/L    Glucose 112 (H) 65 - 100 mg/dL    BUN 16 6 - 20 mg/dL    Creatinine 2.11 (H) 0.55 - 1.02 mg/dL    BUN/Creatinine ratio 8 (L) 12 - 20      eGFR 24 (L) >60 ml/min/1.73m2    Calcium 6.7 (L) 8.5 - 10.1 mg/dL    Bilirubin, total 1.1 (H) 0.2 - 1.0 mg/dL    AST (SGOT) PENDING U/L    ALT (SGPT) >3,500 (H) 12 - 78 U/L    Alk.  phosphatase 97 45 - 117 U/L    Protein, total 4.4 (L) 6.4 - 8.2 g/dL    Albumin 2.2 (L) 3.5 - 5.0 g/dL    Globulin 2.2 2.0 - 4.0 g/dL    A-G Ratio 1.0 (L) 1.1 - 2.2     MAGNESIUM    Collection Time: 12/23/22  1:09 AM   Result Value Ref Range    Magnesium 1.9 1.6 - 2.4 mg/dL   TYPE & SCREEN    Collection Time: 12/23/22  2:30 AM   Result Value Ref Range    Crossmatch Expiration 12/26/2022,2359     ABO/Rh(D) A Positive     Antibody screen Negative     Unit number [id number]     Blood component type RC LR     Unit division 00     Status of unit Allocated     TRANSFUSION STATUS Ok to transfuse     Crossmatch result Compatible    LACTIC ACID    Collection Time: 12/23/22  4:39 AM   Result Value Ref Range    Lactic acid 18.9 (HH) 0.4 - 2.0 mmol/L   RENAL FUNCTION PANEL    Collection Time: 12/23/22  4:39 AM   Result Value Ref Range    Sodium 137 136 - 145 mmol/L    Potassium 5.7 (H) 3.5 - 5.1 mmol/L    Chloride 97 97 - 108 mmol/L    CO2 14 (LL) 21 - 32 mmol/L    Anion gap 26 (H) 5 - 15 mmol/L    Glucose 57 (L) 65 - 100 mg/dL    BUN 18 6 - 20 mg/dL    Creatinine 2.30 (H) 0.55 - 1.02 mg/dL    BUN/Creatinine ratio 8 (L) 12 - 20      eGFR 22 (L) >60 ml/min/1.73m2    Calcium 7.5 (L) 8.5 - 10.1 mg/dL    Phosphorus 5.9 (H) 2.6 - 4.7 mg/dL    Albumin 2.5 (L) 3.5 - 5.0 g/dL   CBC WITH AUTOMATED DIFF    Collection Time: 12/23/22  4:39 AM   Result Value Ref Range    WBC 11.1 (H) 3.6 - 11.0 K/uL    RBC 2.42 (L) 3.80 - 5.20 M/uL    HGB 7.0 (L) 11.5 - 16.0 g/dL    HCT 24.2 (L) 35.0 - 47.0 %    .0 (H) 80.0 - 99.0 FL    MCH 28.9 26.0 - 34.0 PG    MCHC 28.9 (L) 30.0 - 36.5 g/dL    RDW 15.0 (H) 11.5 - 14.5 %    PLATELET 462 260 - 181 K/uL MPV 10.3 8.9 - 12.9 FL    NRBC 22.6 (H) 0.0  WBC    ABSOLUTE NRBC 2.50 (H) 0.00 - 0.01 K/uL    NEUTROPHILS 90 (H) 32 - 75 %    LYMPHOCYTES 3 (L) 12 - 49 %    MONOCYTES 5 5 - 13 %    EOSINOPHILS 0 0 - 7 %    BASOPHILS 0 0 - 1 %    IMMATURE GRANULOCYTES 2 (H) 0 - 0.5 %    ABS. NEUTROPHILS 10.0 (H) 1.8 - 8.0 K/UL    ABS. LYMPHOCYTES 0.4 (L) 0.8 - 3.5 K/UL    ABS. MONOCYTES 0.5 0.0 - 1.0 K/UL    ABS. EOSINOPHILS 0.0 0.0 - 0.4 K/UL    ABS. BASOPHILS 0.0 0.0 - 0.1 K/UL    ABS. IMM.  GRANS. 0.2 (H) 0.00 - 0.04 K/UL    DF AUTOMATED     METABOLIC PANEL, BASIC    Collection Time: 12/23/22  4:39 AM   Result Value Ref Range    Sodium 137 136 - 145 mmol/L    Potassium 5.7 (H) 3.5 - 5.1 mmol/L    Chloride 97 97 - 108 mmol/L    CO2 15 (LL) 21 - 32 mmol/L    Anion gap 25 (H) 5 - 15 mmol/L    Glucose 55 (L) 65 - 100 mg/dL    BUN 17 6 - 20 mg/dL    Creatinine 2.38 (H) 0.55 - 1.02 mg/dL    BUN/Creatinine ratio 7 (L) 12 - 20      eGFR 21 (L) >60 ml/min/1.73m2    Calcium 7.5 (L) 8.5 - 10.1 mg/dL   C REACTIVE PROTEIN, QT    Collection Time: 12/23/22  4:39 AM   Result Value Ref Range    C-Reactive protein 1.20 (H) 0.00 - 0.60 mg/dL   PROCALCITONIN    Collection Time: 12/23/22  4:39 AM   Result Value Ref Range    Procalcitonin 2.07 (H) 0 ng/mL   TSH 3RD GENERATION    Collection Time: 12/23/22  4:39 AM   Result Value Ref Range    TSH 2.05 0.36 - 3.74 uIU/mL   MAGNESIUM    Collection Time: 12/23/22  4:39 AM   Result Value Ref Range    Magnesium 2.2 1.6 - 2.4 mg/dL   PHOSPHORUS    Collection Time: 12/23/22  4:39 AM   Result Value Ref Range    Phosphorus 6.0 (H) 2.6 - 4.7 mg/dL   BLOOD GAS, ARTERIAL    Collection Time: 12/23/22  6:14 AM   Result Value Ref Range    pH 7.23 (LL) 7.35 - 7.45      PCO2 33 (L) 35 - 45 mmHg    PO2 107 (H) 80 - 100 mmHg    O2 SATURATION 97 95 - 99 %    BICARBONATE 14 (L) 22 - 26 mmol/L    BASE DEFICIT 13.0 mmol/L    O2 METHOD VENT      FIO2 35 %    MODE ASSIST CONTROL      Tidal volume 450.0      SET RATE 16 PEEP/CPAP 5.0      Sample source Arterial      SITE Right Femoral      GUMARO'S TEST YES      Carboxy-Hgb 0.4 (L) 1 - 2 %    Methemoglobin 0.4 0 - 1.4 %    Oxyhemoglobin 95.7 95 - 99 %    Performed by Dwain Sen     Critical value read back Called to dr Vannesa Beasley on 12/23/2022 at 06:19     TEMPERATURE 98.6     GLUCOSE, POC    Collection Time: 12/23/22  8:00 AM   Result Value Ref Range    Glucose (POC) 29 (LL) 65 - 100 mg/dL    Performed by Erendira Cassidy (Float)    GLUCOSE, POC    Collection Time: 12/23/22  8:03 AM   Result Value Ref Range    Glucose (POC) 37 (LL) 65 - 100 mg/dL    Performed by Erendira Cassidy (Float)    GLUCOSE, POC    Collection Time: 12/23/22  9:06 AM   Result Value Ref Range    Glucose (POC) 339 (H) 65 - 100 mg/dL    Performed by [id number] [facility] [address], POC    Collection Time: 12/23/22  9:09 AM   Result Value Ref Range    Glucose (POC) 251 (H) 65 - 100 mg/dL    Performed by [employer]      XR CHEST PORT   Final Result   Satisfactory NG tube placement   Mild stable interstitial edema and small left pleural effusion         XR CHEST PORT   Final Result   No significant interval change. XR CHEST PORT   Final Result      Tubes and lines as above. Cardiomegaly with mild pulmonary edema and small left   pleural effusion. US RETROPERITONEUM COMP   Final Result   Nonobstructing right renal stone. Poorly visualized left kidney. No   hydronephrosis is identified. XR CHEST PORT   Final Result      No significant change. Status post right IJ central venous catheter placement   without evidence of pneumothorax. IR INSERT NON TUNL CVC OVER 5 YRS   Final Result   Technically successful ultrasound guided placement of a right internal jugular   vein temporary dialysis catheter and right IJ central venous catheter. A post   procedure chest x-ray is pending. XR CHEST PORT   Final Result      Slightly improved left basilar collapse/consolidation.  Slightly decreased small   to moderate left pleural effusion. Stable small right pleural effusion. CT ABD PELV WO CONT   Final Result   Acute cholecystitis favored. Ultrasound could confirm. Incidental bilateral pleural transudates with associated subsegmental   atelectasis   Incidental nonobstructing right renal calculus and sigmoid diverticulosis   This result was verbally relayed by me at 0403 hours to ULISSES Page   [address] EXT VENOUS BILAT   Final Result      XR CHEST PORT   Final Result      New small left pleural effusion. Emphysema. Recommend PA and lateral chest views   when the patient can better tolerate.          IR INSERT NON TUNL CVC OVER 5 YRS    (Results Pending)   IR US GUIDED VASCULAR ACCESS    (Results Pending)   IR US GUIDED VASCULAR ACCESS    (Results Pending)   IR CHOLECYSTOSTOMY PERCUTANEOUS    (Results Pending)       Time spent in direct and indirect care including coordination of services: Greater than 35 minutes    Signed:  Ena Cisse MD  12/23/2022  5:21 PM

## 2022-12-23 NOTE — PROGRESS NOTES
Abg drawn at time patient had dropped sats from removing avaps mask, fio2 had been increased to 45% but had not had time to recover, post abgs drawn avaps settings adjusted to increase Ve  and opitimize oxygenation. pH 7.14/ paco2 77/ pao2 60/ hco3 26/ bd 3.6/ o2 sat 84%. Pi max increased to 28, Ti increased to 1. 2seconds and fio2 45%, spo2 95% at this time.  Ve 8.8L

## 2022-12-23 NOTE — PROGRESS NOTES
Currently on vent in ICU. Wean as appropriate. Home O2 3L already in place. Consult placed for vascular surgery for IR drain of the gallbladder today TBD.           Jose Elias Russo, MSW

## 2022-12-23 NOTE — PROGRESS NOTES
Surgery critical care Progress Note     Subjective:   Patient examined in ICU. Hospital Course:  Patient examined. Subjective:  Patient has been critically ill since last night. Patient currently intubated. Patient is getting multiple pressors. Patient was attempted ultrahigh placement without success. Review of Systems  Unable to assess   Objective:    Visit Vitals  BP (!) 151/59 Comment: ABP   Pulse 80   Temp 98.7 °F (37.1 °C)   Resp 16   Ht 5' 2\" (1.575 m)   Wt 142 lb 3.2 oz (64.5 kg)   SpO2 (!) 64%   BMI 26.01 kg/m²         Head and neck atraumatic, normocephalic. ENT: No hoarse voice  Cardiac system regular rate rhythm. Pulmonary no audible wheeze  Chest wall excursion normal with respiration cycle  Abdomen is soft not particularly distended. Neurologically unable to assess  Skin is warm and moist.  Psychosocial: Unable to assess  Vascular examination as previously noted no changes.     Current Facility-Administered Medications   Medication Dose Route Frequency Provider Last Rate Last Admin    hydrocortisone Sod Succ (PF) (SOLU-CORTEF) injection 100 mg  100 mg IntraVENous Q6H Isra Bustos MD   100 mg at 12/23/22 0525    0.9% sodium chloride infusion 250 mL  250 mL IntraVENous PRN Isra Bustos MD        bicarbonate dialysis (PRISMASOL) BG K 2/Ca 3.5 5000 ml solution   Extracorporeal DIALYSIS CONTINUOUS Ahmet Lin MD        digoxin (LANOXIN) injection 250 mcg  250 mcg IntraVENous KEEGAN Roberts MD        sodium bicarbonate 8.4 % (1 mEq/mL) injection             piperacillin-tazobactam (ZOSYN) 3.375 g in 0.9% sodium chloride (MBP/ADV) 100 mL MBP  3.375 g IntraVENous Loy Zavala MD        NOREPINephrine (LEVOPHED) 8 mg in 0.9% NS 250ml infusion  0.5-30 mcg/min IntraVENous TITRATE Isra Bustos MD 56.3 mL/hr at 12/23/22 0340 30 mcg/min at 12/23/22 0340    fentaNYL citrate (PF) injection 25 mcg  25 mcg IntraVENous Q1H PRN Isra Bustos MD   25 mcg at 12/22/22 1958    digoxin (LANOXIN) injection 250 mcg  250 mcg IntraVENous DAILY Emerita Roche MD   250 mcg at 12/22/22 1116    albuterol-ipratropium (DUO-NEB) 2.5 MG-0.5 MG/3 ML  3 mL Nebulization Q6H PRN Tiffany Espino MD        [Held by provider] gabapentin (NEURONTIN) capsule 300 mg  300 mg Oral QHS Justin Molina MD        dexmedeTOMidine in 0.9 % NaCl (PRECEDEX) 400 mcg/100 mL (4 mcg/mL) infusion soln  0.1-1.5 mcg/kg/hr IntraVENous TITRATE Tiffany Espino MD 7.2 mL/hr at 12/23/22 0343 0.5 mcg/kg/hr at 12/23/22 0343    DULoxetine (CYMBALTA) capsule 20 mg  20 mg Oral DAILY Liam Molina MD        sodium bicarbonate (8.4%) 150 mEq in dextrose 5% 1,000 mL infusion   IntraVENous CONTINUOUS Tessa Sahu  mL/hr at 12/23/22 0425 New Bag at 12/23/22 0425    albumin human 25% (BUMINATE) solution 25 g  25 g IntraVENous DIALYSIS PRN Tessa Sahu MD        vasopressin (VASOSTRICT) 20 Units in 0.9% sodium chloride 100 mL infusion  0.01-0.03 Units/min IntraVENous TITRATE Herb Castro MD 9 mL/hr at 12/23/22 0054 0.03 Units/min at 12/23/22 0054    PHENYLephrine (REG-SYNEPHRINE) 30 mg in 0.9% sodium chloride 250 mL infusion   mcg/min IntraVENous TITRATE Herb Castro MD 50 mL/hr at 12/23/22 0651 100 mcg/min at 12/23/22 0651    heparin (porcine) 1,000 unit/mL injection 2,500 Units  2,500 Units Hemodialysis DIALYSIS PRN Tessa Sahu MD   2,500 Units at 12/22/22 1833    fentaNYL (PF) 1,500 mcg/30 mL (50 mcg/mL) infusion  0-200 mcg/hr IntraVENous TITRATE Tiffany Espino MD 3 mL/hr at 12/23/22 0343 150 mcg/hr at 12/23/22 0343    rocuronium 10 mg/mL injection             budesonide (PULMICORT) 500 mcg/2 ml nebulizer suspension  500 mcg Nebulization BID RT Tiffany Espino MD        EPINEPHrine (ADRENALIN) 5 mg in 0.9% sodium chloride 250 mL infusion  0-10 mcg/min IntraVENous TITRATE Tiffany Espino MD   Stopped at 12/23/22 0652    dextrose 10% infusion 0-250 mL  0-250 mL IntraVENous PRN Tiffany Espino MD 1,000 mL/hr at 12/23/22 0723 250 mL at 12/23/22 0723    dilTIAZem (CARDIZEM) 125 mg in 0.9% sodium chloride 125 mL ([id number])  0-15 mg/hr IntraVENous TITRATE Summer Taylor MD   Stopped at 12/22/22 0100    [Held by provider] dilTIAZem ER (CARDIZEM CD) capsule 240 mg  240 mg Oral BID Summer Taylor MD   240 mg at 12/21/22 2329    [Held by provider] budesonide-formoteroL (SYMBICORT) 160-4.5 mcg/actuation HFA inhaler 1 Puff  1 Puff Inhalation BID RT Summer Taylor MD   1 Puff at 12/21/22 1806    hydrOXYchloroQUINE (PLAQUENIL) tablet 200 mg  200 mg Oral DAILY WITH BREAKFAST Summer Taylor MD   200 mg at 12/22/22 0848    levothyroxine (SYNTHROID) tablet 75 mcg  75 mcg Oral ACB Summer Taylor MD   75 mcg at 12/22/22 0848    [Held by provider] metoprolol tartrate (LOPRESSOR) tablet 50 mg  50 mg Oral BID Summer Taylor MD   50 mg at 12/21/22 2235    [Held by provider] montelukast (SINGULAIR) tablet 10 mg  10 mg Oral DAILY Cain Molina MD   10 mg at 12/22/22 0848    pantoprazole (PROTONIX) tablet 40 mg  40 mg Oral DAILY Summer Taylor MD   40 mg at 12/22/22 0848    atorvastatin (LIPITOR) tablet 10 mg  10 mg Oral DAILY Summer Taylor MD   10 mg at 12/22/22 0848    tiotropium bromide (SPIRIVA RESPIMAT) 2.5 mcg /actuation  2 Puff Inhalation DAILY Cain Molina MD        sodium chloride (NS) flush 5-40 mL  5-40 mL IntraVENous Q8H Cain Molina MD   10 mL at 12/23/22 0056    sodium chloride (NS) flush 5-40 mL  5-40 mL IntraVENous PRN Summer Taylor MD   10 mL at 12/22/22 1959    acetaminophen (TYLENOL) tablet 650 mg  650 mg Oral Q6H PRN Summer Taylor MD   650 mg at 12/22/22 1416    Or    acetaminophen (TYLENOL) suppository 650 mg  650 mg Rectal Q6H PRN Summer Taylor MD        ondansetron (ZOFRAN ODT) tablet 4 mg  4 mg Oral Q8H PRN Summer Taylor MD   4 mg at 12/22/22 0848    Or    ondansetron (ZOFRAN) injection 4 mg  4 mg IntraVENous Q6H PRN Summer Taylor MD        promethazine (PHENERGAN) 12.5 mg in 0.9% sodium chloride 50 mL IVPB  12.5 mg IntraVENous Q4H PRN Renetta Avina MD        [Held by provider] methylPREDNISolone (PF) (SOLU-MEDROL) injection 40 mg  40 mg IntraVENous Q8H Bijan Yan MD   40 mg at 12/22/22 0561    [Held by provider] furosemide (LASIX) injection 40 mg  40 mg IntraVENous DAILY Howie Chiang MD        [facility] [place] by provider] verapamiL (CALAN) tablet 80 mg  80 mg Oral TID Howie Chiang MD   80 mg at 12/21/22 9648       Data Review  Recent Results (from the past 24 hour(s))   BLOOD GAS, ARTERIAL    Collection Time: 12/22/22 10:10 AM   Result Value Ref Range    pH 7.12 (LL) 7.35 - 7.45      PCO2 67 (H) 35 - 45 mmHg    PO2 67 (L) 80 - 100 mmHg    O2 SATURATION 90 (L) 95 - 99 %    BICARBONATE 22 22 - 26 mmol/L    BASE DEFICIT 8.2 mmol/L    O2 METHOD BiPAP      FIO2 32 %    MODE BiPAP      SET RATE 18      IPAP/PIP 16      PEEP/CPAP 6.0      Sample source Arterial      SITE Right Brachial      GUMARO'S TEST NOT APPLICABLE      Carboxy-Hgb 0.1 (L) 1 - 2 %    Methemoglobin 0.2 0 - 1.4 %    Oxyhemoglobin 89.4 (L) 95 - 99 %    Performed by Oneil Cueto     Critical value read back       Called to Endgame RN on 12/22/2022 at 10:13    TEMPERATURE 96.2     EKG, 12 LEAD, INITIAL    Collection Time: 12/22/22 10:38 AM   Result Value Ref Range    Ventricular Rate 99 BPM    Atrial Rate 78 BPM    QRS Duration 98 ms    Q-T Interval 460 ms    QTC Calculation (Bezet) 590 ms    Calculated R Axis 103 degrees    Calculated T Axis -21 degrees    Diagnosis       Atrial fibrillation  Incomplete right bundle branch block  Possible Lateral infarct , age undetermined  ST & T wave abnormality, consider anterior ischemia  Prolonged QT  Abnormal ECG  When compared with ECG of 21-DEC-2022 14:03, (Unconfirmed)  Vent.  rate has decreased BY  53 BPM  Non-specific change in ST segment in Lateral leads  T wave inversion now evident in Inferior leads  T wave inversion now evident in Anterior leads  Confirmed by Patria Sequeira MD, Joan Salamanca [phone]) on 12/22/2022 1:30:59 PM     LACTIC ACID    Collection Time: 12/22/22 11:53 AM   Result Value Ref Range    Lactic acid 10.4 (HH) 0.4 - 2.0 mmol/L   BLOOD GAS, ARTERIAL    Collection Time: 12/22/22  1:04 PM   Result Value Ref Range    pH 7.20 (LL) 7.35 - 7.45      PCO2 53 (H) 35 - 45 mmHg    PO2 74 (L) 80 - 100 mmHg    O2 SATURATION 93 (L) 95 - 99 %    BICARBONATE 20 (L) 22 - 26 mmol/L    BASE DEFICIT 7.8 mmol/L    O2 METHOD BiPAP      FIO2 28 %    Tidal volume 400.0      SET RATE 18      PEEP/CPAP 6.0      Sample source Arterial      SITE Right Radial      GUMARO'S TEST YES      Carboxy-Hgb 0.2 (L) 1 - 2 %    Methemoglobin 0.2 0 - 1.4 %    Oxyhemoglobin 92.3 (L) 95 - 99 %    Performed by Tracy Wei     Critical value read back Called to Dr. De Wells  on 12/22/2022 at 13:29     TEMPERATURE 80.3     METABOLIC PANEL, BASIC    Collection Time: 12/22/22  2:56 PM   Result Value Ref Range    Sodium 132 (L) 136 - 145 mmol/L    Potassium 7.0 (HH) 3.5 - 5.1 mmol/L    Chloride 94 (L) 97 - 108 mmol/L    CO2 22 21 - 32 mmol/L    Anion gap 16 (H) 5 - 15 mmol/L    Glucose 71 65 - 100 mg/dL    BUN 26 (H) 6 - 20 mg/dL    Creatinine 2.48 (H) 0.55 - 1.02 mg/dL    BUN/Creatinine ratio 10 (L) 12 - 20      eGFR 20 (L) >60 ml/min/1.73m2    Calcium 7.9 (L) 8.5 - 10.1 mg/dL   RENAL FUNCTION PANEL    Collection Time: 12/22/22  2:56 PM   Result Value Ref Range    Sodium 131 (L) 136 - 145 mmol/L    Potassium 7.0 (HH) 3.5 - 5.1 mmol/L    Chloride 94 (L) 97 - 108 mmol/L    CO2 21 21 - 32 mmol/L    Anion gap 16 (H) 5 - 15 mmol/L    Glucose 71 65 - 100 mg/dL    BUN 25 (H) 6 - 20 mg/dL    Creatinine 2.55 (H) 0.55 - 1.02 mg/dL    BUN/Creatinine ratio 10 (L) 12 - 20      eGFR 19 (L) >60 ml/min/1.73m2    Calcium 7.8 (L) 8.5 - 10.1 mg/dL    Phosphorus 8.6 (H) 2.6 - 4.7 mg/dL    Albumin 2.7 (L) 3.5 - 5.0 g/dL   LACTIC ACID    Collection Time: 12/22/22  2:56 PM   Result Value Ref Range    Lactic acid 11.6 (HH) 0.4 - 2.0 mmol/L   PROCALCITONIN Collection Time: 12/22/22  2:56 PM   Result Value Ref Range    Procalcitonin 0.62 (H) 0 ng/mL   CBC WITH AUTOMATED DIFF    Collection Time: 12/22/22  4:12 PM   Result Value Ref Range    WBC 6.4 3.6 - 11.0 K/uL    RBC 2.77 (L) 3.80 - 5.20 M/uL    HGB 8.0 (L) 11.5 - 16.0 g/dL    HCT 28.1 (L) 35.0 - 47.0 %    .4 (H) 80.0 - 99.0 FL    MCH 28.9 26.0 - 34.0 PG    MCHC 28.5 (L) 30.0 - 36.5 g/dL    RDW 14.9 (H) 11.5 - 14.5 %    PLATELET 268 246 - 484 K/uL    MPV 9.4 8.9 - 12.9 FL    NRBC 6.4 (H) 0.0  WBC    ABSOLUTE NRBC 0.41 (H) 0.00 - 0.01 K/uL    NEUTROPHILS 84 (H) 32 - 75 %    BAND NEUTROPHILS 2 0 - 6 %    LYMPHOCYTES 7 (L) 12 - 49 %    MONOCYTES 7 5 - 13 %    EOSINOPHILS 0 0 - 7 %    BASOPHILS 0 0 - 1 %    IMMATURE GRANULOCYTES 0 %    ABS. NEUTROPHILS 5.6 1.8 - 8.0 K/UL    ABS. LYMPHOCYTES 0.4 (L) 0.8 - 3.5 K/UL    ABS. MONOCYTES 0.4 0.0 - 1.0 K/UL    ABS. EOSINOPHILS 0.0 0.0 - 0.4 K/UL    ABS. BASOPHILS 0.0 0.0 - 0.1 K/UL    ABS. IMM.  GRANS. 0.0 K/UL    DF Manual      RBC COMMENTS Anisocytosis  1+        RBC COMMENTS Macrocytosis  1+       D DIMER    Collection Time: 12/22/22  4:12 PM   Result Value Ref Range    D DIMER 15.61 (H) <0.50 ug/ml(FEU)   COVID-19 WITH INFLUENZA A/B    Collection Time: 12/22/22  8:16 PM   Result Value Ref Range    SARS-CoV-2 by PCR Not Detected Not Detected      Influenza A by PCR Not Detected Not Detected      Influenza B by PCR Not Detected Not Detected     GLUCOSE, POC    Collection Time: 12/22/22  9:19 PM   Result Value Ref Range    Glucose (POC) 49 (LL) 65 - 100 mg/dL    Performed by Naga King POC    Collection Time: 12/22/22  9:21 PM   Result Value Ref Range    Glucose (POC) 64 (L) 65 - 100 mg/dL    Performed by Alfreda Pat    RENAL FUNCTION PANEL    Collection Time: 12/22/22  9:29 PM   Result Value Ref Range    Sodium 136 136 - 145 mmol/L    Potassium 5.1 3.5 - 5.1 mmol/L    Chloride 98 97 - 108 mmol/L    CO2 25 21 - 32 mmol/L    Anion gap 13 5 - 15 mmol/L    Glucose 58 (L) 65 - 100 mg/dL    BUN 14 6 - 20 mg/dL    Creatinine 1.75 (H) 0.55 - 1.02 mg/dL    BUN/Creatinine ratio 8 (L) 12 - 20      eGFR 31 (L) >60 ml/min/1.73m2    Calcium 7.4 (L) 8.5 - 10.1 mg/dL    Phosphorus 4.4 2.6 - 4.7 mg/dL    Albumin 2.5 (L) 3.5 - 5.0 g/dL   METABOLIC PANEL, BASIC    Collection Time: 12/22/22  9:29 PM   Result Value Ref Range    Sodium 135 (L) 136 - 145 mmol/L    Potassium 5.0 3.5 - 5.1 mmol/L    Chloride 97 97 - 108 mmol/L    CO2 26 21 - 32 mmol/L    Anion gap 12 5 - 15 mmol/L    Glucose 59 (L) 65 - 100 mg/dL    BUN 14 6 - 20 mg/dL    Creatinine 1.80 (H) 0.55 - 1.02 mg/dL    BUN/Creatinine ratio 8 (L) 12 - 20      eGFR 30 (L) >60 ml/min/1.73m2    Calcium 7.2 (L) 8.5 - 10.1 mg/dL   LACTIC ACID    Collection Time: 12/22/22  9:29 PM   Result Value Ref Range    Lactic acid 7.8 (HH) 0.4 - 2.0 mmol/L   BLOOD GAS, ARTERIAL    Collection Time: 12/22/22 10:01 PM   Result Value Ref Range    pH 7.33 (L) 7.35 - 7.45      PCO2 39 35 - 45 mmHg    PO2 82 80 - 100 mmHg    O2 SATURATION 95 95 - 99 %    BICARBONATE 20 (L) 22 - 26 mmol/L    BASE DEFICIT 5.4 mmol/L    O2 METHOD Non Invasive      FIO2 40 %    MODE BiPAP      Tidal volume 450.0      SET RATE 22      IPAP/PIP 6      High PEEP 28      Sample source Arterial      SITE Right Radial      GUMARO'S TEST YES      Carboxy-Hgb 0.3 (L) 1 - 2 %    Methemoglobin 0.6 0 - 1.4 %    Oxyhemoglobin 94.1 (L) 95 - 99 %    Performed by Warren Barrientos     TEMPERATURE 99.6     BLOOD GAS, ARTERIAL    Collection Time: 12/22/22 10:01 PM   Result Value Ref Range    pH 7.33 (L) 7.35 - 7.45      PCO2 39 35 - 45 mmHg    PO2 82 80 - 100 mmHg    O2 SATURATION 95 95 - 99 %    BICARBONATE 20 (L) 22 - 26 mmol/L    BASE DEFICIT 5.4 mmol/L    O2 METHOD VENT      FIO2 40 %    MODE ASSIST CONTROL      Tidal volume 450.0      SET RATE 16      PEEP/CPAP 5.0      Sample source Arterial      SITE Right Radial      GUMARO'S TEST YES      Carboxy-Hgb 0.3 (L) 1 - 2 % Methemoglobin 0.6 0 - 1.4 %    Oxyhemoglobin 94.1 (L) 95 - 99 %    Performed by Clarence David     TEMPERATURE 99.6     GLUCOSE, POC    Collection Time: 12/22/22 11:25 PM   Result Value Ref Range    Glucose (POC) 155 (H) 65 - 100 mg/dL    Performed by Maciej Rogers    LACTIC ACID    Collection Time: 12/23/22  1:09 AM   Result Value Ref Range    Lactic acid 13.8 (HH) 0.4 - 2.0 mmol/L   CBC WITH AUTOMATED DIFF    Collection Time: 12/23/22  1:09 AM   Result Value Ref Range    WBC 10.9 3.6 - 11.0 K/uL    RBC 2.40 (L) 3.80 - 5.20 M/uL    HGB 7.1 (L) 11.5 - 16.0 g/dL    HCT 23.8 (L) 35.0 - 47.0 %    MCV 99.2 (H) 80.0 - 99.0 FL    MCH 29.6 26.0 - 34.0 PG    MCHC 29.8 (L) 30.0 - 36.5 g/dL    RDW 14.8 (H) 11.5 - 14.5 %    PLATELET 128 903 - 116 K/uL    MPV 10.2 8.9 - 12.9 FL    NRBC 15.1 (H) 0.0  WBC    ABSOLUTE NRBC 1.65 (H) 0.00 - 0.01 K/uL    NEUTROPHILS 80 (H) 32 - 75 %    BAND NEUTROPHILS 3 0 - 6 %    LYMPHOCYTES 4 (L) 12 - 49 %    MONOCYTES 12 5 - 13 %    EOSINOPHILS 1 0 - 7 %    BASOPHILS 0 0 - 1 %    IMMATURE GRANULOCYTES 0 %    ABS. NEUTROPHILS 9.1 (H) 1.8 - 8.0 K/UL    ABS. LYMPHOCYTES 0.4 (L) 0.8 - 3.5 K/UL    ABS. MONOCYTES 1.3 (H) 0.0 - 1.0 K/UL    ABS. EOSINOPHILS 0.1 0.0 - 0.4 K/UL    ABS. BASOPHILS 0.0 0.0 - 0.1 K/UL    ABS. IMM.  GRANS. 0.0 K/UL    DF Manual      RBC COMMENTS Ovalocytes  3+        RBC COMMENTS Macrocytosis  1+        RBC COMMENTS Polychromasia  1+       METABOLIC PANEL, COMPREHENSIVE    Collection Time: 12/23/22  1:09 AM   Result Value Ref Range    Sodium 134 (L) 136 - 145 mmol/L    Potassium 5.4 (H) 3.5 - 5.1 mmol/L    Chloride 99 97 - 108 mmol/L    CO2 18 (L) 21 - 32 mmol/L    Anion gap 17 (H) 5 - 15 mmol/L    Glucose 112 (H) 65 - 100 mg/dL    BUN 16 6 - 20 mg/dL    Creatinine 2.11 (H) 0.55 - 1.02 mg/dL    BUN/Creatinine ratio 8 (L) 12 - 20      eGFR 24 (L) >60 ml/min/1.73m2    Calcium 6.7 (L) 8.5 - 10.1 mg/dL    Bilirubin, total 1.1 (H) 0.2 - 1.0 mg/dL    AST (SGOT) PENDING U/L ALT (SGPT) >3,500 (H) 12 - 78 U/L    Alk.  phosphatase 97 45 - 117 U/L    Protein, total 4.4 (L) 6.4 - 8.2 g/dL    Albumin 2.2 (L) 3.5 - 5.0 g/dL    Globulin 2.2 2.0 - 4.0 g/dL    A-G Ratio 1.0 (L) 1.1 - 2.2     MAGNESIUM    Collection Time: 12/23/22  1:09 AM   Result Value Ref Range    Magnesium 1.9 1.6 - 2.4 mg/dL   TYPE & SCREEN    Collection Time: 12/23/22  2:30 AM   Result Value Ref Range    Crossmatch Expiration 12/26/2022,2359     ABO/Rh(D) A Positive     Antibody screen Negative     Unit number [id number]     Blood component type RC LR     Unit division 00     Status of unit Allocated     TRANSFUSION STATUS Ok to transfuse     Crossmatch result Compatible    LACTIC ACID    Collection Time: 12/23/22  4:39 AM   Result Value Ref Range    Lactic acid 18.9 (HH) 0.4 - 2.0 mmol/L   RENAL FUNCTION PANEL    Collection Time: 12/23/22  4:39 AM   Result Value Ref Range    Sodium 137 136 - 145 mmol/L    Potassium 5.7 (H) 3.5 - 5.1 mmol/L    Chloride 97 97 - 108 mmol/L    CO2 14 (LL) 21 - 32 mmol/L    Anion gap 26 (H) 5 - 15 mmol/L    Glucose 57 (L) 65 - 100 mg/dL    BUN 18 6 - 20 mg/dL    Creatinine 2.30 (H) 0.55 - 1.02 mg/dL    BUN/Creatinine ratio 8 (L) 12 - 20      eGFR 22 (L) >60 ml/min/1.73m2    Calcium 7.5 (L) 8.5 - 10.1 mg/dL    Phosphorus 5.9 (H) 2.6 - 4.7 mg/dL    Albumin 2.5 (L) 3.5 - 5.0 g/dL   CBC WITH AUTOMATED DIFF    Collection Time: 12/23/22  4:39 AM   Result Value Ref Range    WBC 11.1 (H) 3.6 - 11.0 K/uL    RBC 2.42 (L) 3.80 - 5.20 M/uL    HGB 7.0 (L) 11.5 - 16.0 g/dL    HCT 24.2 (L) 35.0 - 47.0 %    .0 (H) 80.0 - 99.0 FL    MCH 28.9 26.0 - 34.0 PG    MCHC 28.9 (L) 30.0 - 36.5 g/dL    RDW 15.0 (H) 11.5 - 14.5 %    PLATELET 325 801 - 450 K/uL    MPV 10.3 8.9 - 12.9 FL    NRBC 22.6 (H) 0.0  WBC    ABSOLUTE NRBC 2.50 (H) 0.00 - 0.01 K/uL    NEUTROPHILS 90 (H) 32 - 75 %    LYMPHOCYTES 3 (L) 12 - 49 %    MONOCYTES 5 5 - 13 %    EOSINOPHILS 0 0 - 7 %    BASOPHILS 0 0 - 1 %    IMMATURE GRANULOCYTES 2 (H) 0 - 0.5 %    ABS. NEUTROPHILS 10.0 (H) 1.8 - 8.0 K/UL    ABS. LYMPHOCYTES 0.4 (L) 0.8 - 3.5 K/UL    ABS. MONOCYTES 0.5 0.0 - 1.0 K/UL    ABS. EOSINOPHILS 0.0 0.0 - 0.4 K/UL    ABS. BASOPHILS 0.0 0.0 - 0.1 K/UL    ABS. IMM.  GRANS. 0.2 (H) 0.00 - 0.04 K/UL    DF AUTOMATED     METABOLIC PANEL, BASIC    Collection Time: 12/23/22  4:39 AM   Result Value Ref Range    Sodium 137 136 - 145 mmol/L    Potassium 5.7 (H) 3.5 - 5.1 mmol/L    Chloride 97 97 - 108 mmol/L    CO2 15 (LL) 21 - 32 mmol/L    Anion gap 25 (H) 5 - 15 mmol/L    Glucose 55 (L) 65 - 100 mg/dL    BUN 17 6 - 20 mg/dL    Creatinine 2.38 (H) 0.55 - 1.02 mg/dL    BUN/Creatinine ratio 7 (L) 12 - 20      eGFR 21 (L) >60 ml/min/1.73m2    Calcium 7.5 (L) 8.5 - 10.1 mg/dL   C REACTIVE PROTEIN, QT    Collection Time: 12/23/22  4:39 AM   Result Value Ref Range    C-Reactive protein 1.20 (H) 0.00 - 0.60 mg/dL   TSH 3RD GENERATION    Collection Time: 12/23/22  4:39 AM   Result Value Ref Range    TSH 2.05 0.36 - 3.74 uIU/mL   MAGNESIUM    Collection Time: 12/23/22  4:39 AM   Result Value Ref Range    Magnesium 2.2 1.6 - 2.4 mg/dL   PHOSPHORUS    Collection Time: 12/23/22  4:39 AM   Result Value Ref Range    Phosphorus 6.0 (H) 2.6 - 4.7 mg/dL   BLOOD GAS, ARTERIAL    Collection Time: 12/23/22  6:14 AM   Result Value Ref Range    pH 7.23 (LL) 7.35 - 7.45      PCO2 33 (L) 35 - 45 mmHg    PO2 107 (H) 80 - 100 mmHg    O2 SATURATION 97 95 - 99 %    BICARBONATE 14 (L) 22 - 26 mmol/L    BASE DEFICIT 13.0 mmol/L    O2 METHOD VENT      FIO2 35 %    MODE ASSIST CONTROL      Tidal volume 450.0      SET RATE 16      PEEP/CPAP 5.0      Sample source Arterial      SITE Right Femoral      GUMARO'S TEST YES      Carboxy-Hgb 0.4 (L) 1 - 2 %    Methemoglobin 0.4 0 - 1.4 %    Oxyhemoglobin 95.7 95 - 99 %    Performed by Rosalie Lema value read back Called to dr Jonel kohler on 12/23/2022 at 06:19     TEMPERATURE 98.6           No images are attached to the encounter. Diagnosis:  Patient Active Problem List   Diagnosis Code    Age-related osteoporosis without current pathological fracture M81.0    Vitamin D deficiency E55.9    GERD without esophagitis K21.9    Chronic obstructive pulmonary disease (HCC) J44.9    Atrial fibrillation with RVR ([facility] [address] I48.91    Atrial fibrillation ([facility]) I48.91        Assessment / Plan:    Profound hypotension:    Patient is currently profoundly hypotensive. I placed arterial line on the right groin. We will adjust pressors. And also patient will need additional transfusion of packed red blood cell. Patient's abdomen is firm. There is a concern about abdominal hypertension. If you can measure bladder pressure will make arrangement. I requested IR to drain the gallbladder today if this is doable. Critical care time spent 30minutes involving direct patient care as well as reviewing patient's labs and coordination of care with nursing staff     Care Plan discussed with: Patient/Family/RN/Case Management           Total time spent with patient: 30 minutes.

## 2022-12-23 NOTE — PROGRESS NOTES
Renal Progress Note    NAME:  Luisa Arciniega   :   1950   MRN:   [id number]     Date/Time:  2022 9:32 AM  Subjective:       -Patient deteriorated over night, required intubation on 4 pressors. Had HD last night and hyperkalemia corrected. Still severe lactic acidosis.  [facility] planned to start this AM.     Review of Systems:  Y  N       Y  N  []  []   Fever/chills                                               []  []   Chest Pain  []  []   Cough                                                       []  []   Diarrhea   []  []   Sputum                                                     []  []   Constipation  []  []   SOB/PEDRAZA                                                []  []   Nausea/Vomit  []  []   Abd Pain                                                    []  []   Tolerating PT  []  []   Dysuria                                                      []  []   Tolerating Diet     [] Unable to obtain  ROS due to  [] mental status change  [] sedated   [] intubated    Medications reviewed:  Current Facility-Administered Medications   Medication Dose Route Frequency    hydrocortisone Sod Succ (PF) (SOLU-CORTEF) injection 100 mg  100 mg IntraVENous Q6H    0.9% sodium chloride infusion 250 mL  250 mL IntraVENous PRN    bicarbonate dialysis (PRISMASOL) BG K 2/Ca 3.5 5000 ml solution   Extracorporeal DIALYSIS CONTINUOUS    digoxin (LANOXIN) injection 250 mcg  250 mcg IntraVENous NOW    sodium bicarbonate 8.4 % (1 mEq/mL) injection        piperacillin-tazobactam (ZOSYN) 3.375 g in 0.9% sodium chloride (MBP/ADV) 100 mL MBP  3.375 g IntraVENous Q8H    NOREPINephrine (LEVOPHED) 8 mg in 0.9% NS 250ml infusion  0.5-30 mcg/min IntraVENous TITRATE    fentaNYL citrate (PF) injection 25 mcg  25 mcg IntraVENous Q1H PRN    digoxin (LANOXIN) injection 250 mcg  250 mcg IntraVENous DAILY    albuterol-ipratropium (DUO-NEB) 2.5 MG-0.5 MG/3 ML  3 mL Nebulization Q6H PRN    [Held by provider] gabapentin (NEURONTIN) capsule 300 mg  300 mg Oral QHS    dexmedeTOMidine in 0.9 % NaCl (PRECEDEX) 400 mcg/100 mL (4 mcg/mL) infusion soln  0.1-1.5 mcg/kg/hr IntraVENous TITRATE    DULoxetine (CYMBALTA) capsule 20 mg  20 mg Oral DAILY    sodium bicarbonate (8.4%) 150 mEq in dextrose 5% 1,000 mL infusion   IntraVENous CONTINUOUS    albumin human 25% (BUMINATE) solution 25 g  25 g IntraVENous DIALYSIS PRN    vasopressin (VASOSTRICT) 20 Units in 0.9% sodium chloride 100 mL infusion  0.01-0.03 Units/min IntraVENous TITRATE    PHENYLephrine (REG-SYNEPHRINE) 30 mg in 0.9% sodium chloride 250 mL infusion   mcg/min IntraVENous TITRATE    heparin (porcine) 1,000 unit/mL injection 2,500 Units  2,500 Units Hemodialysis DIALYSIS PRN    fentaNYL (PF) 1,500 mcg/30 mL (50 mcg/mL) infusion  0-200 mcg/hr IntraVENous TITRATE    budesonide (PULMICORT) 500 mcg/2 ml nebulizer suspension  500 mcg Nebulization BID RT    EPINEPHrine (ADRENALIN) 5 mg in 0.9% sodium chloride 250 mL infusion  0-10 mcg/min IntraVENous TITRATE    dextrose 10% infusion 0-250 mL  0-250 mL IntraVENous PRN    dilTIAZem (CARDIZEM) 125 mg in 0.9% sodium chloride 125 mL ([id number])  0-15 mg/hr IntraVENous TITRATE    [Held by provider] dilTIAZem ER (CARDIZEM CD) capsule 240 mg  240 mg Oral BID    [Held by provider] budesonide-formoteroL (SYMBICORT) 160-4.5 mcg/actuation HFA inhaler 1 Puff  1 Puff Inhalation BID RT    hydrOXYchloroQUINE (PLAQUENIL) tablet 200 mg  200 mg Oral DAILY WITH BREAKFAST    levothyroxine (SYNTHROID) tablet 75 mcg  75 mcg Oral ACB    [Held by provider] metoprolol tartrate (LOPRESSOR) tablet 50 mg  50 mg Oral BID    [Held by provider] montelukast (SINGULAIR) tablet 10 mg  10 mg Oral DAILY    pantoprazole (PROTONIX) tablet 40 mg  40 mg Oral DAILY    atorvastatin (LIPITOR) tablet 10 mg  10 mg Oral DAILY    tiotropium bromide (SPIRIVA RESPIMAT) 2.5 mcg /actuation  2 Puff Inhalation DAILY    sodium chloride (NS) flush 5-40 mL  5-40 mL IntraVENous Q8H    sodium chloride (NS) flush 5-40 mL  5-40 mL IntraVENous PRN    acetaminophen (TYLENOL) tablet 650 mg  650 mg Oral Q6H PRN    Or    acetaminophen (TYLENOL) suppository 650 mg  650 mg Rectal Q6H PRN    ondansetron (ZOFRAN ODT) tablet 4 mg  4 mg Oral Q8H PRN    Or    ondansetron (ZOFRAN) injection 4 mg  4 mg IntraVENous Q6H PRN    promethazine (PHENERGAN) 12.5 mg in 0.9% sodium chloride 50 mL IVPB  12.5 mg IntraVENous Q4H PRN    [Held by provider] methylPREDNISolone (PF) (SOLU-MEDROL) injection 40 mg  40 mg IntraVENous Q8H    [Held by provider] furosemide (LASIX) injection 40 mg  40 mg IntraVENous DAILY    [Held by provider] verapamiL (CALAN) tablet 80 mg  80 mg Oral TID        Objective:   Vitals:  Visit Vitals  /74   Pulse 70   Temp 98.5 °F (36.9 °C)   Resp 16   Ht 5' 2\" (1.575 m)   Wt 64.5 kg (142 lb 3.2 oz)   SpO2 (!) 83%   BMI 26.01 kg/m²     Temp (24hrs), Av.3 °F (36.8 °C), Min:94.8 °F (34.9 °C), Max:100 °F (37.8 °C)    O2 Flow Rate (L/min): 3 l/min O2 Device: Ventilator    Last 24hr Input/Output:    Intake/Output Summary (Last 24 hours) at 2022 0932  Last data filed at 2022 0655  Gross per 24 hour   Intake 6135.89 ml   Output 200 ml   Net 5935.89 ml        Physical Exam:  General: unresponsive, on vent, mottled, BP in 60s on 4 pressors  HEENT: Conjunctiva without pallor ,erythema. The sclerae without icterus. .   Neck:Supple, intubated  Lungs : decreased breathing sounds  CVS: S1 S2 normal, No rub, no LE edema  Abdomen: distended, firm, no bowel sounds present  Extremities: cyanotic, ext mottled.  cold  Skin: No rash   Neurologic: unresponsive, on vent  Psych: ynable to evaluate       [] Telemetry Reviewed     [] NSR [] PAC/PVCs   [] Afib  [] Paced   [] NSVT   [] Victor [] NGT  [] Intubated on vent    Lab Data Reviewed:    Recent Results (from the past 24 hour(s))   BLOOD GAS, ARTERIAL    Collection Time: 22 10:10 AM   Result Value Ref Range    pH 7.12 (LL) 7.35 - 7.45      PCO2 67 (H) 35 - 45 mmHg PO2 67 (L) 80 - 100 mmHg    O2 SATURATION 90 (L) 95 - 99 %    BICARBONATE 22 22 - 26 mmol/L    BASE DEFICIT 8.2 mmol/L    O2 METHOD BiPAP      FIO2 32 %    MODE BiPAP      SET RATE 18      IPAP/PIP 16      PEEP/CPAP 6.0      Sample source Arterial      SITE Right Brachial      GUMARO'S TEST NOT APPLICABLE      Carboxy-Hgb 0.1 (L) 1 - 2 %    Methemoglobin 0.2 0 - 1.4 %    Oxyhemoglobin 89.4 (L) 95 - 99 %    Performed by Grady Shi     Critical value read back       Called to [employer] RN on 12/22/2022 at 10:13    TEMPERATURE 96.2     EKG, 12 LEAD, INITIAL    Collection Time: 12/22/22 10:38 AM   Result Value Ref Range    Ventricular Rate 99 BPM    Atrial Rate 78 BPM    QRS Duration 98 ms    Q-T Interval 460 ms    QTC Calculation (Bezet) 590 ms    Calculated R Axis 103 degrees    Calculated T Axis -21 degrees    Diagnosis       Atrial fibrillation  Incomplete right bundle branch block  Possible Lateral infarct , age undetermined  ST & T wave abnormality, consider anterior ischemia  Prolonged QT  Abnormal ECG  When compared with ECG of 21-DEC-2022 14:03, (Unconfirmed)  Vent.  rate has decreased BY  53 BPM  Non-specific change in ST segment in Lateral leads  T wave inversion now evident in Inferior leads  T wave inversion now evident in Anterior leads  Confirmed by [facility] MD, Soo Lino ([id number]) on 12/22/2022 1:30:59 PM     LACTIC ACID    Collection Time: 12/22/22 11:53 AM   Result Value Ref Range    Lactic acid 10.4 (HH) 0.4 - 2.0 mmol/L   BLOOD GAS, ARTERIAL    Collection Time: 12/22/22  1:04 PM   Result Value Ref Range    pH 7.20 (LL) 7.35 - 7.45      PCO2 53 (H) 35 - 45 mmHg    PO2 74 (L) 80 - 100 mmHg    O2 SATURATION 93 (L) 95 - 99 %    BICARBONATE 20 (L) 22 - 26 mmol/L    BASE DEFICIT 7.8 mmol/L    O2 METHOD BiPAP      FIO2 28 %    Tidal volume 400.0      SET RATE 18      PEEP/CPAP 6.0      Sample source Arterial      SITE Right Radial      GUMARO'S TEST YES      Carboxy-Hgb 0.2 (L) 1 - 2 %    Methemoglobin 0.2 0 - 1.4 %    Oxyhemoglobin 92.3 (L) 95 - 99 %    Performed by Akhil Gooden     Critical value read back Called to Dr. Flor Lin  on 12/22/2022 at 13:29     TEMPERATURE 05.8     METABOLIC PANEL, BASIC    Collection Time: 12/22/22  2:56 PM   Result Value Ref Range    Sodium 132 (L) 136 - 145 mmol/L    Potassium 7.0 (HH) 3.5 - 5.1 mmol/L    Chloride 94 (L) 97 - 108 mmol/L    CO2 22 21 - 32 mmol/L    Anion gap 16 (H) 5 - 15 mmol/L    Glucose 71 65 - 100 mg/dL    BUN 26 (H) 6 - 20 mg/dL    Creatinine 2.48 (H) 0.55 - 1.02 mg/dL    BUN/Creatinine ratio 10 (L) 12 - 20      eGFR 20 (L) >60 ml/min/1.73m2    Calcium 7.9 (L) 8.5 - 10.1 mg/dL   RENAL FUNCTION PANEL    Collection Time: 12/22/22  2:56 PM   Result Value Ref Range    Sodium 131 (L) 136 - 145 mmol/L    Potassium 7.0 (HH) 3.5 - 5.1 mmol/L    Chloride 94 (L) 97 - 108 mmol/L    CO2 21 21 - 32 mmol/L    Anion gap 16 (H) 5 - 15 mmol/L    Glucose 71 65 - 100 mg/dL    BUN 25 (H) 6 - 20 mg/dL    Creatinine 2.55 (H) 0.55 - 1.02 mg/dL    BUN/Creatinine ratio 10 (L) 12 - 20      eGFR 19 (L) >60 ml/min/1.73m2    Calcium 7.8 (L) 8.5 - 10.1 mg/dL    Phosphorus 8.6 (H) 2.6 - 4.7 mg/dL    Albumin 2.7 (L) 3.5 - 5.0 g/dL   LACTIC ACID    Collection Time: 12/22/22  2:56 PM   Result Value Ref Range    Lactic acid 11.6 (HH) 0.4 - 2.0 mmol/L   PROCALCITONIN    Collection Time: 12/22/22  2:56 PM   Result Value Ref Range    Procalcitonin 0.62 (H) 0 ng/mL   CBC WITH AUTOMATED DIFF    Collection Time: 12/22/22  4:12 PM   Result Value Ref Range    WBC 6.4 3.6 - 11.0 K/uL    RBC 2.77 (L) 3.80 - 5.20 M/uL    HGB 8.0 (L) 11.5 - 16.0 g/dL    HCT 28.1 (L) 35.0 - 47.0 %    .4 (H) 80.0 - 99.0 FL    MCH 28.9 26.0 - 34.0 PG    MCHC 28.5 (L) 30.0 - 36.5 g/dL    RDW 14.9 (H) 11.5 - 14.5 %    PLATELET 342 103 - 465 K/uL    MPV 9.4 8.9 - 12.9 FL    NRBC 6.4 (H) 0.0  WBC    ABSOLUTE NRBC 0.41 (H) 0.00 - 0.01 K/uL    NEUTROPHILS 84 (H) 32 - 75 %    BAND NEUTROPHILS 2 0 - 6 %    LYMPHOCYTES 7 (L) 12 - 49 %    MONOCYTES 7 5 - 13 %    EOSINOPHILS 0 0 - 7 %    BASOPHILS 0 0 - 1 %    IMMATURE GRANULOCYTES 0 %    ABS. NEUTROPHILS 5.6 1.8 - 8.0 K/UL    ABS. LYMPHOCYTES 0.4 (L) 0.8 - 3.5 K/UL    ABS. MONOCYTES 0.4 0.0 - 1.0 K/UL    ABS. EOSINOPHILS 0.0 0.0 - 0.4 K/UL    ABS. BASOPHILS 0.0 0.0 - 0.1 K/UL    ABS. IMM.  GRANS. 0.0 K/UL    DF Manual      RBC COMMENTS Anisocytosis  1+        RBC COMMENTS Macrocytosis  1+       D DIMER    Collection Time: 12/22/22  4:12 PM   Result Value Ref Range    D DIMER 15.61 (H) <0.50 ug/ml(FEU)   COVID-19 WITH INFLUENZA A/B    Collection Time: 12/22/22  8:16 PM   Result Value Ref Range    SARS-CoV-2 by PCR Not Detected Not Detected      Influenza A by PCR Not Detected Not Detected      Influenza B by PCR Not Detected Not Detected     GLUCOSE, POC    Collection Time: 12/22/22  9:19 PM   Result Value Ref Range    Glucose (POC) 49 (LL) 65 - 100 mg/dL    Performed by [employer]    GLUCOSE, POC    Collection Time: 12/22/22  9:21 PM   Result Value Ref Range    Glucose (POC) 64 (L) 65 - 100 mg/dL    Performed by [employer]    RENAL FUNCTION PANEL    Collection Time: 12/22/22  9:29 PM   Result Value Ref Range    Sodium 136 136 - 145 mmol/L    Potassium 5.1 3.5 - 5.1 mmol/L    Chloride 98 97 - 108 mmol/L    CO2 25 21 - 32 mmol/L    Anion gap 13 5 - 15 mmol/L    Glucose 58 (L) 65 - 100 mg/dL    BUN 14 6 - 20 mg/dL    Creatinine 1.75 (H) 0.55 - 1.02 mg/dL    BUN/Creatinine ratio 8 (L) 12 - 20      eGFR 31 (L) >60 ml/min/1.73m2    Calcium 7.4 (L) 8.5 - 10.1 mg/dL    Phosphorus 4.4 2.6 - 4.7 mg/dL    Albumin 2.5 (L) 3.5 - 5.0 g/dL   METABOLIC PANEL, BASIC    Collection Time: 12/22/22  9:29 PM   Result Value Ref Range    Sodium 135 (L) 136 - 145 mmol/L    Potassium 5.0 3.5 - 5.1 mmol/L    Chloride 97 97 - 108 mmol/L    CO2 26 21 - 32 mmol/L    Anion gap 12 5 - 15 mmol/L    Glucose 59 (L) 65 - 100 mg/dL    BUN 14 6 - 20 mg/dL    Creatinine 1.80 (H) 0.55 - 1.02 mg/dL    BUN/Creatinine ratio 8 (L) 12 - 20      eGFR 30 (L) >60 ml/min/1.73m2    Calcium 7.2 (L) 8.5 - 10.1 mg/dL   LACTIC ACID    Collection Time: 12/22/22  9:29 PM   Result Value Ref Range    Lactic acid 7.8 (HH) 0.4 - 2.0 mmol/L   BLOOD GAS, ARTERIAL    Collection Time: 12/22/22 10:01 PM   Result Value Ref Range    pH 7.33 (L) 7.35 - 7.45      PCO2 39 35 - 45 mmHg    PO2 82 80 - 100 mmHg    O2 SATURATION 95 95 - 99 %    BICARBONATE 20 (L) 22 - 26 mmol/L    BASE DEFICIT 5.4 mmol/L    O2 METHOD Non Invasive      FIO2 40 %    MODE BiPAP      Tidal volume 450.0      SET RATE 22      IPAP/PIP 6      High PEEP 28      Sample source Arterial      SITE Right Radial      GUMARO'S TEST YES      Carboxy-Hgb 0.3 (L) 1 - 2 %    Methemoglobin 0.6 0 - 1.4 %    Oxyhemoglobin 94.1 (L) 95 - 99 %    Per[facility] by [facility]     TEMPERATURE 99.6     BLOOD GAS, ARTERIAL    Collection Time: 12/22/22 10:01 PM   Result Value Ref Range    pH 7.33 (L) 7.35 - 7.45      PCO2 39 35 - 45 mmHg    PO2 82 80 - 100 mmHg    O2 SATURATION 95 95 - 99 %    BICARBONATE 20 (L) 22 - 26 mmol/L    BASE DEFICIT 5.4 mmol/L    O2 METHOD VENT      FIO2 40 %    MODE ASSIST CONTROL      Tidal volume 450.0      SET RATE 16      PEEP/CPAP 5.0      Sample source Arterial      SITE Right Radial      GUMARO'S TEST YES      Carboxy-Hgb 0.3 (L) 1 - 2 %    Methemoglobin 0.6 0 - 1.4 %    Oxyhemoglobin 94.1 (L) 95 - 99 %    Performed by [facility]     TEMPERATURE 99.6     GLUCOSE, POC    Collection Time: 12/22/22 11:25 PM   Result Value Ref Range    Glucose (POC) 155 (H) 65 - 100 mg/dL    Performed by Claire Lewis    LACTIC ACID    Collection Time: 12/23/22  1:09 AM   Result Value Ref Range    Lactic acid 13.8 (HH) 0.4 - 2.0 mmol/L   CBC WITH AUTOMATED DIFF    Collection Time: 12/23/22  1:09 AM   Result Value Ref Range    WBC 10.9 3.6 - 11.0 K/uL    RBC 2.40 (L) 3.80 - 5.20 M/uL    HGB 7.1 (L) 11.5 - 16.0 g/dL    HCT 23.8 (L) 35.0 - 47.0 %    MCV 99.2 (H) 80.0 - 99.0 FL    MCH 29.6 26.0 - 34.0 PG    MCHC 29.8 (L) 30.0 - 36.5 g/dL    RDW 14.8 (H) 11.5 - 14.5 %    PLATELET 102 334 - 936 K/uL    MPV 10.2 8.9 - 12.9 FL    NRBC 15.1 (H) 0.0  WBC    ABSOLUTE NRBC 1.65 (H) 0.00 - 0.01 K/uL    NEUTROPHILS 80 (H) 32 - 75 %    BAND NEUTROPHILS 3 0 - 6 %    LYMPHOCYTES 4 (L) 12 - 49 %    MONOCYTES 12 5 - 13 %    EOSINOPHILS 1 0 - 7 %    BASOPHILS 0 0 - 1 %    IMMATURE GRANULOCYTES 0 %    ABS. NEUTROPHILS 9.1 (H) 1.8 - 8.0 K/UL    ABS. LYMPHOCYTES 0.4 (L) 0.8 - 3.5 K/UL    ABS. MONOCYTES 1.3 (H) 0.0 - 1.0 K/UL    ABS. EOSINOPHILS 0.1 0.0 - 0.4 K/UL    ABS. BASOPHILS 0.0 0.0 - 0.1 K/UL    ABS. IMM. GRANS. 0.0 K/UL    DF Manual      RBC COMMENTS Ovalocytes  3+        RBC COMMENTS Macrocytosis  1+        RBC COMMENTS Polychromasia  1+       METABOLIC PANEL, COMPREHENSIVE    Collection Time: 12/23/22  1:09 AM   Result Value Ref Range    Sodium 134 (L) 136 - 145 mmol/L    Potassium 5.4 (H) 3.5 - 5.1 mmol/L    Chloride 99 97 - 108 mmol/L    CO2 18 (L) 21 - 32 mmol/L    Anion gap 17 (H) 5 - 15 mmol/L    Glucose 112 (H) 65 - 100 mg/dL    BUN 16 6 - 20 mg/dL    Creatinine 2.11 (H) 0.55 - 1.02 mg/dL    BUN/Creatinine ratio 8 (L) 12 - 20      eGFR 24 (L) >60 ml/min/1.73m2    Calcium 6.7 (L) 8.5 - 10.1 mg/dL    Bilirubin, total 1.1 (H) 0.2 - 1.0 mg/dL    AST (SGOT) PENDING U/L    ALT (SGPT) >3,500 (H) 12 - 78 U/L    Alk.  phosphatase 97 45 - 117 U/L    Protein, total 4.4 (L) 6.4 - 8.2 g/dL    Albumin 2.2 (L) 3.5 - 5.0 g/dL    Globulin 2.2 2.0 - 4.0 g/dL    A-G Ratio 1.0 (L) 1.1 - 2.2     MAGNESIUM    Collection Time: 12/23/22  1:09 AM   Result Value Ref Range    Magnesium 1.9 1.6 - 2.4 mg/dL   TYPE & SCREEN    Collection Time: 12/23/22  2:30 AM   Result Value Ref Range    Crossmatch Expiration 12/26/2022,2359     ABO/Rh(D) A Positive     Antibody screen Negative     Unit number [id number]     Blood component type RC LR     Unit division 00     Status of unit Melanie to transfuse Crossmatch result Compatible    LACTIC ACID    Collection Time: 12/23/22  4:39 AM   Result Value Ref Range    Lactic acid 18.9 (HH) 0.4 - 2.0 mmol/L   RENAL FUNCTION PANEL    Collection Time: 12/23/22  4:39 AM   Result Value Ref Range    Sodium 137 136 - 145 mmol/L    Potassium 5.7 (H) 3.5 - 5.1 mmol/L    Chloride 97 97 - 108 mmol/L    CO2 14 (LL) 21 - 32 mmol/L    Anion gap 26 (H) 5 - 15 mmol/L    Glucose 57 (L) 65 - 100 mg/dL    BUN 18 6 - 20 mg/dL    Creatinine 2.30 (H) 0.55 - 1.02 mg/dL    BUN/Creatinine ratio 8 (L) 12 - 20      eGFR 22 (L) >60 ml/min/1.73m2    Calcium 7.5 (L) 8.5 - 10.1 mg/dL    Phosphorus 5.9 (H) 2.6 - 4.7 mg/dL    Albumin 2.5 (L) 3.5 - 5.0 g/dL   CBC WITH AUTOMATED DIFF    Collection Time: 12/23/22  4:39 AM   Result Value Ref Range    WBC 11.1 (H) 3.6 - 11.0 K/uL    RBC 2.42 (L) 3.80 - 5.20 M/uL    HGB 7.0 (L) 11.5 - 16.0 g/dL    HCT 24.2 (L) 35.0 - 47.0 %    .0 (H) 80.0 - 99.0 FL    MCH 28.9 26.0 - 34.0 PG    MCHC 28.9 (L) 30.0 - 36.5 g/dL    RDW 15.0 (H) 11.5 - 14.5 %    PLATELET 984 121 - 044 K/uL    MPV 10.3 8.9 - 12.9 FL    NRBC 22.6 (H) 0.0  WBC    ABSOLUTE NRBC 2.50 (H) 0.00 - 0.01 K/uL    NEUTROPHILS 90 (H) 32 - 75 %    LYMPHOCYTES 3 (L) 12 - 49 %    MONOCYTES 5 5 - 13 %    EOSINOPHILS 0 0 - 7 %    BASOPHILS 0 0 - 1 %    IMMATURE GRANULOCYTES 2 (H) 0 - 0.5 %    ABS. NEUTROPHILS 10.0 (H) 1.8 - 8.0 K/UL    ABS. LYMPHOCYTES 0.4 (L) 0.8 - 3.5 K/UL    ABS. MONOCYTES 0.5 0.0 - 1.0 K/UL    ABS. EOSINOPHILS 0.0 0.0 - 0.4 K/UL    ABS. BASOPHILS 0.0 0.0 - 0.1 K/UL    ABS. IMM.  GRANS. 0.2 (H) 0.00 - 0.04 K/UL    DF AUTOMATED     METABOLIC PANEL, BASIC    Collection Time: 12/23/22  4:39 AM   Result Value Ref Range    Sodium 137 136 - 145 mmol/L    Potassium 5.7 (H) 3.5 - 5.1 mmol/L    Chloride 97 97 - 108 mmol/L    CO2 15 (LL) 21 - 32 mmol/L    Anion gap 25 (H) 5 - 15 mmol/L    Glucose 55 (L) 65 - 100 mg/dL    BUN 17 6 - 20 mg/dL    Creatinine 2.38 (H) 0.55 - 1.02 mg/dL BUN/Creatinine ratio 7 (L) 12 - 20      eGFR 21 (L) >60 ml/min/1.73m2    Calcium 7.5 (L) 8.5 - 10.1 mg/dL   C REACTIVE PROTEIN, QT    Collection Time: 12/23/22  4:39 AM   Result Value Ref Range    C-Reactive protein 1.20 (H) 0.00 - 0.60 mg/dL   TSH 3RD GENERATION    Collection Time: 12/23/22  4:39 AM   Result Value Ref Range    TSH 2.05 0.36 - 3.74 uIU/mL   MAGNESIUM    Collection Time: 12/23/22  4:39 AM   Result Value Ref Range    Magnesium 2.2 1.6 - 2.4 mg/dL   PHOSPHORUS    Collection Time: 12/23/22  4:39 AM   Result Value Ref Range    Phosphorus 6.0 (H) 2.6 - 4.7 mg/dL   BLOOD GAS, ARTERIAL    Collection Time: 12/23/22  6:14 AM   Result Value Ref Range    pH 7.23 (LL) 7.35 - 7.45      PCO2 33 (L) 35 - 45 mmHg    PO2 107 (H) 80 - 100 mmHg    O2 SATURATION 97 95 - 99 %    BICARBONATE 14 (L) 22 - 26 mmol/L    BASE DEFICIT 13.0 mmol/L    O2 METHOD VENT      FIO2 35 %    MODE ASSIST CONTROL      Tidal volume 450.0      SET RATE 16      PEEP/CPAP 5.0      Sample source Arterial      SITE Right Femoral      GUMARO'S TEST YES      Carboxy-Hgb 0.4 (L) 1 - 2 %    Methemoglobin 0.4 0 - 1.4 %    Oxyhemoglobin 95.7 95 - 99 %    Performed by Lena Ahumada     Critical value read back Called to dr Heath kohler on 12/23/2022 at 06:19     TEMPERATURE 98.6     GLUCOSE, POC    Collection Time: 12/23/22  8:00 AM   Result Value Ref Range    Glucose (POC) 29 (LL) 65 - 100 mg/dL    Performed by [place] Lennon (Float)    GLUCOSE, POC    Collection Time: 12/23/22  8:03 AM   Result Value Ref Range    Glucose (POC) 37 (LL) 65 - 100 mg/dL    Performed by [place] Lennon (Float)    GLUCOSE, POC    Collection Time: 12/23/22  9:06 AM   Result Value Ref Range    Glucose (POC) 339 (H) 65 - 100 mg/dL    Performed by [address] [place] [address] St, POC    Collection Time: 12/23/22  9:09 AM   Result Value Ref Range    Glucose (POC) 251 (H) 65 - 100 mg/dL    Performed by Catrachito Martel          Assessment/Plan:   Active Problems:    Atrial fibrillation with RVR ([facility] [place] [address] (11/22/2022)         DESIRAE due to renal hypoperfusion insetting of AFIB/RVR and hypotension and possible sepsis-other Ddx includes renal artery embolism which is less likely  -s/p HD last night for hyperkalemia, start CVVH tghis am, poor prognosis on 4 pressors. Daughter at bedside     Hyperkalemia,   -s/p kayexalate, IV glucose and insulin  -s/p HD     Septic shock?  Possible acute cholecystitis  -on 4 pressors, ABx     Acute on chronic hypoxic and hypercapnic resp failure-on BIPAP  Severe COPD  -intubated     Anemia      Poor prognosis, discussed with Dr Michelle Marr and ICU team    Discussed with Daughter Polly Khalil at bedside    Total time spent with patient:  [] 15   [] 25   [] 35   []  __ minutes    [] Critical Care Provided    Care Plan discussed with:    [] Patient   [] Family    [] Care Manager   [] Consultant/Specialist :      []   >50% of visit spent in counseling and coordination of care   (Discussed [] CODE status,  [] Care Plan, [] D/C Planning)    ___________________________________________________    Attending Physician: MD Da Colon

## 2022-12-23 NOTE — DIALYSIS
Patient received 2.5 hours of hemodialysis. No fluid was removed. Patient was alert during treatment. Patient's bp was soft during treatment and was placed on 2 additional pressors. 35.2 liters of blood was processed. Report was given to primary [facility].

## 2022-12-23 NOTE — ANESTHESIA PROCEDURE NOTES
Emergent Intubation  Performed by: Greg Izaguirre CRNA  Authorized by: Greg Izaguirre CRNA     Emergent Intubation:   Location:  ICU  Date/Time:  12/22/2022 9:05 PM  Indications:  Respiratory failure  Spontaneous Ventilation: present    Level of Consciousness: awake  Preoxygenated: Yes      Airway Documentation:   Airway:  ETT - Cuffed  Technique:  Intubating stylet  Advanced Technique:  Glide scope  Insertion Site:  Oral  Blade Type:  Victor Manuel  Blade Size:  3  ETT size (mm):  8.5  ETT Line José:  Lips  ETT Insertion depth (cm):  22  Placement verified by: auscultation and EtCO2    Attempts:  1  Difficult airway: No    Anesthesia called for urgent/emergent intubation. Patient was identified using two patient identification methods. 100% oxygen via Ambu bag, wall suction, emergency medications, and intubating supplies were readily available. Patient pre-oxygenated with 100% oxygen via Ambu bag. Rapid sequence induction was performed using anesthesia induction agents and muscle relaxants as needed. Patient was intubated with endotracheal tube. End tidal carbon dioxide was confirmed via colorimetry, capnography, bilateral breath sounds, and chest x-ray as needed per the primary team. Patient's vital signs were as expected after induction and/or endotracheal intubation. Sedation recommendations were given to the primary team. The patient tolerated the procedure well and no complications were observed.

## 2022-12-23 NOTE — PROGRESS NOTES
Dr. Hall Begin on for Dr. Desiree Peterson per ; called for Cardiology and left voicemail. -130's on tele monitor. BP  systolic, client remains on pressors. HD finished at approximately 1935 per [place], HD RN. Plan to redraw labs at 2100 after HD. Awaiting call back from Cardiology.

## 2022-12-23 NOTE — PROGRESS NOTES
Unable to chart on lines, tubes, and drains from before midnight on [facility]. Lines unchanged from previous assessments. Shift assessment completed at 1930 and see notes for details of assessment changes and procedures.

## 2022-12-23 NOTE — PROGRESS NOTES
Dr. Greg Yee text-paged via [employer] ABG results and ionized calcium results - see RT progress notes, results not crossing over at this time per RT. Addendum 12/23/22 0137: From Dr. Grge Yee telephone order with readback received for 2 amps of sodium bicarbonate and total of 4 gms of calcium gluconate. Clarification requested for administration time for calcium gluconate, awaiting call back. Addendum 12/23/22 0138: Dr. Paula Ortega notified client's abdomen has become firm all over, client restless and agitated in bed, increased sedation and Fentanyl gtts to manage. Discussed and orders verified for IR/Angio to come and place drain 12/23/22. Dr. Paula Ortega discussed that client is not a surgical candidate at this time. Addendum 12/23/22 0144: Lab called and 8 minutes remaining on labs sent at [address] [place] [address]. Addendum 12/23/22 0159: 2 amps of sodium bicarbonate given by Brooklynn Covarrubias ICU RN. Clarified with Dr. Greg Yee and calcium gluconate can be given over 2 hours total. Pharmacist, Caitlin Chan, notified and compounding. Addendum 12/23/22 0207: received critical lactic acid 13.8 called from laboratory; notified Dr. Greg Yee by [address] [place] [address]; awaiting call back or orders. Awaiting other chemistry and hematology results. Addendum 12/23/22 0215: telephone consent obtained from client's daughter, Augusto Warren, for arterial line placement. CRNA, [address] [place] [address] , notified. Addendum 12/23/22 0218: Dr. Shaji Hayward paged via answering service per [facility]  for lab updates, vital sign/BP updates, and increasing 4 vasopressors. No urine output. Renal ultrasound confirmed Victor in bladder, but no urine in bladder at this time. Victor irrigated per sterile process per hospital policy, but no output at this time. Discussed with Dr. Shaji Hayward and entering orders for CVVH. Addendum 12/23/22 0230: received call back from. Dr. Greg Yee, discussed orders from Dr. Shaji Hayward and discussion with Dr. Paula Ortega.  He stated he would review, enter orders for stress-dose steroids, albumin, PRBC's, and would enter additional labs. Addendum 12/23/22 0300: Val Quintero CRNA, attempted to place arterial line, but unsuccessful at this time. He stated he would pass on to day shift to re-attempt. Addendum 12/23/22 0224: daughter, Jasmin Ratliff, called at [phone] and telephone consent obtained per policy and placed in chart for blood products and CRRT dialysis. PRBC transfusion pending availability from blood bank, type and screen previously sent and discussed with laboratory to utilize for type and crossmatch as ordered for 1 PRBC.

## 2022-12-23 NOTE — PROGRESS NOTES
I was initially paged that Ms. Gage was requiring more pressors. Now on 4 pressors, maxed on levophed, clint and vasopressin. -STAT CBC, CMP, lactic acid  -STAT ABG    --------------------------------------------------------  ABG shows metabolic acidosis with pH of 7.23 and ionized calcium of 0.81.  -2 amps IV sodium bicarb  -[id number] calcium gluconate    ------------------------------------------------------    Blood pressure improving. I spoke with the nurse to obtain more history and reviewed CBC-Hgb 7.1 and CMP- albumin 1.7 and shock liver.   Of note, patient was on solumedrol previously.  -100mg solucortef s6irafe  -50g IV albumin from 25% bottle  -1 unit PRBCs  -add on TSH and T4

## 2022-12-23 NOTE — PROGRESS NOTES
I was called late last evening. The patient was not doing well. She was intubated. Blood gases reviewed and ventilator was adjusted. Orders were given for fluid boluses and to start the patient on epinephrine infusion if needed. She has already been on 3 pressors. This morning I was in the room. Daughter at the bedside. The patient is DNR. On 3 pressors, Matthew-Synephrine at 100, vasopressin 0.03 and Levophed at 30 mics. Off epi infusion. Also on Precedex 1.5 and fentanyl at 125 mics. Rhythm atrial fibrillation. Lactic acid more than 18. She has mottling of the skin. There has been no urine output. Ventilator settings reviewed. Tidal volume 450 with an O2 of 30% rate of 14 and PEEP of 5. Peak pressures high. The ventilator was adjusted to pressure control and rate was increased to 18. However the patient developed asystole. Lost pulses. Nursing staff at the bedside. Daughter at the bedside.   The patient was pronounced at 9:33 AM.

## 2022-12-23 NOTE — PROGRESS NOTES
Received call from lab that client's blood bank sample will have to be re-run and may have to go to [facility] for analysis.

## 2022-12-25 LAB
BACTERIA SPEC CULT: NORMAL
SPECIAL REQUESTS,SREQ: NORMAL

## 2022-12-27 LAB
ABO + RH BLD: NORMAL
BACTERIA SPEC CULT: NORMAL
BLD PROD TYP BPU: NORMAL
BLOOD GROUP ANTIBODIES SERPL: NEGATIVE
BPU ID: NORMAL
CROSSMATCH RESULT,%XM: NORMAL
SPECIAL REQUESTS,SREQ: NORMAL
SPECIMEN EXP DATE BLD: NORMAL
STATUS OF UNIT,%ST: NORMAL
TRANSFUSION STATUS PATIENT QL: NORMAL
UNIT DIVISION, %UDIV: 0